# Patient Record
Sex: FEMALE | Race: WHITE | NOT HISPANIC OR LATINO | Employment: FULL TIME | URBAN - METROPOLITAN AREA
[De-identification: names, ages, dates, MRNs, and addresses within clinical notes are randomized per-mention and may not be internally consistent; named-entity substitution may affect disease eponyms.]

---

## 2017-02-21 ENCOUNTER — ALLSCRIPTS OFFICE VISIT (OUTPATIENT)
Dept: OTHER | Facility: OTHER | Age: 57
End: 2017-02-21

## 2017-02-21 DIAGNOSIS — M25.511 PAIN IN RIGHT SHOULDER: ICD-10-CM

## 2017-02-21 DIAGNOSIS — M79.89 OTHER SPECIFIED SOFT TISSUE DISORDERS: ICD-10-CM

## 2017-02-21 DIAGNOSIS — Z13.220 ENCOUNTER FOR SCREENING FOR LIPOID DISORDERS: ICD-10-CM

## 2017-02-21 DIAGNOSIS — Z12.39 ENCOUNTER FOR OTHER SCREENING FOR MALIGNANT NEOPLASM OF BREAST: ICD-10-CM

## 2017-03-06 ENCOUNTER — HOSPITAL ENCOUNTER (OUTPATIENT)
Dept: ULTRASOUND IMAGING | Facility: CLINIC | Age: 57
Discharge: HOME | End: 2017-03-06
Payer: COMMERCIAL

## 2017-03-06 ENCOUNTER — ALLSCRIPTS OFFICE VISIT (OUTPATIENT)
Dept: OTHER | Facility: OTHER | Age: 57
End: 2017-03-06

## 2017-03-06 DIAGNOSIS — M79.89 OTHER SPECIFIED SOFT TISSUE DISORDERS: ICD-10-CM

## 2017-03-06 PROCEDURE — 93971 EXTREMITY STUDY: CPT

## 2017-03-10 ENCOUNTER — ALLSCRIPTS OFFICE VISIT (OUTPATIENT)
Dept: OTHER | Facility: OTHER | Age: 57
End: 2017-03-10

## 2017-03-17 ENCOUNTER — GENERIC CONVERSION - ENCOUNTER (OUTPATIENT)
Dept: OTHER | Facility: OTHER | Age: 57
End: 2017-03-17

## 2017-03-28 ENCOUNTER — ALLSCRIPTS OFFICE VISIT (OUTPATIENT)
Dept: OTHER | Facility: OTHER | Age: 57
End: 2017-03-28

## 2017-05-02 ENCOUNTER — ALLSCRIPTS OFFICE VISIT (OUTPATIENT)
Dept: OTHER | Facility: OTHER | Age: 57
End: 2017-05-02

## 2017-06-07 ENCOUNTER — APPOINTMENT (OUTPATIENT)
Dept: LAB | Facility: CLINIC | Age: 57
End: 2017-06-07
Payer: COMMERCIAL

## 2017-06-07 ENCOUNTER — GENERIC CONVERSION - ENCOUNTER (OUTPATIENT)
Dept: OTHER | Facility: OTHER | Age: 57
End: 2017-06-07

## 2017-06-07 DIAGNOSIS — Z13.220 ENCOUNTER FOR SCREENING FOR LIPOID DISORDERS: ICD-10-CM

## 2017-06-07 LAB
ALBUMIN SERPL BCP-MCNC: 3.6 G/DL (ref 3.5–5)
ALP SERPL-CCNC: 73 U/L (ref 46–116)
ALT SERPL W P-5'-P-CCNC: 17 U/L (ref 12–78)
ANION GAP SERPL CALCULATED.3IONS-SCNC: 7 MMOL/L (ref 4–13)
AST SERPL W P-5'-P-CCNC: 14 U/L (ref 5–45)
BILIRUB SERPL-MCNC: 0.32 MG/DL (ref 0.2–1)
BILIRUB UR QL STRIP: NEGATIVE
BUN SERPL-MCNC: 18 MG/DL (ref 5–25)
CALCIUM SERPL-MCNC: 9 MG/DL (ref 8.3–10.1)
CHLORIDE SERPL-SCNC: 105 MMOL/L (ref 100–108)
CHOLEST SERPL-MCNC: 211 MG/DL (ref 50–200)
CLARITY UR: CLEAR
CO2 SERPL-SCNC: 27 MMOL/L (ref 21–32)
COLOR UR: YELLOW
CREAT SERPL-MCNC: 0.92 MG/DL (ref 0.6–1.3)
GFR SERPL CREATININE-BSD FRML MDRD: >60 ML/MIN/1.73SQ M
GLUCOSE P FAST SERPL-MCNC: 94 MG/DL (ref 65–99)
GLUCOSE UR STRIP-MCNC: NEGATIVE MG/DL
HDLC SERPL-MCNC: 74 MG/DL (ref 40–60)
HGB UR QL STRIP.AUTO: NEGATIVE
KETONES UR STRIP-MCNC: NEGATIVE MG/DL
LDLC SERPL CALC-MCNC: 122 MG/DL (ref 0–100)
LEUKOCYTE ESTERASE UR QL STRIP: NEGATIVE
NITRITE UR QL STRIP: NEGATIVE
PH UR STRIP.AUTO: 6 [PH] (ref 4.5–8)
POTASSIUM SERPL-SCNC: 4.3 MMOL/L (ref 3.5–5.3)
PROT SERPL-MCNC: 7.2 G/DL (ref 6.4–8.2)
PROT UR STRIP-MCNC: NEGATIVE MG/DL
SODIUM SERPL-SCNC: 139 MMOL/L (ref 136–145)
SP GR UR STRIP.AUTO: 1.02 (ref 1–1.03)
TRIGL SERPL-MCNC: 73 MG/DL
UROBILINOGEN UR QL STRIP.AUTO: 0.2 E.U./DL

## 2017-06-07 PROCEDURE — 80061 LIPID PANEL: CPT

## 2017-06-07 PROCEDURE — 80053 COMPREHEN METABOLIC PANEL: CPT

## 2017-06-07 PROCEDURE — 36415 COLL VENOUS BLD VENIPUNCTURE: CPT

## 2017-06-07 PROCEDURE — 81003 URINALYSIS AUTO W/O SCOPE: CPT

## 2017-08-25 ENCOUNTER — GENERIC CONVERSION - ENCOUNTER (OUTPATIENT)
Dept: OTHER | Facility: OTHER | Age: 57
End: 2017-08-25

## 2018-01-12 VITALS
RESPIRATION RATE: 12 BRPM | WEIGHT: 146 LBS | DIASTOLIC BLOOD PRESSURE: 70 MMHG | HEIGHT: 63 IN | BODY MASS INDEX: 25.87 KG/M2 | HEART RATE: 82 BPM | OXYGEN SATURATION: 99 % | SYSTOLIC BLOOD PRESSURE: 130 MMHG

## 2018-01-12 NOTE — MISCELLANEOUS
Message  Return to work or school:   Laila Neil is under my professional care  She was seen in my office on 3/10/2017   She is able to return to work on  03/20/2017       [facility]  Signatures   Electronically signed by :  MELECIO Durbin; Mar 17 2017  1:17PM EST                       (Author)

## 2018-01-13 VITALS
BODY MASS INDEX: 26.31 KG/M2 | HEART RATE: 73 BPM | RESPIRATION RATE: 12 BRPM | DIASTOLIC BLOOD PRESSURE: 76 MMHG | SYSTOLIC BLOOD PRESSURE: 120 MMHG | OXYGEN SATURATION: 97 % | WEIGHT: 148.5 LBS | TEMPERATURE: 97.8 F | HEIGHT: 63 IN

## 2018-01-13 VITALS
HEIGHT: 63 IN | HEART RATE: 73 BPM | OXYGEN SATURATION: 99 % | DIASTOLIC BLOOD PRESSURE: 72 MMHG | SYSTOLIC BLOOD PRESSURE: 122 MMHG | TEMPERATURE: 97.8 F | BODY MASS INDEX: 25.52 KG/M2 | RESPIRATION RATE: 12 BRPM | WEIGHT: 144.04 LBS

## 2018-01-13 VITALS
HEART RATE: 70 BPM | WEIGHT: 146 LBS | SYSTOLIC BLOOD PRESSURE: 130 MMHG | HEIGHT: 63 IN | DIASTOLIC BLOOD PRESSURE: 72 MMHG | BODY MASS INDEX: 25.87 KG/M2 | RESPIRATION RATE: 14 BRPM

## 2018-01-13 NOTE — MISCELLANEOUS
Message  Return to work or school:    She is able to return to work on  3/20/2017            Signatures   Electronically signed by :  Saniya [address], CRNP; Mar 15 2017  4:48PM EST                       (Author)

## 2018-01-14 VITALS
HEIGHT: 63 IN | TEMPERATURE: 98.4 F | HEART RATE: 94 BPM | WEIGHT: 149.5 LBS | RESPIRATION RATE: 14 BRPM | SYSTOLIC BLOOD PRESSURE: 100 MMHG | BODY MASS INDEX: 26.49 KG/M2 | OXYGEN SATURATION: 98 % | DIASTOLIC BLOOD PRESSURE: 80 MMHG

## 2018-01-15 NOTE — PROGRESS NOTES
Assessment    1  Thrombophlebitis leg (451 2) (I45 [id number])    Plan  Thrombophlebitis leg    · Follow-up PRN Evaluation and Treatment  Follow-up  Status: Complete  Done:  [id number]   · [facility]  Consult Only: the expectation is that the  referring provider will communicate back to the patient on treatment options  Evaluation  and Treatment: the expectation is that the referred to provider will communicate back  to the patient on treatment options  Status: Temporary Deferral - Pt requests deferral  Care Summary provided  : Yes    Discussion/Summary    I believe the inflammation in the vein is resolving  Continue the aspirin, elevate the leg and use compression stockings  If not better by next week, call here and i will refer to a vascular surgeon for his opinion  Patient is able to Self-Care  The treatment plan was reviewed with the patient/guardian  The patient/guardian understands and agrees with the treatment plan   The was counseled regarding diagnostic results, instructions for management, risk factor reductions, prognosis, patient and family education, impressions, risks and benefits of treatment options, importance of compliance with treatment  Self Referrals: No      Chief Complaint  patient is here for her Leg again she says that the infection is traveling up her leg and in one spot it feels hot to touch      History of Present Illness  Pt is here for f/u on superficial thrombophlebitis  Taking her keflex (r/o cellulitis) and on her aspirin and warm soaks  Elevating the leg  Still touching the area quite a bit per   Thinks she feels an area higher which is warmer now  Swelling in the left lower leg has resolved  States there was ecchymoses there which also has resolved  Review of Systems    Constitutional: no fever and no chills  Eyes: No complaints of eye pain, no red eyes, no eyesight problems, no discharge, no dry eyes, no itching of eyes     ENT: no complaints of earache, no loss of hearing, no nose bleeds, no nasal discharge, no sore throat, no hoarseness  Cardiovascular: no chest pain and no palpitations  Respiratory: no shortness of breath and no cough  Musculoskeletal: limb pain and limb swelling, but no arthralgias and no myalgias  Integumentary: no rashes and no skin lesions  Neurological: no headache and no difficulty walking  ROS reviewed  Active Problems    1  Anxiety (300 00) (F41 9)   2  Bipolar depression (296 50) (F31 30)   3  Depression (311) (F32 9)   4  Migraine (346 90) (G43 909)   5  Need for influenza vaccination (V04 81) (Z23)   6  Right shoulder pain (719 41) (M25 511)   7  Screening breast examination (V76 10) (Z12 39)   8  Screening for colorectal cancer (V76 51) (Z12 11,Z12 12)   9  Screening, lipid (V77 91) (Z13 220)   10  Swelling of left lower extremity (729 81) (M79 89)    Past Medical History    The active problems and past medical history were reviewed and updated today  Surgical History    The surgical history was reviewed and updated today  Family History  Mother    1  Denied: Family history of cardiac disorder   2  Denied: Family history of mental disorder   3  Family history of myocardial infarction (V17 3) (Z82 49)   4  Denied: Family history of Illicit drug use  Father    5  Denied: Family history of cardiac disorder   6  Denied: Family history of mental disorder   7  Family history of myocardial infarction (V17 3) (Z82 49)   8  Denied: Family history of Illicit drug use  Sister    5  Family history of migraine headaches (V17 2) (Z82 0)  Brother    10  Family history of myocardial infarction (V17 3) (Z82 49)    The family history was reviewed and updated today         Social History    · Always uses seat belt   · Consumes alcohol at social events (V49 89) (Z78 9)   · Employed   · Former smoker (V15 82) (G41 635)   · Lives with spouse   ·    · No caffeine use   · Social alcohol use (Z78 9)  The social history was reviewed and updated today  Current Meds   1  ARIPiprazole 2 MG Oral Tablet; take 1 tablet by mouth once daily  Requested for:   21Feb2017; Last Rx:21Feb2017 Ordered   2  BusPIRone HCl - 15 MG Oral Tablet; take 1 by mouth three times a day  Requested for:   21Feb2017; Last Rx:21Feb2017 Ordered   3  Cephalexin 500 MG Oral Capsule; TAKE 1 CAPSULE 3 TIMES DAILY UNTIL GONE;   Therapy: 27TWU1319 to (Evaluate:16Mar2017)  Requested for: 54BQL2430; Last   Rx:06Mar2017 Ordered   4  LORazepam 1 MG Oral Tablet; Take 1/2 to 1 daily as needed while on your cruise; Therapy: 21Feb2017 to (Last Rx:21Feb2017) Ordered   5  SUMAtriptan Succinate 50 MG Oral Tablet; Take 1 tablet by mouth during onset of   migraine; Therapy: (Recorded:18Oct2016) to Recorded   6  Venlafaxine HCl ER 75 MG Oral Capsule Extended Release 24 Hour; TAKE 1 CAPSULE   ONCE DAILY WITH FOOD; Therapy: 06CXK9819 to (Evaluate:56Iwg8652)  Requested for: 21Feb2017; Last   Rx:21Feb2017 Ordered    The medication list was reviewed and updated today  Allergies    1  No Known Drug Allergies    Vitals  Vital Signs    Recorded: 18XAF9784 08:24AM   Temperature 97 8 F   Heart Rate 73   Respiration 12   Systolic 031   Diastolic 76   Height 5 ft 3 in   Weight 148 lb 8 0 oz   BMI Calculated 26 31   BSA Calculated 1 71   O2 Saturation 97     Physical Exam    Constitutional   General appearance: No acute distress, well appearing and well nourished  Eyes   Conjunctiva and lids: No swelling, erythema or discharge  Pupils and irises: Equal, round and reactive to light  Pulmonary   Respiratory effort: No increased work of breathing or signs of respiratory distress  Auscultation of lungs: Clear to auscultation  Cardiovascular   Auscultation of heart: Normal rate and rhythm, normal S1 and S2, without murmurs      Examination of extremities for edema and/or varicosities: Abnormal   Cords palpated left leg above popliteal area and left medial calf area  Musculoskeletal   Gait and station: Normal     Inspection/palpation of joints, bones, and muscles: Normal     Skin   Skin and subcutaneous tissue: Abnormal   Still with minimal redness to cords palpated but improved with no heat noted  Psychiatric   Orientation to person, place, and time: Normal     Mood and affect: Normal          Results/Data  VAS LOWER LIMB VENOUS DUPLEX STUDY, UNILATERAL/LIMITED [id number] 10:51AM Pamela Scheuermann Order Number: [id number]   Performing Comments: STAT AND call with instruction    - Patient Instructions: To schedule this appointment, please contact Central Scheduling at [phone]  Test Name Result Flag Reference   VAS LOWER LIMB VENOUS DUPLEX STUDY, UNILATERAL/LIMITED (Report)     [facility] REPORT   CLINICAL:   Indications: Other specified soft tissue disorders [M79 89]  Lump behind left knee and swollen - started 10 days ago   No Hx of DVT   No trauma   Risk Factors: The patient has no history of DVT or limb trauma  Clinical:      FINDINGS:      Segment    Right      Left               Impression    Impression        CFV      Normal (Patent) Normal (Patent)      GSV Knee            Thrombosed (acute)    GSV Mid Calf          Thrombosed (acute)    GSV Ankle           Thrombosed (acute)    Popliteal           Normal (Patent)               CONCLUSION:   Impression:   RIGHT:   Evaluation shows no evidence of thrombus in the common femoral vein  Doppler evaluation shows a normal response to augmentation maneuvers  LEFT:   No evidence of acute or chronic deep vein thrombosis   Evidence of superficial thrombophlebitis noted from proximal calf to distal   calf  Doppler evaluation shows a normal response to augmentation maneuvers  Popliteal, posterior tibial and anterior tibial arterial Doppler waveforms are   biphasic  Tech Note: Preliminary report given to Franklin Guillory        SIGNATURE:   Electronically Signed by: Laura Jane MD on 2017-03-06 03:59:51 PM     Attending Note  Collaborating Note: I supervised the Advanced Practitioner and I agree with the Advanced Practitioner note  Future Appointments    Date/Time Provider Specialty Site   03/14/2017 05:30 PM Bola Fitzpatrick, [facility]   06/06/2017 05:15 PM Bola Fitzpatrick, [address] [facility]     Signatures   Electronically signed by :  MELECIO Martinez; Mar 10 2017  9:18AM EST                       (Author)    Electronically signed by : Lenin Muñoz DO; Mar 10 2017  1:28PM EST                       (Co-author)

## 2018-01-16 NOTE — RESULT NOTES
Verified Results  (1) COMPREHENSIVE METABOLIC PANEL 08NXN3506 41:28VM Magnus Pitt Order Number: [id number]     Test Name Result Flag Reference   SODIUM 139 mmol/L  136-145   POTASSIUM 4 3 mmol/L  3 5-5 3   CHLORIDE 105 mmol/L  100-108   CARBON DIOXIDE 27 mmol/L  21-32   ANION GAP (CALC) 7 mmol/L  4-13   BLOOD UREA NITROGEN 18 mg/dL  5-25   CREATININE 0 92 mg/dL  0 60-1 30   Standardized to IDMS reference method   CALCIUM 9 0 mg/dL  8 3-10 1   BILI, TOTAL 0 32 mg/dL  0 20-1 00   ALK PHOSPHATAS 73 U/L     ALT (SGPT) 17 U/L  12-78   AST(SGOT) 14 U/L  5-45   ALBUMIN 3 6 g/dL  3 5-5 0   TOTAL PROTEIN 7 2 g/dL  6 4-8 2   eGFR Non-African American      >60 0 ml/min/1 73sq m   [facility] Disease Education Program recommendations are as follows:  GFR calculation is accurate only with a steady state creatinine  Chronic Kidney disease less than 60 ml/min/1 73 sq  meters  Kidney failure less than 15 ml/min/1 73 sq  meters     GLUCOSE FASTING 94 mg/dL  65-99

## 2018-03-08 DIAGNOSIS — F31.9 BIPOLAR DEPRESSION (HCC): Primary | ICD-10-CM

## 2018-03-08 RX ORDER — VENLAFAXINE HYDROCHLORIDE 75 MG/1
75 CAPSULE, EXTENDED RELEASE ORAL DAILY
Qty: 90 CAPSULE | Refills: 1 | Status: SHIPPED | OUTPATIENT
Start: 2018-03-08 | End: 2018-08-28 | Stop reason: SDUPTHER

## 2018-03-08 RX ORDER — ARIPIPRAZOLE 2 MG/1
2 TABLET ORAL DAILY
Qty: 90 TABLET | Refills: 1 | Status: SHIPPED | OUTPATIENT
Start: 2018-03-08 | End: 2018-08-28 | Stop reason: SDUPTHER

## 2018-03-08 RX ORDER — VENLAFAXINE HYDROCHLORIDE 75 MG/1
1 CAPSULE, EXTENDED RELEASE ORAL DAILY
COMMUNITY
Start: 2016-11-14 | End: 2018-03-08 | Stop reason: SDUPTHER

## 2018-03-08 RX ORDER — ARIPIPRAZOLE 2 MG/1
1 TABLET ORAL DAILY
COMMUNITY
End: 2018-03-08 | Stop reason: SDUPTHER

## 2018-03-09 ENCOUNTER — TELEPHONE (OUTPATIENT)
Dept: FAMILY MEDICINE CLINIC | Facility: CLINIC | Age: 58
End: 2018-03-09

## 2018-03-09 DIAGNOSIS — M54.50 ACUTE LOW BACK PAIN WITHOUT SCIATICA, UNSPECIFIED BACK PAIN LATERALITY: Primary | ICD-10-CM

## 2018-03-09 RX ORDER — CYCLOBENZAPRINE HCL 10 MG
10 TABLET ORAL
Qty: 10 TABLET | Refills: 0 | Status: SHIPPED | OUTPATIENT
Start: 2018-03-09 | End: 2018-12-18

## 2018-03-09 NOTE — TELEPHONE ENCOUNTER
Sachi Barnett has not been seen since 6-2017  I will call in 10 muscle relaxers but she needs a f/u appt

## 2018-03-09 NOTE — TELEPHONE ENCOUNTER
Patient c/o she pulled her back out and is asking if you can call something into the pharmacy for her

## 2018-03-14 RX ORDER — HYDROXYZINE HYDROCHLORIDE 25 MG/1
TABLET, FILM COATED ORAL
COMMUNITY
Start: 2017-05-05 | End: 2019-03-04 | Stop reason: ALTCHOICE

## 2018-03-14 RX ORDER — MOMETASONE FUROATE 1 MG/G
CREAM TOPICAL DAILY
COMMUNITY
Start: 2017-05-02 | End: 2019-05-31 | Stop reason: SDUPTHER

## 2018-03-14 RX ORDER — SUMATRIPTAN 50 MG/1
TABLET, FILM COATED ORAL
COMMUNITY
End: 2021-11-23 | Stop reason: ALTCHOICE

## 2018-03-14 RX ORDER — LORAZEPAM 1 MG/1
TABLET ORAL
COMMUNITY
Start: 2017-02-21 | End: 2018-09-14 | Stop reason: ALTCHOICE

## 2018-03-14 RX ORDER — BUSPIRONE HYDROCHLORIDE 15 MG/1
TABLET ORAL
COMMUNITY
End: 2018-07-06 | Stop reason: SDUPTHER

## 2018-03-14 RX ORDER — ASPIRIN 325 MG
TABLET ORAL
COMMUNITY
End: 2019-05-31 | Stop reason: CLARIF

## 2018-07-06 DIAGNOSIS — F41.9 ANXIETY: Primary | ICD-10-CM

## 2018-07-06 RX ORDER — BUSPIRONE HYDROCHLORIDE 15 MG/1
TABLET ORAL
Qty: 90 TABLET | Refills: 0 | Status: SHIPPED | OUTPATIENT
Start: 2018-07-06 | End: 2018-08-02 | Stop reason: SDUPTHER

## 2018-08-02 DIAGNOSIS — F41.9 ANXIETY: ICD-10-CM

## 2018-08-02 RX ORDER — BUSPIRONE HYDROCHLORIDE 15 MG/1
TABLET ORAL
Qty: 90 TABLET | Refills: 0 | Status: SHIPPED | OUTPATIENT
Start: 2018-08-02 | End: 2018-08-29 | Stop reason: SDUPTHER

## 2018-08-28 DIAGNOSIS — F31.9 BIPOLAR DEPRESSION (HCC): ICD-10-CM

## 2018-08-28 RX ORDER — VENLAFAXINE HYDROCHLORIDE 75 MG/1
75 CAPSULE, EXTENDED RELEASE ORAL DAILY
Qty: 30 CAPSULE | Refills: 0 | Status: SHIPPED | OUTPATIENT
Start: 2018-08-28 | End: 2018-09-24 | Stop reason: SDUPTHER

## 2018-08-28 RX ORDER — ARIPIPRAZOLE 2 MG/1
2 TABLET ORAL DAILY
Qty: 30 TABLET | Refills: 0 | Status: SHIPPED | OUTPATIENT
Start: 2018-08-28 | End: 2018-09-24 | Stop reason: SDUPTHER

## 2018-08-28 NOTE — TELEPHONE ENCOUNTER
I am ordering 30 only of each med  She has not been seen for over a year  Needs appt for further refills

## 2018-08-29 DIAGNOSIS — F41.9 ANXIETY: ICD-10-CM

## 2018-08-29 RX ORDER — BUSPIRONE HYDROCHLORIDE 15 MG/1
TABLET ORAL
Qty: 90 TABLET | Refills: 0 | Status: SHIPPED | OUTPATIENT
Start: 2018-08-29 | End: 2019-03-04 | Stop reason: SDUPTHER

## 2018-09-14 DIAGNOSIS — F06.4 ANXIETY DISORDER DUE TO MEDICAL CONDITION: Primary | ICD-10-CM

## 2018-09-14 RX ORDER — ALPRAZOLAM 0.5 MG/1
0.5 TABLET ORAL 2 TIMES DAILY PRN
Qty: 30 TABLET | Refills: 0 | Status: SHIPPED | OUTPATIENT
Start: 2018-09-14 | End: 2018-10-29 | Stop reason: SDUPTHER

## 2018-09-24 DIAGNOSIS — F31.9 BIPOLAR DEPRESSION (HCC): ICD-10-CM

## 2018-09-24 RX ORDER — VENLAFAXINE HYDROCHLORIDE 75 MG/1
CAPSULE, EXTENDED RELEASE ORAL
Qty: 30 CAPSULE | Refills: 0 | Status: SHIPPED | OUTPATIENT
Start: 2018-09-24 | End: 2018-10-29 | Stop reason: SDUPTHER

## 2018-09-24 RX ORDER — ARIPIPRAZOLE 2 MG/1
TABLET ORAL
Qty: 30 TABLET | Refills: 0 | Status: SHIPPED | OUTPATIENT
Start: 2018-09-24 | End: 2018-10-29 | Stop reason: SDUPTHER

## 2018-10-23 ENCOUNTER — TELEPHONE (OUTPATIENT)
Dept: FAMILY MEDICINE CLINIC | Facility: CLINIC | Age: 58
End: 2018-10-23

## 2018-10-23 NOTE — TELEPHONE ENCOUNTER
Yes, please write the letter, but I really need Yesi to come in for a f/u  Very overdue  I get what is going on

## 2018-10-23 NOTE — TELEPHONE ENCOUNTER
Patient called stating that she would like a work excuse to cover her bc she may need to take day off intermittently do to her  being terminally ill until further notice    Patient would like note faxed to [phone] ATTN: Bryant Goff    Please okay me writing the letter

## 2018-10-29 DIAGNOSIS — F06.4 ANXIETY DISORDER DUE TO MEDICAL CONDITION: ICD-10-CM

## 2018-10-29 DIAGNOSIS — F31.9 BIPOLAR DEPRESSION (HCC): ICD-10-CM

## 2018-10-29 RX ORDER — VENLAFAXINE HYDROCHLORIDE 75 MG/1
75 CAPSULE, EXTENDED RELEASE ORAL DAILY
Qty: 30 CAPSULE | Refills: 0 | Status: SHIPPED | OUTPATIENT
Start: 2018-10-29 | End: 2019-01-02 | Stop reason: SDUPTHER

## 2018-10-29 RX ORDER — ALPRAZOLAM 0.5 MG/1
0.5 TABLET ORAL 2 TIMES DAILY PRN
Qty: 30 TABLET | Refills: 0 | Status: SHIPPED | OUTPATIENT
Start: 2018-10-29 | End: 2018-12-18

## 2018-10-29 RX ORDER — ARIPIPRAZOLE 2 MG/1
2 TABLET ORAL DAILY
Qty: 30 TABLET | Refills: 0 | Status: SHIPPED | OUTPATIENT
Start: 2018-10-29 | End: 2019-02-27 | Stop reason: SDUPTHER

## 2018-10-29 NOTE — TELEPHONE ENCOUNTER
Please have her come in  I know what is going on but I have not seen her forever  I will order 30 days

## 2018-11-08 ENCOUNTER — OFFICE VISIT (OUTPATIENT)
Dept: FAMILY MEDICINE CLINIC | Facility: CLINIC | Age: 58
End: 2018-11-08
Payer: COMMERCIAL

## 2018-11-08 VITALS
HEIGHT: 63 IN | DIASTOLIC BLOOD PRESSURE: 82 MMHG | BODY MASS INDEX: 26.29 KG/M2 | OXYGEN SATURATION: 98 % | HEART RATE: 63 BPM | WEIGHT: 148.4 LBS | SYSTOLIC BLOOD PRESSURE: 126 MMHG

## 2018-11-08 DIAGNOSIS — Z13.220 SCREENING, LIPID: ICD-10-CM

## 2018-11-08 DIAGNOSIS — F41.8 DEPRESSION WITH ANXIETY: Primary | ICD-10-CM

## 2018-11-08 DIAGNOSIS — F43.21 GRIEVING: ICD-10-CM

## 2018-11-08 PROCEDURE — 3008F BODY MASS INDEX DOCD: CPT | Performed by: FAMILY MEDICINE

## 2018-11-08 PROCEDURE — 99213 OFFICE O/P EST LOW 20 MIN: CPT | Performed by: FAMILY MEDICINE

## 2018-11-08 NOTE — PATIENT INSTRUCTIONS
I know you lost a good man  I am very happy that you are going for counseling  Continue on the current meds  Call if you need anything  Schedule a pap   Have the labs done fasting prior to the next appt ;

## 2018-11-08 NOTE — PROGRESS NOTES
Assessment/Plan:     Chronic Problems:  Depression with anxiety  Will continue current meds  Call for problems  Visit Diagnosis:  Diagnoses and all orders for this visit:    Depression with anxiety  -     Comprehensive metabolic panel; Future  -     Microalbumin / creatinine urine ratio  -     Urinalysis with microscopic  -     TSH, 3rd generation with Free T4 reflex; Future    Grieving  Comments:  Keep the appt with your counselor  Screening, lipid  -     Lipid panel; Future          Subjective:    Patient ID: Pablo Pugh is a 66 y o  female  Pt is here s/p losing her  to sarcoma recently  Family is still there with her but planning on leaving   suffered a lot at the end  Feels she can't move for 2 years  Has a lot of friends  Has an appt with counselor in [facility]  Feels she has the shakes a lot  Still on buspar 1 1/2 pills daily  Not sleeping at night  Does not want to take anything for sleep  Thinks her meds are holding her  Takes all other meds as directed  No side effects noted  The following portions of the patient's history were reviewed and updated as appropriate: allergies, current medications, past family history, past medical history, past social history, past surgical history and problem list     Review of Systems   Constitutional: Negative for chills and fever  HENT: Negative for ear pain, postnasal drip and sore throat  Eyes: Negative for pain and visual disturbance  Respiratory: Negative for cough, chest tightness, shortness of breath and wheezing  Cardiovascular: Negative for chest pain and palpitations  Endocrine: Negative for cold intolerance, heat intolerance and polyuria  Genitourinary: Negative for dysuria, frequency and urgency  Musculoskeletal: Negative for arthralgias, gait problem and myalgias  Skin: Negative for pallor and rash  Neurological: Negative for dizziness, light-headedness and numbness     Psychiatric/Behavioral: Positive for dysphoric mood  Negative for suicidal ideas  The patient is nervous/anxious  Feels very sad about losing her   /82   Pulse 63   Ht 5' 3" (1 6 m)   Wt 67 3 kg (148 lb 6 4 oz)   SpO2 98%   BMI 26 29 kg/m²   Social History     Social History    Marital status: /Civil Union     Spouse name: N/A    Number of children: N/A    Years of education: N/A     Occupational History    employed      Social History Main Topics    Smoking status: Former Smoker    Smokeless tobacco: Never Used    Alcohol use Yes      Comment: social    Drug use: No    Sexual activity: Not on file     Other Topics Concern    Not on file     Social History Narrative    Always uses seatbelt    Lives with spouse    No caffeine use         No past medical history on file  Family History   Problem Relation Age of Onset    Heart attack Mother         MI    Heart attack Father         MI    Migraines Sister     Heart attack Brother         MI     No past surgical history on file      Current Outpatient Prescriptions:     ALPRAZolam (XANAX) 0 5 mg tablet, Take 1 tablet (0 5 mg total) by mouth 2 (two) times a day as needed for anxiety, Disp: 30 tablet, Rfl: 0    ARIPiprazole (ABILIFY) 2 mg tablet, Take 1 tablet (2 mg total) by mouth daily, Disp: 30 tablet, Rfl: 0    busPIRone (BUSPAR) 15 mg tablet, TAKE 1 BY MOUTH THREE TIMES A DAY, Disp: 90 tablet, Rfl: 0    SUMAtriptan (IMITREX) 50 mg tablet, Take by mouth, Disp: , Rfl:     venlafaxine (EFFEXOR-XR) 75 mg 24 hr capsule, Take 1 capsule (75 mg total) by mouth daily, Disp: 30 capsule, Rfl: 0    aspirin 325 mg tablet, Take by mouth, Disp: , Rfl:     cyclobenzaprine (FLEXERIL) 10 mg tablet, Take 1 tablet (10 mg total) by mouth daily at bedtime (Patient not taking: Reported on 11/8/2018 ), Disp: 10 tablet, Rfl: 0    hydrOXYzine HCL (ATARAX) 25 mg tablet, Take by mouth, Disp: , Rfl:     mometasone (ELOCON) 0 1 % cream, Apply topically daily, Disp: , Rfl: No Known Allergies       Lab Review   No visits with results within 6 Month(s) from this visit  Latest known visit with results is:   Appointment on 06/07/2017   Component Date Value    Sodium 06/07/2017 139     Potassium 06/07/2017 4 3     Chloride 06/07/2017 105     CO2 06/07/2017 27     ANION GAP 06/07/2017 7     BUN 06/07/2017 18     Creatinine 06/07/2017 0 92     Glucose, Fasting 06/07/2017 94     Calcium 06/07/2017 9 0     AST 06/07/2017 14     ALT 06/07/2017 17     Alkaline Phosphatase 06/07/2017 73     Total Protein 06/07/2017 7 2     Albumin 06/07/2017 3 6     Total Bilirubin 06/07/2017 0 32     eGFR 06/07/2017 >60 0     Cholesterol 06/07/2017 211*    Triglycerides 06/07/2017 73     HDL, Direct 06/07/2017 74*    LDL Calculated 06/07/2017 122*    Color, UA 06/07/2017 Yellow     Clarity, UA 06/07/2017 Clear     Specific Gravity, UA 06/07/2017 1 018     pH, UA 06/07/2017 6 0     Leukocytes, UA 06/07/2017 Negative     Nitrite, UA 06/07/2017 Negative     Protein, UA 06/07/2017 Negative     Glucose, UA 06/07/2017 Negative     Ketones, UA 06/07/2017 Negative     Urobilinogen, UA 06/07/2017 0 2     Bilirubin, UA 06/07/2017 Negative     Blood, UA 06/07/2017 Negative         Imaging: No results found  Objective:     Physical Exam   Constitutional: She is oriented to person, place, and time  She appears well-developed and well-nourished  No distress  HENT:   Head: Normocephalic and atraumatic  Right Ear: External ear normal    Left Ear: External ear normal    Mouth/Throat: Oropharynx is clear and moist    Eyes: Pupils are equal, round, and reactive to light  Conjunctivae and EOM are normal  Right eye exhibits no discharge  Left eye exhibits no discharge  Neck: Normal range of motion  Neck supple  Cardiovascular: Normal rate, regular rhythm and normal heart sounds  Exam reveals no friction rub  No murmur heard    Pulmonary/Chest: Effort normal and breath sounds normal  No respiratory distress  She has no wheezes  She has no rales  She exhibits no tenderness  Musculoskeletal: Normal range of motion  She exhibits no edema, tenderness or deformity  Lymphadenopathy:     She has no cervical adenopathy  Neurological: She is alert and oriented to person, place, and time  No cranial nerve deficit  Skin: Skin is warm and dry  No rash noted  She is not diaphoretic  Psychiatric: She has a normal mood and affect  Her behavior is normal  Judgment and thought content normal          Patient Instructions   I know you lost a good man  I am very happy that you are going for counseling  Continue on the current meds  Call if you need anything  Schedule a pap   Have the labs done fasting prior to the next appt ;       MELECIO Garcia

## 2018-12-05 DIAGNOSIS — F06.4 ANXIETY DISORDER DUE TO MEDICAL CONDITION: ICD-10-CM

## 2018-12-05 RX ORDER — ALPRAZOLAM 0.5 MG/1
0.5 TABLET ORAL 2 TIMES DAILY PRN
Qty: 30 TABLET | Refills: 0 | Status: SHIPPED | OUTPATIENT
Start: 2018-12-05 | End: 2019-03-02 | Stop reason: SDUPTHER

## 2018-12-05 NOTE — TELEPHONE ENCOUNTER
Spoke with patient and she said she does need this med  She still is mad that ed is gone and very hard for her

## 2018-12-08 ENCOUNTER — APPOINTMENT (OUTPATIENT)
Dept: LAB | Facility: CLINIC | Age: 58
End: 2018-12-08
Payer: COMMERCIAL

## 2018-12-08 DIAGNOSIS — F41.8 DEPRESSION WITH ANXIETY: ICD-10-CM

## 2018-12-08 DIAGNOSIS — Z13.220 SCREENING, LIPID: ICD-10-CM

## 2018-12-08 LAB
ALBUMIN SERPL BCP-MCNC: 3.5 G/DL (ref 3.5–5)
ALP SERPL-CCNC: 67 U/L (ref 46–116)
ALT SERPL W P-5'-P-CCNC: 26 U/L (ref 12–78)
ANION GAP SERPL CALCULATED.3IONS-SCNC: 5 MMOL/L (ref 4–13)
AST SERPL W P-5'-P-CCNC: 20 U/L (ref 5–45)
BACTERIA UR QL AUTO: ABNORMAL /HPF
BILIRUB SERPL-MCNC: 0.31 MG/DL (ref 0.2–1)
BILIRUB UR QL STRIP: NEGATIVE
BUN SERPL-MCNC: 18 MG/DL (ref 5–25)
CALCIUM SERPL-MCNC: 9.4 MG/DL (ref 8.3–10.1)
CHLORIDE SERPL-SCNC: 104 MMOL/L (ref 100–108)
CHOLEST SERPL-MCNC: 219 MG/DL (ref 50–200)
CLARITY UR: ABNORMAL
CO2 SERPL-SCNC: 29 MMOL/L (ref 21–32)
COLOR UR: YELLOW
CREAT SERPL-MCNC: 0.98 MG/DL (ref 0.6–1.3)
CREAT UR-MCNC: 149 MG/DL
GFR SERPL CREATININE-BSD FRML MDRD: 65 ML/MIN/1.73SQ M
GLUCOSE P FAST SERPL-MCNC: 86 MG/DL (ref 65–99)
GLUCOSE UR STRIP-MCNC: NEGATIVE MG/DL
HDLC SERPL-MCNC: 75 MG/DL (ref 40–60)
HGB UR QL STRIP.AUTO: NEGATIVE
HYALINE CASTS #/AREA URNS LPF: ABNORMAL /LPF
KETONES UR STRIP-MCNC: NEGATIVE MG/DL
LDLC SERPL CALC-MCNC: 121 MG/DL (ref 0–100)
LEUKOCYTE ESTERASE UR QL STRIP: NEGATIVE
MICROALBUMIN UR-MCNC: 6.2 MG/L (ref 0–20)
MICROALBUMIN/CREAT 24H UR: 4 MG/G CREATININE (ref 0–30)
NITRITE UR QL STRIP: NEGATIVE
NON-SQ EPI CELLS URNS QL MICRO: ABNORMAL /HPF
NONHDLC SERPL-MCNC: 144 MG/DL
PH UR STRIP.AUTO: 5.5 [PH] (ref 4.5–8)
POTASSIUM SERPL-SCNC: 4.1 MMOL/L (ref 3.5–5.3)
PROT SERPL-MCNC: 7.4 G/DL (ref 6.4–8.2)
PROT UR STRIP-MCNC: NEGATIVE MG/DL
RBC #/AREA URNS AUTO: ABNORMAL /HPF
SODIUM SERPL-SCNC: 138 MMOL/L (ref 136–145)
SP GR UR STRIP.AUTO: 1.02 (ref 1–1.03)
TRIGL SERPL-MCNC: 117 MG/DL
TSH SERPL DL<=0.05 MIU/L-ACNC: 1.9 UIU/ML (ref 0.36–3.74)
UROBILINOGEN UR QL STRIP.AUTO: 0.2 E.U./DL
WBC #/AREA URNS AUTO: ABNORMAL /HPF

## 2018-12-08 PROCEDURE — 82043 UR ALBUMIN QUANTITATIVE: CPT | Performed by: FAMILY MEDICINE

## 2018-12-08 PROCEDURE — 80053 COMPREHEN METABOLIC PANEL: CPT

## 2018-12-08 PROCEDURE — 82570 ASSAY OF URINE CREATININE: CPT | Performed by: FAMILY MEDICINE

## 2018-12-08 PROCEDURE — 36415 COLL VENOUS BLD VENIPUNCTURE: CPT

## 2018-12-08 PROCEDURE — 84443 ASSAY THYROID STIM HORMONE: CPT

## 2018-12-08 PROCEDURE — 80061 LIPID PANEL: CPT

## 2018-12-08 PROCEDURE — 81001 URINALYSIS AUTO W/SCOPE: CPT | Performed by: FAMILY MEDICINE

## 2018-12-18 ENCOUNTER — ANNUAL EXAM (OUTPATIENT)
Dept: FAMILY MEDICINE CLINIC | Facility: CLINIC | Age: 58
End: 2018-12-18
Payer: COMMERCIAL

## 2018-12-18 VITALS
OXYGEN SATURATION: 98 % | SYSTOLIC BLOOD PRESSURE: 150 MMHG | TEMPERATURE: 98.6 F | RESPIRATION RATE: 12 BRPM | DIASTOLIC BLOOD PRESSURE: 80 MMHG | HEIGHT: 63 IN | WEIGHT: 147.2 LBS | BODY MASS INDEX: 26.08 KG/M2 | HEART RATE: 88 BPM

## 2018-12-18 DIAGNOSIS — N91.2 AMENORRHEA: ICD-10-CM

## 2018-12-18 DIAGNOSIS — F43.21 GRIEVING: ICD-10-CM

## 2018-12-18 DIAGNOSIS — Z12.11 COLON CANCER SCREENING: ICD-10-CM

## 2018-12-18 DIAGNOSIS — Z01.419 ENCOUNTER FOR GYNECOLOGICAL EXAMINATION WITHOUT ABNORMAL FINDING: Primary | ICD-10-CM

## 2018-12-18 PROCEDURE — 99214 OFFICE O/P EST MOD 30 MIN: CPT | Performed by: FAMILY MEDICINE

## 2018-12-18 PROCEDURE — G0145 SCR C/V CYTO,THINLAYER,RESCR: HCPCS | Performed by: FAMILY MEDICINE

## 2018-12-18 PROCEDURE — 87624 HPV HI-RISK TYP POOLED RSLT: CPT | Performed by: FAMILY MEDICINE

## 2018-12-18 NOTE — PATIENT INSTRUCTIONS
No reviewed with patient and normal   Will call with results of Pap smear  If this is normal we can repeat a Pap in 3-5 years  Have the stool specimen done  Have the additional labs done  I strongly suggest you consider getting a dog  Follow up here in about 4-6 weeks  Now is not the time to stop any of your antidepressants

## 2018-12-18 NOTE — PROGRESS NOTES
Assessment/Plan:     Chronic Problems:  No problem-specific Assessment & Plan notes found for this encounter  Visit Diagnosis:  Diagnoses and all orders for this visit:    Encounter for gynecological examination without abnormal finding  -     Liquid-based pap, screening    Grieving    Amenorrhea  -     FSH and LH; Future    Colon cancer screening  -     Occult Blood, Fecal Immunochemical; Future          Subjective      Yesi Daniels is a 66 y o  female who presents for annual exam  Still getting menses  Last menses was 3 to 4 months ago  The patient reports that there is not domestic violence in her life  Current contraception: none  History of abnormal Pap smear: no  Family history of uterine or ovarian cancer: no  Regular self breast exam: no  History of abnormal mammogram: no  Family history of breast cancer: yes - Maternal aunts possibly  History of abnormal lipids: no  Previous gyn surgeries:  no  History of previous sti's:  no  Age at menarche: 17  Age at menopause: still menstruating  OB/GYN history: T-  P-  A-  L-    0  Vaginal deliveries - 0  C-Sections - 0  Last pap - about 3 years ago  Last mammo - recently  Last colonoscopy - 2 years ago and reported as wnl  HPI  Pt is here for routine pap  Just lost her   Very sad  Misses him terribly  No female complaints  Takes all other meds as directed  No side effects noted  The following portions of the patient's history were reviewed and updated as appropriate: allergies, current medications, past family history, past medical history, past social history, past surgical history and problem list       Review of Systems  Review of Systems   Constitutional: Negative for chills and fever  HENT: Negative for ear pain, postnasal drip and sore throat  Eyes: Negative for pain and visual disturbance  Respiratory: Negative for cough, chest tightness and shortness of breath      Cardiovascular: Negative for chest pain and leg swelling  Gastrointestinal: Negative for abdominal pain, constipation, diarrhea, nausea and vomiting  Endocrine: Negative for cold intolerance, heat intolerance and polyuria  Genitourinary: Negative for dysuria, frequency and urgency  Musculoskeletal: Positive for back pain  Negative for arthralgias, gait problem and myalgias  Skin: Negative for pallor and rash  Neurological: Negative for dizziness, light-headedness and numbness  Psychiatric/Behavioral: Positive for sleep disturbance (used to waking up for her  who passed  )  Negative for dysphoric mood and suicidal ideas  The patient is not nervous/anxious  Feels sad over the loss of her   Feels depressed when she goes home  /80   Pulse 88   Temp 98 6 °F (37 °C)   Resp 12   Ht 5' 3" (1 6 m)   Wt 66 8 kg (147 lb 3 2 oz)   SpO2 98%   BMI 26 08 kg/m²   Social History     Social History    Marital status: /Civil Union     Spouse name: N/A    Number of children: N/A    Years of education: N/A     Occupational History    employed      Social History Main Topics    Smoking status: Former Smoker    Smokeless tobacco: Never Used    Alcohol use Yes      Comment: social    Drug use: No    Sexual activity: Not on file     Other Topics Concern    Not on file     Social History Narrative    Always uses seatbelt    Lives with spouse    No caffeine use         No past medical history on file  Family History   Problem Relation Age of Onset    Heart attack Mother         MI    Heart attack Father         MI    Migraines Sister     Heart attack Brother         MI     No past surgical history on file      Current Outpatient Prescriptions:     ALPRAZolam (XANAX) 0 5 mg tablet, TAKE 1 TABLET (0 5 MG TOTAL) BY MOUTH 2 (TWO) TIMES A DAY AS NEEDED FOR ANXIETY, Disp: 30 tablet, Rfl: 0    ARIPiprazole (ABILIFY) 2 mg tablet, Take 1 tablet (2 mg total) by mouth daily, Disp: 30 tablet, Rfl: 0    aspirin 325 mg tablet, Take by mouth, Disp: , Rfl:     busPIRone (BUSPAR) 15 mg tablet, TAKE 1 BY MOUTH THREE TIMES A DAY, Disp: 90 tablet, Rfl: 0    hydrOXYzine HCL (ATARAX) 25 mg tablet, Take by mouth, Disp: , Rfl:     mometasone (ELOCON) 0 1 % cream, Apply topically daily, Disp: , Rfl:     SUMAtriptan (IMITREX) 50 mg tablet, Take by mouth, Disp: , Rfl:     venlafaxine (EFFEXOR-XR) 75 mg 24 hr capsule, Take 1 capsule (75 mg total) by mouth daily, Disp: 30 capsule, Rfl: 0      Results for orders placed or performed in visit on 12/08/18   Comprehensive metabolic panel   Result Value Ref Range    Sodium 138 136 - 145 mmol/L    Potassium 4 1 3 5 - 5 3 mmol/L    Chloride 104 100 - 108 mmol/L    CO2 29 21 - 32 mmol/L    ANION GAP 5 4 - 13 mmol/L    BUN 18 5 - 25 mg/dL    Creatinine 0 98 0 60 - 1 30 mg/dL    Glucose, Fasting 86 65 - 99 mg/dL    Calcium 9 4 8 3 - 10 1 mg/dL    AST 20 5 - 45 U/L    ALT 26 12 - 78 U/L    Alkaline Phosphatase 67 46 - 116 U/L    Total Protein 7 4 6 4 - 8 2 g/dL    Albumin 3 5 3 5 - 5 0 g/dL    Total Bilirubin 0 31 0 20 - 1 00 mg/dL    eGFR 65 ml/min/1 73sq m   Lipid panel   Result Value Ref Range    Cholesterol 219 (H) 50 - 200 mg/dL    Triglycerides 117 <=150 mg/dL    HDL, Direct 75 (H) 40 - 60 mg/dL    LDL Calculated 121 (H) 0 - 100 mg/dL    Non-HDL-Chol (CHOL-HDL) 144 mg/dl   TSH, 3rd generation with Free T4 reflex   Result Value Ref Range    TSH 3RD GENERATON 1 900 0 358 - 3 740 uIU/mL       Objective     Lab Review   Appointment on 12/08/2018   Component Date Value    Sodium 12/08/2018 138     Potassium 12/08/2018 4 1     Chloride 12/08/2018 104     CO2 12/08/2018 29     ANION GAP 12/08/2018 5     BUN 12/08/2018 18     Creatinine 12/08/2018 0 98     Glucose, Fasting 12/08/2018 86     Calcium 12/08/2018 9 4     AST 12/08/2018 20     ALT 12/08/2018 26     Alkaline Phosphatase 12/08/2018 67     Total Protein 12/08/2018 7 4     Albumin 12/08/2018 3 5     Total Bilirubin 12/08/2018 0 31  eGFR 12/08/2018 65     Cholesterol 12/08/2018 219*    Triglycerides 12/08/2018 117     HDL, Direct 12/08/2018 75*    LDL Calculated 12/08/2018 121*    Non-HDL-Chol (CHOL-HDL) 12/08/2018 144     TSH 3RD GENERATON 12/08/2018 1 900    Office Visit on 11/08/2018   Component Date Value    Creatinine, Ur 12/08/2018 149 0     Microalbum  ,U,Random 12/08/2018 6 2     Microalb Creat Ratio 12/08/2018 4     Clarity, UA 12/08/2018 Cloudy     Color, UA 12/08/2018 Yellow     Specific Gravity, UA 12/08/2018 1 021     pH, UA 12/08/2018 5 5     Glucose, UA 12/08/2018 Negative     Ketones, UA 12/08/2018 Negative     Blood, UA 12/08/2018 Negative     Protein, UA 12/08/2018 Negative     Nitrite, UA 12/08/2018 Negative     Bilirubin, UA 12/08/2018 Negative     Urobilinogen, UA 12/08/2018 0 2     Leukocytes, UA 12/08/2018 Negative     WBC, UA 12/08/2018 4-10*    RBC, UA 12/08/2018 None Seen     Hyaline Casts, UA 12/08/2018 3-5*    Bacteria, UA 12/08/2018 Moderate*    Epithelial Cells 12/08/2018 Moderate*          Imaging: No results found  Physical Exam   Constitutional: She is oriented to person, place, and time  She appears well-developed and well-nourished  No distress  HENT:   Head: Normocephalic and atraumatic  Right Ear: External ear normal    Left Ear: External ear normal    Mouth/Throat: Oropharynx is clear and moist    Eyes: Pupils are equal, round, and reactive to light  Conjunctivae and EOM are normal  Right eye exhibits no discharge  Left eye exhibits no discharge  Neck: Normal range of motion  Neck supple  No thyromegaly present  Cardiovascular: Normal rate, regular rhythm and normal heart sounds  Exam reveals no friction rub  No murmur heard  Pulmonary/Chest: Effort normal and breath sounds normal  No respiratory distress  She has no wheezes  She has no rales  She exhibits no tenderness  Abdominal: Soft  Bowel sounds are normal  There is no tenderness     Genitourinary: No breast swelling, tenderness, discharge or bleeding  No labial fusion  There is no rash, tenderness, lesion or injury on the right labia  There is no rash, tenderness, lesion or injury on the left labia  Uterus is not deviated, not enlarged, not fixed and not tender  Cervix exhibits discharge  Cervix exhibits no motion tenderness and no friability  Right adnexum displays no mass, no tenderness and no fullness  Left adnexum displays no mass, no tenderness and no fullness  No erythema, tenderness or bleeding in the vagina  No foreign body in the vagina  No signs of injury around the vagina  Vaginal discharge found  Musculoskeletal: Normal range of motion  She exhibits no edema, tenderness or deformity  Lymphadenopathy:     She has no cervical adenopathy  Neurological: She is alert and oriented to person, place, and time  No cranial nerve deficit  Skin: Skin is warm and dry  No rash noted  She is not diaphoretic  Psychiatric: Her behavior is normal  Judgment and thought content normal    Very sad and tearful at this appt  Portions of the record may have been created with voice recognition software   Occasional wrong word or "sound a like" substitutions may have occurred due to the inherent limitations of voice recognition software   Read the chart carefully and recognize, using context, where substitutions have occurred

## 2018-12-21 LAB
HPV HR 12 DNA CVX QL NAA+PROBE: NEGATIVE
HPV16 DNA CVX QL NAA+PROBE: NEGATIVE
HPV18 DNA CVX QL NAA+PROBE: NEGATIVE

## 2018-12-24 LAB
LAB AP GYN PRIMARY INTERPRETATION: NORMAL
LAB AP LMP: NORMAL
Lab: NORMAL
PATH INTERP SPEC-IMP: NORMAL

## 2018-12-26 DIAGNOSIS — B37.3 YEAST VAGINITIS: Primary | ICD-10-CM

## 2018-12-26 RX ORDER — FLUCONAZOLE 150 MG/1
150 TABLET ORAL ONCE
Qty: 1 TABLET | Refills: 0 | Status: SHIPPED | OUTPATIENT
Start: 2018-12-26 | End: 2018-12-26

## 2019-01-02 DIAGNOSIS — F31.9 BIPOLAR DEPRESSION (HCC): ICD-10-CM

## 2019-01-02 RX ORDER — VENLAFAXINE HYDROCHLORIDE 75 MG/1
CAPSULE, EXTENDED RELEASE ORAL
Qty: 30 CAPSULE | Refills: 0 | Status: SHIPPED | OUTPATIENT
Start: 2019-01-02 | End: 2019-01-30 | Stop reason: SDUPTHER

## 2019-01-30 DIAGNOSIS — F31.9 BIPOLAR DEPRESSION (HCC): ICD-10-CM

## 2019-01-30 RX ORDER — VENLAFAXINE HYDROCHLORIDE 75 MG/1
CAPSULE, EXTENDED RELEASE ORAL
Qty: 30 CAPSULE | Refills: 0 | Status: SHIPPED | OUTPATIENT
Start: 2019-01-30 | End: 2019-02-28 | Stop reason: SDUPTHER

## 2019-02-27 DIAGNOSIS — F31.9 BIPOLAR DEPRESSION (HCC): ICD-10-CM

## 2019-02-27 RX ORDER — ARIPIPRAZOLE 2 MG/1
TABLET ORAL
Qty: 30 TABLET | Refills: 0 | Status: SHIPPED | OUTPATIENT
Start: 2019-02-27 | End: 2019-04-02 | Stop reason: SDUPTHER

## 2019-02-28 DIAGNOSIS — F31.9 BIPOLAR DEPRESSION (HCC): ICD-10-CM

## 2019-02-28 RX ORDER — VENLAFAXINE HYDROCHLORIDE 75 MG/1
CAPSULE, EXTENDED RELEASE ORAL
Qty: 30 CAPSULE | Refills: 0 | Status: SHIPPED | OUTPATIENT
Start: 2019-02-28 | End: 2019-03-04 | Stop reason: SDUPTHER

## 2019-03-02 DIAGNOSIS — F06.4 ANXIETY DISORDER DUE TO MEDICAL CONDITION: ICD-10-CM

## 2019-03-02 RX ORDER — ALPRAZOLAM 0.5 MG/1
0.5 TABLET ORAL 2 TIMES DAILY PRN
Qty: 30 TABLET | Refills: 0 | Status: SHIPPED | OUTPATIENT
Start: 2019-03-02 | End: 2019-12-24 | Stop reason: SDUPTHER

## 2019-03-04 ENCOUNTER — OFFICE VISIT (OUTPATIENT)
Dept: FAMILY MEDICINE CLINIC | Facility: CLINIC | Age: 59
End: 2019-03-04
Payer: COMMERCIAL

## 2019-03-04 VITALS
HEART RATE: 82 BPM | HEIGHT: 63 IN | TEMPERATURE: 97.5 F | OXYGEN SATURATION: 95 % | DIASTOLIC BLOOD PRESSURE: 70 MMHG | SYSTOLIC BLOOD PRESSURE: 122 MMHG | BODY MASS INDEX: 25.16 KG/M2 | WEIGHT: 142 LBS

## 2019-03-04 DIAGNOSIS — F41.9 ANXIETY: ICD-10-CM

## 2019-03-04 DIAGNOSIS — G47.00 INSOMNIA, UNSPECIFIED TYPE: ICD-10-CM

## 2019-03-04 DIAGNOSIS — F31.9 BIPOLAR DEPRESSION (HCC): ICD-10-CM

## 2019-03-04 DIAGNOSIS — F41.8 DEPRESSION WITH ANXIETY: Primary | ICD-10-CM

## 2019-03-04 DIAGNOSIS — F43.21 GRIEVING: ICD-10-CM

## 2019-03-04 PROCEDURE — 3008F BODY MASS INDEX DOCD: CPT | Performed by: FAMILY MEDICINE

## 2019-03-04 PROCEDURE — 99214 OFFICE O/P EST MOD 30 MIN: CPT | Performed by: FAMILY MEDICINE

## 2019-03-04 RX ORDER — VENLAFAXINE HYDROCHLORIDE 150 MG/1
150 CAPSULE, EXTENDED RELEASE ORAL DAILY
Qty: 30 CAPSULE | Refills: 1 | Status: SHIPPED | OUTPATIENT
Start: 2019-03-04 | End: 2019-03-29 | Stop reason: SDUPTHER

## 2019-03-04 RX ORDER — BUSPIRONE HYDROCHLORIDE 15 MG/1
TABLET ORAL
Qty: 90 TABLET | Refills: 1 | Status: SHIPPED | OUTPATIENT
Start: 2019-03-04 | End: 2019-05-31 | Stop reason: SDUPTHER

## 2019-03-04 NOTE — PATIENT INSTRUCTIONS
Discussed all with patient  Will increase the venlafaxine 250 mg daily in the morning  Will give a script for BuSpar as her pharmacy may not have BuSpar and may be on back order  [facility] [place] [facility] it  Try to pick it up over there  Strongly suggest you get a dog  This will get you out of the house and give you a reason to get out of bed every morning  Have the labs done and follow-up here in 2 weeks  For now use her Xanax routine to sleep but of ventrally will be able to cut that back

## 2019-03-04 NOTE — PROGRESS NOTES
Assessment/Plan:     Chronic Problems:  Depression with anxiety  Will increase the venlafaxine to 150 mg daily  Use the buspar up to 45 daily daily for anxiety  Visit Diagnosis:  Diagnoses and all orders for this visit:    Depression with anxiety    Insomnia, unspecified type  Comments: For now use the xanax for sleep, but this will change  Anxiety  -     busPIRone (BUSPAR) 15 mg tablet; Take 1 p  O  T i d  p r n  Anxiety  Bipolar depression (HCC)  -     venlafaxine (EFFEXOR-XR) 150 mg 24 hr capsule; Take 1 capsule (150 mg total) by mouth daily    Grieving  Comments:  Strongly suggest you continue in counseling and needs to get herself a dog  Subjective:    Patient ID: Arian Belcher is a 64 y o  female  Pt is here with c/o increased anxiety   Follows with Coby Chong  for counseling  Feels this is not really helping  Has been going once weekly  Plans on going to a grieving meeting  Did not get a dog yet  Therapist told her she may need her meds changed  Feels very shaky  Cries a lot  Takes xanax at night for sleep  Lies in bed from 7 pm to try to fall asleep and will take a 1/2 xanax and at midnight the other half  Hyperventilates at time  Does not want to go back to work  Takes all other meds as directed  No side effects noted  The following portions of the patient's history were reviewed and updated as appropriate: allergies, current medications, past family history, past medical history, past social history, past surgical history and problem list     Review of Systems   Constitutional: Negative for chills, diaphoresis, fatigue and fever  HENT: Negative  Eyes: Negative  Respiratory: Negative for cough, shortness of breath and wheezing  Hyperventilates at times  Cardiovascular: Positive for chest pain (when she hyperventilates with anxiety  )  Negative for palpitations  Gastrointestinal: Negative  Genitourinary: Negative      Neurological: Negative for dizziness, light-headedness and headaches  Psychiatric/Behavioral: Positive for dysphoric mood and sleep disturbance  The patient is nervous/anxious  /70   Pulse 82   Temp 97 5 °F (36 4 °C)   Ht 5' 3" (1 6 m)   Wt 64 4 kg (142 lb)   SpO2 95%   BMI 25 15 kg/m²   Social History     Socioeconomic History    Marital status: /Civil Union     Spouse name: Not on file    Number of children: Not on file    Years of education: Not on file    Highest education level: Not on file   Occupational History    Occupation: employed   Social Needs    Financial resource strain: Not on file    Food insecurity:     Worry: Not on file     Inability: Not on file   Orq[facility] Medical needs:     Medical: Not on file     Non-medical: Not on file   Tobacco Use    Smoking status: Former Smoker    Smokeless tobacco: Never Used   Substance and Sexual Activity    Alcohol use: Yes     Comment: social    Drug use: No    Sexual activity: Not on file   Lifestyle    Physical activity:     Days per week: Not on file     Minutes per session: Not on file    Stress: Not on file   Relationships    Social connections:     Talks on phone: Not on file     Gets together: Not on file     Attends Nondenominational service: Not on file     Active member of club or organization: Not on file     Attends meetings of clubs or organizations: Not on file     Relationship status: Not on file    Intimate partner violence:     Fear of current or ex partner: Not on file     Emotionally abused: Not on file     Physically abused: Not on file     Forced sexual activity: Not on file   Other Topics Concern    Not on file   Social History Narrative    Always uses seatbelt    Lives with spouse    No caffeine use     History reviewed  No pertinent past medical history    Family History   Problem Relation Age of Onset    Heart attack Mother         MI    Heart attack Father         MI    Migraines Sister     Heart attack Brother         MI History reviewed  No pertinent surgical history  Current Outpatient Medications:     ALPRAZolam (XANAX) 0 5 mg tablet, TAKE 1 TABLET (0 5 MG TOTAL) BY MOUTH 2 (TWO) TIMES A DAY AS NEEDED FOR ANXIETY, Disp: 30 tablet, Rfl: 0    ARIPiprazole (ABILIFY) 2 mg tablet, TAKE 1 TABLET BY MOUTH EVERY DAY, Disp: 30 tablet, Rfl: 0    busPIRone (BUSPAR) 15 mg tablet, Take 1 p  O  T i d  p r n  Anxiety  , Disp: 90 tablet, Rfl: 1    venlafaxine (EFFEXOR-XR) 150 mg 24 hr capsule, Take 1 capsule (150 mg total) by mouth daily, Disp: 30 capsule, Rfl: 1    aspirin 325 mg tablet, Take by mouth, Disp: , Rfl:     mometasone (ELOCON) 0 1 % cream, Apply topically daily, Disp: , Rfl:     SUMAtriptan (IMITREX) 50 mg tablet, Take by mouth, Disp: , Rfl:     No Known Allergies       Lab Review   No visits with results within 2 Month(s) from this visit  Latest known visit with results is:   Annual Exam on 12/18/2018   Component Date Value    Case Report 12/18/2018                      Value:Gynecologic Cytology Report                       Case: [id number]                                  Authorizing Provider:  MELECIO Garza Collected:           12/18/2018 1736              Ordering Location:     [facility]     Received:            12/18/2018 1737                                     Practice [address]                                                                  First Screen:          [place]mann, [place]                                                       Specimen:    LIQUID-BASED PAP, SCREENING, Cervix, Endocervical                                          Primary Interpretation 12/18/2018 Negative for intraepithelial lesion or malignancy     Interpretation 12/18/2018 Fungal organisms morphologically consistent with Candida spp     Specimen Adequacy 12/18/2018 Satisfactory for evaluation   Absence of endocervical/transformation zone component   Additional Information 12/18/2018                      Value: This result contains rich text formatting which cannot be displayed here   LMP 12/18/2018 9/1/2018     HPV Other HR 12/18/2018 Negative     HPV16 12/18/2018 Negative     HPV18 12/18/2018 Negative         Imaging: No results found  Objective:     Physical Exam   Constitutional: She is oriented to person, place, and time  She appears well-developed and well-nourished  No distress  HENT:   Head: Normocephalic and atraumatic  Eyes: Pupils are equal, round, and reactive to light  Conjunctivae and EOM are normal  Right eye exhibits no discharge  Left eye exhibits no discharge  Neck: Normal range of motion  Neck supple  Cardiovascular: Normal rate, regular rhythm and normal heart sounds  Exam reveals no friction rub  No murmur heard  Pulmonary/Chest: Effort normal and breath sounds normal  No respiratory distress  She has no wheezes  Abdominal: Soft  Bowel sounds are normal  There is no tenderness  Musculoskeletal: Normal range of motion  She exhibits no edema, tenderness or deformity  Lymphadenopathy:     She has no cervical adenopathy  Neurological: She is alert and oriented to person, place, and time  No cranial nerve deficit  Skin: Skin is warm and dry  No rash noted  She is not diaphoretic  Psychiatric: Her behavior is normal  Judgment and thought content normal    Very tearful at this appt  Patient Instructions   Discussed all with patient  Will increase the venlafaxine 250 mg daily in the morning  Will give a script for BuSpar as her pharmacy may not have BuSpar and may be on back order  [facility] it  Try to pick it up over there  Strongly suggest you get a dog  This will get you out of the house and give you a reason to get out of bed every morning  Have the labs done and follow-up here in 2 weeks    For now use her Xanax routine to sleep but of ventrally will be able to cut that back  MELECIO Garcia    Portions of the record may have been created with voice recognition software   Occasional wrong word or "sound a like" substitutions may have occurred due to the inherent limitations of voice recognition software   Read the chart carefully and recognize, using context, where substitutions have occurred

## 2019-03-29 ENCOUNTER — OFFICE VISIT (OUTPATIENT)
Dept: FAMILY MEDICINE CLINIC | Facility: CLINIC | Age: 59
End: 2019-03-29
Payer: COMMERCIAL

## 2019-03-29 VITALS
DIASTOLIC BLOOD PRESSURE: 90 MMHG | TEMPERATURE: 98.7 F | HEIGHT: 63 IN | SYSTOLIC BLOOD PRESSURE: 142 MMHG | HEART RATE: 80 BPM | OXYGEN SATURATION: 99 % | BODY MASS INDEX: 25.69 KG/M2 | RESPIRATION RATE: 16 BRPM | WEIGHT: 145 LBS

## 2019-03-29 DIAGNOSIS — F31.9 BIPOLAR DEPRESSION (HCC): ICD-10-CM

## 2019-03-29 DIAGNOSIS — L30.9 ECZEMA, UNSPECIFIED TYPE: ICD-10-CM

## 2019-03-29 DIAGNOSIS — F41.8 DEPRESSION WITH ANXIETY: Primary | ICD-10-CM

## 2019-03-29 PROCEDURE — 99214 OFFICE O/P EST MOD 30 MIN: CPT | Performed by: FAMILY MEDICINE

## 2019-03-29 PROCEDURE — 3008F BODY MASS INDEX DOCD: CPT | Performed by: FAMILY MEDICINE

## 2019-03-29 RX ORDER — VENLAFAXINE HYDROCHLORIDE 150 MG/1
150 CAPSULE, EXTENDED RELEASE ORAL DAILY
Qty: 30 CAPSULE | Refills: 3 | Status: SHIPPED | OUTPATIENT
Start: 2019-03-29 | End: 2019-05-31 | Stop reason: SDUPTHER

## 2019-03-29 NOTE — PATIENT INSTRUCTIONS
Discussed with patient  Continue on the same dose of venlafaxine for now  Stay in you grief counseling keep your follow-up with your counselor  I still suggest you get a dog to love  When your ready you need to think about it  Use of small film of mometasone to the eyelids once daily and put the mometasone also on the abdomen but get rid of the metal belts  Follow up here in about 3 months  Call for refills and  have your lab work done

## 2019-03-29 NOTE — PROGRESS NOTES
Assessment/Plan:     Chronic Problems:  Depression with anxiety  Stay on the same dose  Continue the current meds  Keep going with grief counseling and with your therapist        Visit Diagnosis:  Diagnoses and all orders for this visit:    Depression with anxiety    Eczema, unspecified type  Comments:  Advise to lose the metal belt  use the mometasone to the area on the abdomen and a thin film on the eyelids once daily  Other orders  -     Cancel: Hepatitis C antibody; Future          Subjective:    Patient ID: Raoul Guaman is a 64 y o  female  Pt is here for f/u on her depression and grieving  Goes to grief support tonight  This is her 3rd visit there  Also with 2 other women that lost their husbands  Getting out more  Thinks she is doing better  Has more energy to get out of bed  Walking her friends dog  Able to do things around the house  Still working  Also feels itchy  On the abdomen and on the eye lids   is gone 5 months tomorrow  Takes all other meds as directed  No side effects noted  The following portions of the patient's history were reviewed and updated as appropriate: allergies, current medications, past family history, past medical history, past social history, past surgical history and problem list     Review of Systems   Constitutional: Negative for chills and fever  HENT: Negative for ear pain, postnasal drip and sore throat  Eyes: Negative for pain and visual disturbance  Respiratory: Negative for cough, shortness of breath and wheezing  Cardiovascular: Negative for chest pain and palpitations  Gastrointestinal: Negative  Endocrine: Negative for cold intolerance, heat intolerance and polyuria  Genitourinary: Negative for dysuria, frequency and urgency  Musculoskeletal: Negative for arthralgias, gait problem and myalgias  Skin: Negative for pallor and rash  Neurological: Negative for dizziness, light-headedness and numbness     Psychiatric/Behavioral: Positive for dysphoric mood  Negative for suicidal ideas  The patient is not nervous/anxious  Still grieving but doing better  Goes to [facility] for grief counseling  Watches a dog that belongs to a friend  /90   Pulse 80   Temp 98 7 °F (37 1 °C)   Resp 16   Ht 5' 3" (1 6 m)   Wt 65 8 kg (145 lb)   SpO2 99%   BMI 25 69 kg/m²   Social History     Socioeconomic History    Marital status: /Civil Union     Spouse name: Not on file    Number of children: Not on file    Years of education: Not on file    Highest education level: Not on file   Occupational History    Occupation: employed   Social Needs    Financial resource strain: Not on file    Food insecurity:     Worry: Not on file     Inability: Not on file   [place] needs:     Medical: Not on file     Non-medical: Not on file   Tobacco Use    Smoking status: Former Smoker    Smokeless tobacco: Never Used   Substance and Sexual Activity    Alcohol use: Yes     Comment: social    Drug use: No    Sexual activity: Not on file   Lifestyle    Physical activity:     Days per week: Not on file     Minutes per session: Not on file    Stress: Not on file   Relationships    Social connections:     Talks on phone: Not on file     Gets together: Not on file     Attends Cheondoism service: Not on file     Active member of club or organization: Not on file     Attends meetings of clubs or organizations: Not on file     Relationship status: Not on file    Intimate partner violence:     Fear of current or ex partner: Not on file     Emotionally abused: Not on file     Physically abused: Not on file     Forced sexual activity: Not on file   Other Topics Concern    Not on file   Social History Narrative    Always uses seatbelt    Lives with spouse    No caffeine use     History reviewed  No pertinent past medical history    Family History   Problem Relation Age of Onset    Heart attack Mother         MICHAEL Perdomo Heart attack Father         MI    Migraines Sister     Heart attack Brother         MI     History reviewed  No pertinent surgical history  Current Outpatient Medications:     ALPRAZolam (XANAX) 0 5 mg tablet, TAKE 1 TABLET (0 5 MG TOTAL) BY MOUTH 2 (TWO) TIMES A DAY AS NEEDED FOR ANXIETY, Disp: 30 tablet, Rfl: 0    ARIPiprazole (ABILIFY) 2 mg tablet, TAKE 1 TABLET BY MOUTH EVERY DAY, Disp: 30 tablet, Rfl: 0    aspirin 325 mg tablet, Take by mouth, Disp: , Rfl:     busPIRone (BUSPAR) 15 mg tablet, Take 1 p  O  T i d  p r n  Anxiety  , Disp: 90 tablet, Rfl: 1    mometasone (ELOCON) 0 1 % cream, Apply topically daily, Disp: , Rfl:     SUMAtriptan (IMITREX) 50 mg tablet, Take by mouth, Disp: , Rfl:     venlafaxine (EFFEXOR-XR) 150 mg 24 hr capsule, Take 1 capsule (150 mg total) by mouth daily, Disp: 30 capsule, Rfl: 3    No Known Allergies       Lab Review   No visits with results within 2 Month(s) from this visit     Latest known visit with results is:   Annual Exam on 12/18/2018   Component Date Value    Case Report 12/18/2018                      Value:Gynecologic Cytology Report                       Case: [id number]                                  Authorizing Provider:  MELECIO Rivera Collected:           12/18/2018 4802              Ordering Location:     [facility]     Received:            12/18/2018 1737                                     Practice [address]                                                                  First Screen:          [place] Lathe, CT                                                       Specimen:    LIQUID-BASED PAP, SCREENING, Cervix, Endocervical                                          Primary Interpretation 12/18/2018 Negative for intraepithelial lesion or malignancy     Interpretation 12/18/2018 Fungal organisms morphologically consistent with Candida spp     Specimen Adequacy 12/18/2018 Satisfactory for evaluation  Absence of endocervical/transformation zone component   Additional Information 12/18/2018                      Value: This result contains rich text formatting which cannot be displayed here   LMP 12/18/2018 9/1/2018     HPV Other HR 12/18/2018 Negative     HPV16 12/18/2018 Negative     HPV18 12/18/2018 Negative         Imaging: No results found  Objective:     Physical Exam   Constitutional: She is oriented to person, place, and time  She appears well-developed and well-nourished  No distress  HENT:   Head: Normocephalic and atraumatic  Right Ear: External ear normal    Left Ear: External ear normal    Mouth/Throat: Oropharynx is clear and moist    Eyes: Pupils are equal, round, and reactive to light  Conjunctivae and EOM are normal  Right eye exhibits no discharge  Left eye exhibits no discharge  Neck: Normal range of motion  Neck supple  No thyromegaly present  Cardiovascular: Normal rate, regular rhythm and normal heart sounds  Exam reveals no friction rub  No murmur heard  BP by me 132/82  Pulmonary/Chest: Effort normal and breath sounds normal  No respiratory distress  She has no wheezes  She has no rales  She exhibits no tenderness  Abdominal: Soft  Bowel sounds are normal  There is no tenderness  Rash noted directly under large metal belt she is wearing  Musculoskeletal: Normal range of motion  She exhibits no edema, tenderness or deformity  Lymphadenopathy:     She has no cervical adenopathy  Neurological: She is alert and oriented to person, place, and time  No cranial nerve deficit  Skin: Skin is warm and dry  No rash noted  She is not diaphoretic  There is erythema (on upper lids  )  Psychiatric: She has a normal mood and affect  Her behavior is normal  Judgment and thought content normal          Patient Instructions   Discussed with patient    Continue on the same dose of venlafaxine for now   Stay in you grief counseling keep your follow-up with your counselor  I still suggest you get a dog to love  When your ready you need to think about it  Use of small film of mometasone to the eyelids once daily and put the mometasone also on the abdomen but get rid of the metal belts  Follow up here in about 3 months  Call for refills and  have your lab work done  MELECIO Garcia    Portions of the record may have been created with voice recognition software   Occasional wrong word or "sound a like" substitutions may have occurred due to the inherent limitations of voice recognition software   Read the chart carefully and recognize, using context, where substitutions have occurred

## 2019-03-29 NOTE — ASSESSMENT & PLAN NOTE
Stay on the same dose  Continue the current meds   Keep going with grief counseling and with your therapist

## 2019-04-02 DIAGNOSIS — F31.9 BIPOLAR DEPRESSION (HCC): ICD-10-CM

## 2019-04-02 RX ORDER — ARIPIPRAZOLE 2 MG/1
TABLET ORAL
Qty: 30 TABLET | Refills: 3 | Status: SHIPPED | OUTPATIENT
Start: 2019-04-02 | End: 2019-05-31 | Stop reason: SDUPTHER

## 2019-05-28 ENCOUNTER — TELEPHONE (OUTPATIENT)
Dept: FAMILY MEDICINE CLINIC | Facility: CLINIC | Age: 59
End: 2019-05-28

## 2019-05-31 ENCOUNTER — OFFICE VISIT (OUTPATIENT)
Dept: FAMILY MEDICINE CLINIC | Facility: CLINIC | Age: 59
End: 2019-05-31
Payer: COMMERCIAL

## 2019-05-31 VITALS
HEIGHT: 63 IN | HEART RATE: 86 BPM | DIASTOLIC BLOOD PRESSURE: 90 MMHG | OXYGEN SATURATION: 99 % | SYSTOLIC BLOOD PRESSURE: 126 MMHG | WEIGHT: 147 LBS | BODY MASS INDEX: 26.05 KG/M2

## 2019-05-31 DIAGNOSIS — F41.9 ANXIETY: ICD-10-CM

## 2019-05-31 DIAGNOSIS — F31.9 BIPOLAR DEPRESSION (HCC): ICD-10-CM

## 2019-05-31 DIAGNOSIS — T78.40XA ALLERGIC STATE, INITIAL ENCOUNTER: Primary | ICD-10-CM

## 2019-05-31 DIAGNOSIS — M54.31 SCIATICA OF RIGHT SIDE: ICD-10-CM

## 2019-05-31 PROCEDURE — 99214 OFFICE O/P EST MOD 30 MIN: CPT | Performed by: FAMILY MEDICINE

## 2019-05-31 PROCEDURE — 3008F BODY MASS INDEX DOCD: CPT | Performed by: FAMILY MEDICINE

## 2019-05-31 RX ORDER — BUSPIRONE HYDROCHLORIDE 15 MG/1
TABLET ORAL
Qty: 90 TABLET | Refills: 1 | Status: SHIPPED | OUTPATIENT
Start: 2019-05-31 | End: 2019-08-24 | Stop reason: SDUPTHER

## 2019-05-31 RX ORDER — MELOXICAM 15 MG/1
TABLET ORAL
Qty: 30 TABLET | Refills: 0 | Status: SHIPPED | OUTPATIENT
Start: 2019-05-31 | End: 2020-03-10

## 2019-05-31 RX ORDER — VENLAFAXINE HYDROCHLORIDE 150 MG/1
150 CAPSULE, EXTENDED RELEASE ORAL DAILY
Qty: 30 CAPSULE | Refills: 3 | Status: SHIPPED | OUTPATIENT
Start: 2019-05-31 | End: 2020-01-30 | Stop reason: SDUPTHER

## 2019-05-31 RX ORDER — METAXALONE 800 MG/1
800 TABLET ORAL 3 TIMES DAILY
Qty: 30 TABLET | Refills: 0 | Status: SHIPPED | OUTPATIENT
Start: 2019-05-31 | End: 2020-03-10 | Stop reason: SINTOL

## 2019-05-31 RX ORDER — ARIPIPRAZOLE 2 MG/1
2 TABLET ORAL DAILY
Qty: 30 TABLET | Refills: 3 | Status: SHIPPED | OUTPATIENT
Start: 2019-05-31 | End: 2019-12-19 | Stop reason: SDUPTHER

## 2019-05-31 RX ORDER — MOMETASONE FUROATE 1 MG/G
CREAM TOPICAL DAILY
Qty: 45 G | Refills: 0 | Status: SHIPPED | OUTPATIENT
Start: 2019-05-31 | End: 2021-11-23 | Stop reason: ALTCHOICE

## 2019-07-26 ENCOUNTER — APPOINTMENT (OUTPATIENT)
Dept: LAB | Facility: CLINIC | Age: 59
End: 2019-07-26
Payer: COMMERCIAL

## 2019-07-26 ENCOUNTER — TELEPHONE (OUTPATIENT)
Dept: FAMILY MEDICINE CLINIC | Facility: CLINIC | Age: 59
End: 2019-07-26

## 2019-07-26 DIAGNOSIS — R31.9 URINARY TRACT INFECTION WITH HEMATURIA, SITE UNSPECIFIED: ICD-10-CM

## 2019-07-26 DIAGNOSIS — N39.0 URINARY TRACT INFECTION WITH HEMATURIA, SITE UNSPECIFIED: Primary | ICD-10-CM

## 2019-07-26 DIAGNOSIS — R31.9 URINARY TRACT INFECTION WITH HEMATURIA, SITE UNSPECIFIED: Primary | ICD-10-CM

## 2019-07-26 DIAGNOSIS — N39.0 URINARY TRACT INFECTION WITH HEMATURIA, SITE UNSPECIFIED: ICD-10-CM

## 2019-07-26 LAB
BACTERIA UR QL AUTO: ABNORMAL /HPF
BILIRUB UR QL STRIP: NEGATIVE
CLARITY UR: CLEAR
COLOR UR: YELLOW
GLUCOSE UR STRIP-MCNC: NEGATIVE MG/DL
HGB UR QL STRIP.AUTO: ABNORMAL
HYALINE CASTS #/AREA URNS LPF: ABNORMAL /LPF
KETONES UR STRIP-MCNC: NEGATIVE MG/DL
LEUKOCYTE ESTERASE UR QL STRIP: ABNORMAL
NITRITE UR QL STRIP: NEGATIVE
NON-SQ EPI CELLS URNS QL MICRO: ABNORMAL /HPF
PH UR STRIP.AUTO: 6 [PH]
PROT UR STRIP-MCNC: NEGATIVE MG/DL
RBC #/AREA URNS AUTO: ABNORMAL /HPF
SP GR UR STRIP.AUTO: 1.01 (ref 1–1.03)
UROBILINOGEN UR QL STRIP.AUTO: 0.2 E.U./DL
WBC #/AREA URNS AUTO: ABNORMAL /HPF

## 2019-07-26 PROCEDURE — 81001 URINALYSIS AUTO W/SCOPE: CPT | Performed by: FAMILY MEDICINE

## 2019-07-26 PROCEDURE — 87186 SC STD MICRODIL/AGAR DIL: CPT

## 2019-07-26 PROCEDURE — 87077 CULTURE AEROBIC IDENTIFY: CPT

## 2019-07-26 PROCEDURE — 87086 URINE CULTURE/COLONY COUNT: CPT

## 2019-07-26 RX ORDER — CIPROFLOXACIN 250 MG/1
250 TABLET, FILM COATED ORAL EVERY 12 HOURS SCHEDULED
Qty: 10 TABLET | Refills: 0 | Status: SHIPPED | OUTPATIENT
Start: 2019-07-26 | End: 2019-07-31

## 2019-07-26 NOTE — TELEPHONE ENCOUNTER
Or she can go to a [facility]  I will put in the order for C&S  Will treat with cipro prior to the labs coming back as it sounds like a uti  Plenty of liquids  F/U if not better  We will call with c&s results

## 2019-07-26 NOTE — TELEPHONE ENCOUNTER
Spoke with patient she is going to stop at the lab after work and have the UA and C & S done and start the Cipro  She will f/u if no better   Yesi said Thank You

## 2019-07-26 NOTE — TELEPHONE ENCOUNTER
Pt called stating she is having burning while using the bathroom along with a bloody discharge on tissue after wiping  Did direct pt to stop in today for nurse visit for urine dip

## 2019-07-28 LAB — BACTERIA UR CULT: ABNORMAL

## 2019-07-29 ENCOUNTER — TELEPHONE (OUTPATIENT)
Dept: FAMILY MEDICINE CLINIC | Facility: CLINIC | Age: 59
End: 2019-07-29

## 2019-07-29 NOTE — TELEPHONE ENCOUNTER
----- Message from [address] [place] [address]  sent at 7/29/2019  7:28 AM EDT -----  Let her know that was the correct abx  How is she feeling?

## 2019-07-29 NOTE — TELEPHONE ENCOUNTER
Spoke with patient she said she never started the Cipro because she thought she had to wait until she heard back from us with the results  I explained to her that when we talked on Friday I had ment for her to give the urine specimen first and then she could have taken the pill afterwards  So I did let her know that next time she can take as soon as she leaves the labs and give the urine

## 2019-08-24 DIAGNOSIS — F41.9 ANXIETY: ICD-10-CM

## 2019-08-27 ENCOUNTER — TELEPHONE (OUTPATIENT)
Dept: FAMILY MEDICINE CLINIC | Facility: CLINIC | Age: 59
End: 2019-08-27

## 2019-09-01 RX ORDER — BUSPIRONE HYDROCHLORIDE 15 MG/1
TABLET ORAL
Qty: 90 TABLET | Refills: 1 | Status: SHIPPED | OUTPATIENT
Start: 2019-09-01 | End: 2020-05-11

## 2019-12-19 DIAGNOSIS — F31.9 BIPOLAR DEPRESSION (HCC): ICD-10-CM

## 2019-12-19 RX ORDER — ARIPIPRAZOLE 2 MG/1
TABLET ORAL
Qty: 30 TABLET | Refills: 3 | Status: SHIPPED | OUTPATIENT
Start: 2019-12-19 | End: 2020-05-11

## 2019-12-24 DIAGNOSIS — F06.4 ANXIETY DISORDER DUE TO MEDICAL CONDITION: ICD-10-CM

## 2019-12-24 RX ORDER — ALPRAZOLAM 0.5 MG/1
0.5 TABLET ORAL 2 TIMES DAILY PRN
Qty: 30 TABLET | Refills: 0 | Status: SHIPPED | OUTPATIENT
Start: 2019-12-24 | End: 2020-08-14 | Stop reason: ALTCHOICE

## 2020-01-26 DIAGNOSIS — F31.9 BIPOLAR DEPRESSION (HCC): ICD-10-CM

## 2020-01-27 NOTE — TELEPHONE ENCOUNTER
Spoke with patient and explained everything to her an how Important it is to be seen for f/u to make sure everything is ok for her and that the medicine is still helping   She is going to call me back to make an apt then I will send her request to another provider since Ayesha Fitzpatrick is out this week

## 2020-01-30 RX ORDER — VENLAFAXINE HYDROCHLORIDE 150 MG/1
CAPSULE, EXTENDED RELEASE ORAL
Qty: 30 CAPSULE | Refills: 0 | Status: SHIPPED | OUTPATIENT
Start: 2020-01-30 | End: 2020-02-26

## 2020-01-30 NOTE — TELEPHONE ENCOUNTER
Called patient to remind her she didn't call me back to make an apt  She made one now but couldn't find anything for her until the March 10th for the day and time she was looking for   She is all set now

## 2020-02-25 DIAGNOSIS — F31.9 BIPOLAR DEPRESSION (HCC): ICD-10-CM

## 2020-02-25 NOTE — TELEPHONE ENCOUNTER
Requested medication(s) are due for refill today: Yes, in a few days   Patient has already received a courtesy refill: No  Other reason request has been forwarded to provider: Requirements not met

## 2020-02-26 RX ORDER — VENLAFAXINE HYDROCHLORIDE 150 MG/1
CAPSULE, EXTENDED RELEASE ORAL
Qty: 30 CAPSULE | Refills: 0 | Status: SHIPPED | OUTPATIENT
Start: 2020-02-26 | End: 2020-03-10

## 2020-03-10 ENCOUNTER — OFFICE VISIT (OUTPATIENT)
Dept: FAMILY MEDICINE CLINIC | Facility: CLINIC | Age: 60
End: 2020-03-10
Payer: COMMERCIAL

## 2020-03-10 VITALS
RESPIRATION RATE: 16 BRPM | HEIGHT: 63 IN | OXYGEN SATURATION: 98 % | WEIGHT: 158.4 LBS | DIASTOLIC BLOOD PRESSURE: 80 MMHG | HEART RATE: 90 BPM | BODY MASS INDEX: 28.07 KG/M2 | SYSTOLIC BLOOD PRESSURE: 128 MMHG | TEMPERATURE: 99 F

## 2020-03-10 DIAGNOSIS — F41.8 DEPRESSION WITH ANXIETY: ICD-10-CM

## 2020-03-10 DIAGNOSIS — R63.5 WEIGHT GAIN: ICD-10-CM

## 2020-03-10 DIAGNOSIS — N91.2 AMENORRHEA: ICD-10-CM

## 2020-03-10 DIAGNOSIS — F31.9 BIPOLAR DEPRESSION (HCC): Primary | ICD-10-CM

## 2020-03-10 DIAGNOSIS — Z11.59 NEED FOR HEPATITIS C SCREENING TEST: ICD-10-CM

## 2020-03-10 DIAGNOSIS — Z13.220 SCREENING, LIPID: ICD-10-CM

## 2020-03-10 DIAGNOSIS — Z11.4 ENCOUNTER FOR SCREENING FOR HIV: ICD-10-CM

## 2020-03-10 PROCEDURE — 1036F TOBACCO NON-USER: CPT | Performed by: FAMILY MEDICINE

## 2020-03-10 PROCEDURE — 99214 OFFICE O/P EST MOD 30 MIN: CPT | Performed by: FAMILY MEDICINE

## 2020-03-10 PROCEDURE — 3008F BODY MASS INDEX DOCD: CPT | Performed by: FAMILY MEDICINE

## 2020-03-10 RX ORDER — VENLAFAXINE HYDROCHLORIDE 75 MG/1
75 TABLET, EXTENDED RELEASE ORAL
Qty: 30 TABLET | Refills: 0 | Status: SHIPPED | OUTPATIENT
Start: 2020-03-10 | End: 2020-04-10

## 2020-03-10 RX ORDER — HYDROCODONE BITARTRATE AND ACETAMINOPHEN 5; 325 MG/1; MG/1
1 TABLET ORAL
COMMUNITY
Start: 2020-02-19 | End: 2020-03-10

## 2020-03-10 RX ORDER — IBUPROFEN 600 MG/1
600 TABLET ORAL
COMMUNITY
Start: 2020-02-19 | End: 2020-03-10

## 2020-03-10 NOTE — ASSESSMENT & PLAN NOTE
Suspect this is menopause  Advised that if she gets another period after 1 year she will need a f u appt and work up

## 2020-03-10 NOTE — PATIENT INSTRUCTIONS
Discussed all with patient  I think you are doing great  I would like you to cut the venlafaxine to 75 mg daily  Stay on the Abilify  Have the labs done fasting but drink water before you go for the test as I am requesting urine on you as well  I am concerned about the 11 lb weight gain since the last appointment  Follow-up here in about 3 months but try to work on the weight  I will try to decrease your medications and now that your in a more stable situation in life take you down slowly and if you have anxiety use your BuSpar  Weight Management   AMBULATORY CARE:   Why it is important to manage your weight:  Being overweight increases your risk of health conditions such as heart disease, high blood pressure, type 2 diabetes, and certain types of cancer  It can also increase your risk for osteoarthritis, sleep apnea, and other respiratory problems  Aim for a slow, steady weight loss  Even a small amount of weight loss can lower your risk of health problems  How to lose weight safely:  A safe and healthy way to lose weight is to eat fewer calories and get regular exercise  You can lose up about 1 pound a week by decreasing the number of calories you eat by 500 calories each day  You can decrease calories by eating smaller portion sizes or by cutting out high-calorie foods  Read labels to find out how many calories are in the foods you eat  You can also burn calories with exercise such as walking, swimming, or biking  You will be more likely to keep weight off if you make these changes part of your lifestyle  Healthy meal plan for weight management:  A healthy meal plan includes a variety of foods, contains fewer calories, and helps you stay healthy  A healthy meal plan includes the following:  · Eat whole-grain foods more often  A healthy meal plan should contain fiber  Fiber is the part of grains, fruits, and vegetables that is not broken down by your body   Whole-grain foods are healthy and provide extra fiber in your diet  Some examples of whole-grain foods are whole-wheat breads and pastas, oatmeal, brown rice, and bulgur  · Eat a variety of vegetables every day  Include dark, leafy greens such as spinach, kale, madisyn greens, and mustard greens  Eat yellow and orange vegetables such as carrots, sweet potatoes, and winter squash  · Eat a variety of fruits every day  Choose fresh or canned fruit (canned in its own juice or light syrup) instead of juice  Fruit juice has very little or no fiber  · Eat low-fat dairy foods  Drink fat-free (skim) milk or 1% milk  Eat fat-free yogurt and low-fat cottage cheese  Try low-fat cheeses such as mozzarella and other reduced-fat cheeses  · Choose meat and other protein foods that are low in fat  Choose beans or other legumes such as split peas or lentils  Choose fish, skinless poultry (chicken or turkey), or lean cuts of red meat (beef or pork)  Before you cook meat or poultry, cut off any visible fat  · Use less fat and oil  Try baking foods instead of frying them  Add less fat, such as margarine, sour cream, regular salad dressing and mayonnaise to foods  Eat fewer high-fat foods  Some examples of high-fat foods include french fries, doughnuts, ice cream, and cakes  · Eat fewer sweets  Limit foods and drinks that are high in sugar  This includes candy, cookies, regular soda, and sweetened drinks  Ways to decrease calories:   · Eat smaller portions  ¨ Use a small plate with smaller servings  ¨ Do not eat second helpings  ¨ When you eat at a restaurant, ask for a box and place half of your meal in the box before you eat  ¨ Share an entrée with someone else  · Replace high-calorie snacks with healthy, low-calorie snacks  ¨ Choose fresh fruit, vegetables, fat-free rice cakes, or air-popped popcorn instead of potato chips, nuts, or chocolate  ¨ Choose water or calorie-free drinks instead of soda or sweetened drinks      · Eat regular meals  Skipping meals can lead to overeating later in the day  Eat a healthy snack in place of a meal if you do not have time to eat a regular meal      · Do not shop for groceries when you are hungry  You may be more likely to make unhealthy food choices  Take a grocery list of healthy foods and shop after you have eaten  Exercise:  Exercise at least 30 minutes per day on most days of the week  Some examples of exercise include walking, biking, dancing, and swimming  You can also fit in more physical activity by taking the stairs instead of the elevator or parking farther away from stores  Ask your healthcare provider about the best exercise plan for you  Other things to consider as you try to lose weight:   · Be aware of situations that may give you the urge to overeat, such as eating while watching television  Find ways to avoid these situations  For example, read a book, go for a walk, or do crafts  · Meet with a weight loss support group or friends who are also trying to lose weight  This may help you stay motivated to continue working on your weight loss goals  © 2017 [address] [facility] Information is for End User's use only and may not be sold, redistributed or otherwise used for commercial purposes  All illustrations and images included in CareNotes® are the copyrighted property of [employer]  or Kelvin Hughes  The above information is an  only  It is not intended as medical advice for individual conditions or treatments  Talk to your doctor, nurse or pharmacist before following any medical regimen to see if it is safe and effective for you  Low Fat Diet   AMBULATORY CARE:   A low-fat diet  is an eating plan that is low in total fat, unhealthy fat, and cholesterol  You may need to follow a low-fat diet if you have trouble digesting or absorbing fat  You may also need to follow this diet if you have high cholesterol   You can also lower your cholesterol by increasing the amount of fiber in your diet  Soluble fiber is a type of fiber that helps to decrease cholesterol levels  Different types of fat in food:   · Limit unhealthy fats  A diet that is high in cholesterol, saturated fat, and trans fat may cause unhealthy cholesterol levels  Unhealthy cholesterol levels increase your risk of heart disease  ¨ Cholesterol:  Limit intake of cholesterol to less than 200 mg per day  Cholesterol is found in meat, eggs, and dairy  ¨ Saturated fat:  Limit saturated fat to less than 7% of your total daily calories  Ask your dietitian how many calories you need each day  Saturated fat is found in butter, cheese, ice cream, whole milk, and palm oil  Saturated fat is also found in meat, such as beef, pork, chicken skin, and processed meats  Processed meats include sausage, hot dogs, and bologna  ¨ Trans fat:  Avoid trans fat as much as possible  Trans fat is used in fried and baked foods  Foods that say trans fat free on the label may still have up to 0 5 grams of trans fat per serving  · Include healthy fats  Replace foods that are high in saturated and trans fat with foods high in healthy fats  This may help to decrease high cholesterol levels  ¨ Monounsaturated fats: These are found in avocados, nuts, and vegetable oils, such as olive, canola, and sunflower oil  ¨ Polyunsaturated fats: These can be found in vegetable oils, such as soybean or corn oil  Omega-3 fats can help to decrease the risk of heart disease  Omega-3 fats are found in fish, such as salmon, herring, trout, and tuna  Omega-3 fats can also be found in plant foods, such as walnuts, flaxseed, soybeans, and canola oil    Foods to limit or avoid:   · Grains:      ¨ Snacks that are made with partially hydrogenated oils, such as chips, regular crackers, and butter-flavored popcorn    ¨ High-fat baked goods, such as biscuits, croissants, doughnuts, pies, cookies, and pastries    · Dairy: ¨ Whole milk, 2% milk, and yogurt and ice cream made with whole milk    ¨ Half and half creamer, heavy cream, and whipping cream    ¨ Cheese, cream cheese, and sour cream    · Meats and proteins:      ¨ High-fat cuts of meat (T-bone steak, regular hamburger, and ribs)    ¨ Fried meat, poultry (turkey and chicken), and fish    ¨ Poultry (chicken and turkey) with skin    ¨ Cold cuts (salami or bologna), hot dogs, young, and sausage    ¨ Whole eggs and egg yolks    · Vegetables and fruits with added fat:      ¨ Fried vegetables or vegetables in butter or high-fat sauces, such as cream or cheese sauces    ¨ Fried fruit or fruit served with butter or cream    · Fats:      ¨ Butter, stick margarine, and shortening    ¨ Coconut, palm oil, and palm kernel oil  Foods to include:   · Grains:      ¨ Whole-grain breads, cereals, pasta, and brown rice    ¨ Low-fat crackers and pretzels    · Vegetables and fruits:      ¨ Fresh, frozen, or canned vegetables (no salt or low-sodium)    ¨ Fresh, frozen, dried, or canned fruit (canned in light syrup or fruit juice)    ¨ Avocado    · Low-fat dairy products:      ¨ Nonfat (skim) or 1% milk    ¨ Nonfat or low-fat cheese, yogurt, and cottage cheese    · Meats and proteins:      ¨ Chicken or turkey with no skin    ¨ Baked or broiled fish    ¨ Lean beef and pork (loin, round, extra lean hamburger)    ¨ Beans and peas, unsalted nuts, soy products    ¨ Egg whites and substitutes    ¨ Seeds and nuts    · Fats:      ¨ Unsaturated oil, such as canola, olive, peanut, soybean, or sunflower oil    ¨ Soft or liquid margarine and vegetable oil spread    ¨ Low-fat salad dressing  Other ways to decrease fat:   · Read food labels before you buy foods  Choose foods that have less than 30% of calories from fat  Choose low-fat or fat-free dairy products  Remember that fat free does not mean calorie free  These foods still contain calories, and too many calories can lead to weight gain       · Trim fat from meat and avoid fried food  Trim all visible fat from meat before you cook it  Remove the skin from poultry  Do not foster meat, fish, or poultry  Bake, roast, boil, or broil these foods instead  Avoid fried foods  Eat a baked potato instead of Western Kathya fries  Steam vegetables instead of sautéing them in butter  · Add less fat to foods  Use imitation young bits on salads and baked potatoes instead of regular young bits  Use fat-free or low-fat salad dressings instead of regular dressings  Use low-fat or nonfat butter-flavored topping instead of regular butter or margarine on popcorn and other foods  Ways to decrease fat in recipes:  Replace high-fat ingredients with low-fat or nonfat ones  This may cause baked goods to be drier than usual  You may need to use nonfat cooking spray on pans to prevent food from sticking  You also may need to change the amount of other ingredients, such as water, in the recipe  Try the following:  · Use low-fat or light margarine instead of regular margarine or shortening  · Use lean ground turkey breast or chicken, or lean ground beef (less than 5% fat) instead of hamburger  · Add 1 teaspoon of canola oil to 8 ounces of skim milk instead of using cream or half and half  · Use grated zucchini, carrots, or apples in breads instead of coconut  · Use blenderized, low-fat cottage cheese, plain tofu, or low-fat ricotta cheese instead of cream cheese  · Use 1 egg white and 1 teaspoon of canola oil, or use ¼ cup (2 ounces) of fat-free egg substitute instead of a whole egg  · Replace half of the oil that is called for in a recipe with applesauce when you bake  Use 3 tablespoons of cocoa powder and 1 tablespoon of canola oil instead of a square of baking chocolate  How to increase fiber:  Eat enough high-fiber foods to get 20 to 30 grams of fiber every day  Slowly increase your fiber intake to avoid stomach cramps, gas, and other problems    · Eat 3 ounces of whole-grain foods each day  An ounce is about 1 slice of bread  Eat whole-grain breads, such as whole-wheat bread  Whole wheat, whole-wheat flour, or other whole grains should be listed as the first ingredient on the food label  Replace white flour with whole-grain flour or use half of each in recipes  Whole-grain flour is heavier than white flour, so you may have to add more yeast or baking powder  · Eat a high-fiber cereal for breakfast   Oatmeal is a good source of soluble fiber  Look for cereals that have bran or fiber in the name  Choose whole-grain products, such as brown rice, barley, and whole-wheat pasta  · Eat more beans, peas, and lentils  For example, add beans to soups or salads  Eat at least 5 cups of fruits and vegetables each day  Eat fruits and vegetables with the peel because the peel is high in fiber  © 2017 [facility] Information is for End User's use only and may not be sold, redistributed or otherwise used for commercial purposes  All illustrations and images included in CareNotes® are the copyrighted property of [employer]  or Kelvin Hughes  The above information is an  only  It is not intended as medical advice for individual conditions or treatments  Talk to your doctor, nurse or pharmacist before following any medical regimen to see if it is safe and effective for you  Heart Healthy Diet   AMBULATORY CARE:   A heart healthy diet  is an eating plan low in total fat, unhealthy fats, and sodium (salt)  A heart healthy diet helps decrease your risk for heart disease and stroke  Limit the amount of fat you eat to 25% to 35% of your total daily calories  Limit sodium to less than 2,300 mg each day  Healthy fats:  Healthy fats can help improve cholesterol levels   The risk for heart disease is decreased when cholesterol levels are normal  Choose healthy fats, such as the following:  · Unsaturated fat  is found in foods such as soybean, canola, olive, corn, and safflower oils  It is also found in soft tub margarine that is made with liquid vegetable oil  · Omega-3 fat  is found in certain fish, such as salmon, tuna, and trout, and in walnuts and flaxseed  Unhealthy fats:  Unhealthy fats can cause unhealthy cholesterol levels in your blood and increase your risk of heart disease  Limit unhealthy fats, such as the following:  · Cholesterol  is found in animal foods, such as eggs and lobster, and in dairy products made from whole milk  Limit cholesterol to less than 300 milligrams (mg) each day  You may need to limit cholesterol to 200 mg each day if you have heart disease  · Saturated fat  is found in meats, such as young and hamburger  It is also found in chicken or turkey skin, whole milk, and butter  Limit saturated fat to less than 7% of your total daily calories  Limit saturated fat to less than 6% if you have heart disease or are at increased risk for it  · Trans fat  is found in packaged foods, such as potato chips and cookies  It is also in hard margarine, some fried foods, and shortening  Avoid trans fats as much as possible    Heart healthy foods and drinks to include:  Ask your dietitian or healthcare provider how many servings to have from each of the following food groups:  · Grains:      ¨ Whole-wheat breads, cereals, and pastas, and brown rice    ¨ Low-fat, low-sodium crackers and chips    · Vegetables:      ¨ Broccoli, green beans, green peas, and spinach    ¨ Collards, kale, and lima beans    ¨ Carrots, sweet potatoes, tomatoes, and peppers    ¨ Canned vegetables with no salt added    · Fruits:      ¨ Bananas, peaches, pears, and pineapple    ¨ Grapes, raisins, and dates    ¨ Oranges, tangerines, grapefruit, orange juice, and grapefruit juice    ¨ Apricots, mangoes, melons, and papaya    ¨ Raspberries and strawberries    ¨ Canned fruit with no added sugar    · Low-fat dairy products:      ¨ Nonfat (skim) milk, 1% milk, and low-fat almond, cashew, or soy milks fortified with calcium    ¨ Low-fat cheese, regular or frozen yogurt, and cottage cheese    · Meats and proteins , such as lean cuts of beef and pork (loin, leg, round), skinless chicken and turkey, legumes, soy products, egg whites, and nuts  Foods and drinks to limit or avoid:  Ask your dietitian or healthcare provider about these and other foods that are high in unhealthy fat, sodium, and sugar:  · Snack or packaged foods , such as frozen dinners, cookies, macaroni and cheese, and cereals with more than 300 mg of sodium per serving    · Canned or dry mixes  for cakes, soups, sauces, or gravies    · Vegetables with added sodium , such as instant potatoes, vegetables with added sauces, or regular canned vegetables    · Other foods high in sodium , such as ketchup, barbecue sauce, salad dressing, pickles, olives, soy sauce, and miso    · High-fat dairy foods  such as whole or 2% milk, cream cheese, or sour cream, and cheeses     · High-fat protein foods  such as high-fat cuts of beef (T-bone steaks, ribs), chicken or turkey with skin, and organ meats, such as liver    · Cured or smoked meats , such as hot dogs, young, and sausage    · Unhealthy fats and oils , such as butter, stick margarine, shortening, and cooking oils such as coconut or palm oil    · Food and drinks high in sugar , such as soft drinks (soda), sports drinks, sweetened tea, candy, cake, cookies, pies, and doughnuts  Other diet guidelines to follow:   · Eat more foods containing omega-3 fats  Eat fish high in omega-3 fats at least 2 times a week  · Limit alcohol  Too much alcohol can damage your heart and raise your blood pressure  Women should limit alcohol to 1 drink a day  Men should limit alcohol to 2 drinks a day  A drink of alcohol is 12 ounces of beer, 5 ounces of wine, or 1½ ounces of liquor  · Choose low-sodium foods  High-sodium foods can lead to high blood pressure   Add little or no salt to food you prepare  Use herbs and spices in place of salt  · Eat more fiber  to help lower cholesterol levels  Eat at least 5 servings of fruits and vegetables each day  Eat 3 ounces of whole-grain foods each day  Legumes (beans) are also a good source of fiber  Lifestyle guidelines:   · Do not smoke  Nicotine and other chemicals in cigarettes and cigars can cause lung and heart damage  Ask your healthcare provider for information if you currently smoke and need help to quit  E-cigarettes or smokeless tobacco still contain nicotine  Talk to your healthcare provider before you use these products  · Exercise regularly  to help you maintain a healthy weight and improve your blood pressure and cholesterol levels  Ask your healthcare provider about the best exercise plan for you  Do not start an exercise program without asking your healthcare provider  Follow up with your healthcare provider as directed:  Write down your questions so you remember to ask them during your visits  © 2017 [address] [employer] Information is for End User's use only and may not be sold, redistributed or otherwise used for commercial purposes  All illustrations and images included in CareNotes® are the copyrighted property of [employer]  or Kelvin Hughes  The above information is an  only  It is not intended as medical advice for individual conditions or treatments  Talk to your doctor, nurse or pharmacist before following any medical regimen to see if it is safe and effective for you  Calorie Counting Diet   WHAT YOU NEED TO KNOW:   What is a calorie counting diet? It is a meal plan based on counting calories each day to reach a healthy body weight  You will need to eat fewer calories if you are trying to lose weight  Weight loss may decrease your risk for certain health problems or improve your health if you have health problems   Some of these health problems include heart disease, high blood pressure, and diabetes  What foods should I avoid? Your dietitian will tell you if you need to avoid certain foods based on your body weight and health condition  You may need to avoid high-fat foods if you are at risk for or have heart disease  You may need to eat fewer foods from the breads and starches food group if you have diabetes  How many calories are in foods? The following is a list of foods and drinks with the approximate number of calories in each  Check the food label to find the exact number of calories  A dietitian can tell you how many calories you should have from each food group each day    · Carbohydrate:      ¨ ½ of a 3-inch bagel, 1 slice of bread, or ½ of a hamburger bun or hot dog bun (80)    ¨ 1 (8-inch) flour tortilla or ½ cup of cooked rice (100)    ¨ 1 (6-inch) corn tortilla (80)    ¨ 1 (6-inch) pancake or 1 cup of bran flakes cereal (110)    ¨ ½ cup of cooked cereal (80)    ¨ ½ cup of cooked pasta (85)    ¨ 1 ounce of pretzels (100)    ¨ 3 cups of air-popped popcorn without butter or oil (80)    · Dairy:      ¨ 1 cup of skim or 1% milk (90)    ¨ 1 cup of 2% milk (120)    ¨ 1 cup of whole milk (160)    ¨ 1 cup of 2% chocolate milk (220)    ¨ 1 ounce of low-fat cheese with 3 grams of fat per ounce (70)    ¨ 1 ounce of cheddar cheese (114)    ¨ ½ cup of 1% fat cottage cheese (80)    ¨ 1 cup of plain or sugar-free, fat-free yogurt (90)    · Protein foods:      ¨ 3 ounces of fish (not breaded or fried) (95)    ¨ 3 ounces of breaded, fried fish (195)    ¨ ¾ cup of tuna canned in water (105)    ¨ 3 ounces of chicken breast without skin (105)    ¨ 1 fried chicken breast with skin (350)    ¨ ¼ cup of fat free egg substitute (40)    ¨ 1 large egg (75)    ¨ 3 ounces of lean beef or pork (165)    ¨ 3 ounces of fried pork chop or ham (185)    ¨ ½ cup of cooked dried beans, such as kidney, velasquez, lentils, or navy (115)    ¨ 3 ounces of bologna or lunch meat (225)    ¨ 2 links of breakfast sausage (140)    · Vegetables:      ¨ ½ cup of sliced mushrooms (10)    ¨ 1 cup of salad greens, such as lettuce, spinach, or lazaro (15)    ¨ ½ cup of steamed asparagus (20)    ¨ ½ cup of cooked summer squash, zucchini squash, or green or wax beans (25)    ¨ 1 cup of broccoli or cauliflower florets, or 1 medium tomato (25)    ¨ 1 large raw carrot or ½ cup of cooked carrots (40)    ¨ ? of a medium cucumber or 1 stalk of celery (5)    ¨ 1 small baked potato (160)    ¨ 1 cup of breaded, fried vegetables (230)    · Fruit:      ¨ 1 (6-inch) banana (55)     ¨ ½ of a 4-inch grapefruit (55)    ¨ 15 grapes (60)    ¨ 1 medium orange or apple (70)    ¨ 1 large peach (65)    ¨ 1 cup of fresh pineapple chunks (75)    ¨ 1 cup of melon cubes (50)    ¨ 1¼ cups of whole strawberries (45)    ¨ ½ cup of fruit canned in juice (55)    ¨ ½ cup of fruit canned in heavy syrup (110)    ¨ ?  cup of raisins (130)    ¨ ½ cup of unsweetened fruit juice (60)    ¨ ½ cup of grape, cranberry, or prune juice (90)    · Fat:      ¨ 10 peanuts or 2 teaspoons of peanut butter (55)    ¨ 2 tablespoons of avocado or 1 tablespoon of regular salad dressing (45)    ¨ 2 slices of young (90)    ¨ 1 teaspoon of oil, such as safflower, canola, corn, or olive oil (45)    ¨ 2 teaspoons of low-fat margarine, or 1 tablespoon of low-fat mayonnaise (50)    ¨ 1 teaspoon of regular margarine (40)    ¨ 1 tablespoon of regular mayonnaise (135)    ¨ 1 tablespoon of cream cheese or 2 tablespoons of low-fat cream cheese (45)    ¨ 2 tablespoons of vegetable shortening (215)    · Dessert and sweets:      ¨ 8 animal crackers or 5 vanilla wafers (80)    ¨ 1 frozen fruit juice bar (80)    ¨ ½ cup of ice milk or low-fat frozen yogurt (90)    ¨ ½ cup of sherbet or sorbet (125)    ¨ ½ cup of sugar-free pudding or custard (60)    ¨ ½ cup of ice cream (140)    ¨ ½ cup of pudding or custard (175)    ¨ 1 (2-inch) square chocolate brownie (185)    · Combination foods:      ¨ Bean burrito made with an 8-inch tortilla, without cheese (275)    ¨ Chicken breast sandwich with lettuce and tomato (325)    ¨ 1 cup of chicken noodle soup (60)    ¨ 1 beef taco (175)    ¨ Regular hamburger with lettuce and tomato (310)    ¨ Regular cheeseburger with lettuce and tomato (410)     ¨ ¼ of a 12-inch cheese pizza (280)    ¨ Fried fish sandwich with lettuce and tomato (425)    ¨ Hot dog and bun (275)    ¨ 1½ cups of macaroni and cheese (310)    ¨ Taco salad with a fried tortilla shell (870)    · Low-calorie foods:      ¨ 1 tablespoon of ketchup or 1 tablespoon of fat free sour cream (15)    ¨ 1 teaspoon of mustard (5)    ¨ ¼ cup of salsa (20)    ¨ 1 large dill pickle (15)    ¨ 1 tablespoon of fat free salad dressing (10)    ¨ 2 teaspoons of low-sugar, light jam or jelly, or 1 tablespoon of sugar-free syrup (15)    ¨ 1 sugar-free popsicle (15)    ¨ 1 cup of club soda, seltzer water, or diet soda (0)  CARE AGREEMENT:   You have the right to help plan your care  Discuss treatment options with your caregivers to decide what care you want to receive  You always have the right to refuse treatment  The above information is an  only  It is not intended as medical advice for individual conditions or treatments  Talk to your doctor, nurse or pharmacist before following any medical regimen to see if it is safe and effective for you  © 2017 [address] Information is for End User's use only and may not be sold, redistributed or otherwise used for commercial purposes  All illustrations and images included in CareNotes® are the copyrighted property of [employer]  or Kelvin Hughes

## 2020-03-10 NOTE — ASSESSMENT & PLAN NOTE
Pt still with anxiety but depression is better  Use the buspar as needed, but take 75 mg venlafaxine  Let me know if you have any problems on this lower dose

## 2020-03-10 NOTE — PROGRESS NOTES
Assessment/Plan:     Chronic Problems:  Bipolar depression ([facility] [address] )  Doing well on low dose of abilify  Stay on the med/     Depression with anxiety  Pt still with anxiety but depression is better  Use the buspar as needed, but take 75 mg venlafaxine  Let me know if you have any problems on this lower dose  Amenorrhea  Suspect this is menopause  Advised that if she gets another period after 1 year she will need a f u appt and work up  Visit Diagnosis:  Diagnoses and all orders for this visit:    Bipolar depression ([facility] [address] )  -     venlafaxine 75 mg 24 hr tablet; Take 1 tablet (75 mg total) by mouth daily with breakfast    BMI 28 0-28 9,adult  Comments: With ll lb weight gain  Cutting back on venlafaxine may help  Advised to work on the weight and increase exercise  Depression with anxiety    Need for hepatitis C screening test  -     Hepatitis C antibody; Future    Encounter for screening for HIV  -     HIV 1/2 Antigen/Antibody (4th Generation) w Reflex [facility]; Future    Screening, lipid  -     Comprehensive metabolic panel; Future  -     Lipid panel; Future  -     Microalbumin / creatinine urine ratio  -     Urinalysis with microscopic    Amenorrhea  -     FSH and LH; Future    Weight gain  Comments:  11 lbs since last visit  Will check tsh and call with results  Orders:  -     TSH, 3rd generation with Free T4 reflex; Future    Other orders  -     Discontinue: HYDROcodone-acetaminophen (NORCO) 5-325 mg per tablet; Take 1 tablet by mouth  -     Discontinue: ibuprofen (MOTRIN) 600 mg tablet; Take 600 mg by mouth          Subjective:    Patient ID: William Romero is a 64 y o  female  Pt is here for f/u appt  Feels she is doing well  Has a new boyfriend and doing well  Pt states she had an allergic reaction to metaxolone and meloxicam and felt her throat was closing  Could not breathe or talk  Feels her mood is much better since the boyfriend and wants to cut back on her meds   Would like to go down on the venlafaxine  Not in counseling and doing well  Went for a while but stopped  No complaints today but no menses since July  Takes all other meds as directed  No side effects noted  The following portions of the patient's history were reviewed and updated as appropriate: allergies, current medications, past family history, past medical history, past social history, past surgical history and problem list     Review of Systems   Constitutional: Negative for chills, diaphoresis, fatigue and fever  HENT: Negative  Eyes: Negative  Respiratory: Negative for cough, shortness of breath and wheezing  Cardiovascular: Negative for chest pain and palpitations  Gastrointestinal: Negative for abdominal pain, constipation, diarrhea, nausea and vomiting  Genitourinary: Negative for dysuria, frequency and urgency  FDP - July 2019   Musculoskeletal: Negative for arthralgias and myalgias  Just getting old  Neurological: Negative for dizziness, light-headedness and headaches  Psychiatric/Behavioral: Negative for dysphoric mood  The patient is nervous/anxious            /80   Pulse 90   Temp 99 °F (37 2 °C) (Tympanic)   Resp 16   Ht 5' 3" (1 6 m)   Wt 71 8 kg (158 lb 6 4 oz)   SpO2 98%   BMI 28 06 kg/m²   Social History     Socioeconomic History    Marital status: /Civil Union     Spouse name: Not on file    Number of children: Not on file    Years of education: Not on file    Highest education level: Not on file   Occupational History    Occupation: employed   Social Needs    Financial resource strain: Not on file    Food insecurity:     Worry: Not on file     Inability: Not on file   [employer] needs:     Medical: Not on file     Non-medical: Not on file   Tobacco Use    Smoking status: Former Smoker    Smokeless tobacco: Never Used   Substance and Sexual Activity    Alcohol use: Yes     Comment: social    Drug use: No    Sexual activity: Not on file Lifestyle    Physical activity:     Days per week: Not on file     Minutes per session: Not on file    Stress: Not on file   Relationships    Social connections:     Talks on phone: Not on file     Gets together: Not on file     Attends Jainism service: Not on file     Active member of club or organization: Not on file     Attends meetings of clubs or organizations: Not on file     Relationship status: Not on file    Intimate partner violence:     Fear of current or ex partner: Not on file     Emotionally abused: Not on file     Physically abused: Not on file     Forced sexual activity: Not on file   Other Topics Concern    Not on file   Social History Narrative    Always uses seatbelt    Lives with spouse    No caffeine use     History reviewed  No pertinent past medical history  Family History   Problem Relation Age of Onset    Heart attack Mother         MI    Heart attack Father         MI    Migraines Sister     Heart attack Brother         MI     History reviewed  No pertinent surgical history  Current Outpatient Medications:     ALPRAZolam (XANAX) 0 5 mg tablet, TAKE 1 TABLET (0 5 MG TOTAL) BY MOUTH 2 (TWO) TIMES A DAY AS NEEDED FOR ANXIETY, Disp: 30 tablet, Rfl: 0    ARIPiprazole (ABILIFY) 2 mg tablet, TAKE 1 TABLET BY MOUTH EVERY DAY, Disp: 30 tablet, Rfl: 3    busPIRone (BUSPAR) 15 mg tablet, TAKE 1 TABLET 3 TIMES A DAY AS NEEDED FOR ANXIETY, Disp: 90 tablet, Rfl: 1    mometasone (ELOCON) 0 1 % cream, Apply topically daily To eyelids only  , Disp: 45 g, Rfl: 0    SUMAtriptan (IMITREX) 50 mg tablet, Take by mouth, Disp: , Rfl:     venlafaxine 75 mg 24 hr tablet, Take 1 tablet (75 mg total) by mouth daily with breakfast, Disp: 30 tablet, Rfl: 0    Allergies   Allergen Reactions    Meloxicam Throat Swelling    Skelaxin [Metaxalone] Throat Swelling          Lab Review   No visits with results within 2 Month(s) from this visit     Latest known visit with results is:   Appointment on 07/26/2019   Component Date Value    Urine Culture 07/26/2019 10,000-19,000 cfu/ml Escherichia coli*        Imaging: No results found  Objective:     Physical Exam   Constitutional: She is oriented to person, place, and time  She appears well-developed and well-nourished  No distress  HENT:   Head: Normocephalic and atraumatic  Right Ear: External ear normal    Left Ear: External ear normal    Mouth/Throat: Oropharynx is clear and moist    Sutures noted in mouth  Eyes: Pupils are equal, round, and reactive to light  Conjunctivae and EOM are normal  Right eye exhibits no discharge  Left eye exhibits no discharge  Neck: Normal range of motion  Neck supple  Cardiovascular: Normal rate, regular rhythm and normal heart sounds  Exam reveals no friction rub  No murmur heard  Pulmonary/Chest: Effort normal and breath sounds normal  No respiratory distress  She has no wheezes  She has no rales  Abdominal: Soft  Bowel sounds are normal  There is no tenderness  Musculoskeletal: Normal range of motion  She exhibits no edema, tenderness or deformity  Lymphadenopathy:     She has no cervical adenopathy  Neurological: She is alert and oriented to person, place, and time  No cranial nerve deficit  Skin: Skin is warm and dry  No rash noted  She is not diaphoretic  Psychiatric: She has a normal mood and affect  Her behavior is normal  Judgment and thought content normal          Patient Instructions   Discussed all with patient  I think you are doing great  I would like you to cut the venlafaxine to 75 mg daily  Stay on the Abilify  Have the labs done fasting but drink water before you go for the test as I am requesting urine on you as well  I am concerned about the 11 lb weight gain since the last appointment  Follow-up here in about 3 months but try to work on the weight    I will try to decrease your medications and now that your in a more stable situation in life take you down slowly and if you have anxiety use your BuSpar  Weight Management   AMBULATORY CARE:   Why it is important to manage your weight:  Being overweight increases your risk of health conditions such as heart disease, high blood pressure, type 2 diabetes, and certain types of cancer  It can also increase your risk for osteoarthritis, sleep apnea, and other respiratory problems  Aim for a slow, steady weight loss  Even a small amount of weight loss can lower your risk of health problems  How to lose weight safely:  A safe and healthy way to lose weight is to eat fewer calories and get regular exercise  You can lose up about 1 pound a week by decreasing the number of calories you eat by 500 calories each day  You can decrease calories by eating smaller portion sizes or by cutting out high-calorie foods  Read labels to find out how many calories are in the foods you eat  You can also burn calories with exercise such as walking, swimming, or biking  You will be more likely to keep weight off if you make these changes part of your lifestyle  Healthy meal plan for weight management:  A healthy meal plan includes a variety of foods, contains fewer calories, and helps you stay healthy  A healthy meal plan includes the following:  · Eat whole-grain foods more often  A healthy meal plan should contain fiber  Fiber is the part of grains, fruits, and vegetables that is not broken down by your body  Whole-grain foods are healthy and provide extra fiber in your diet  Some examples of whole-grain foods are whole-wheat breads and pastas, oatmeal, brown rice, and bulgur  · Eat a variety of vegetables every day  Include dark, leafy greens such as spinach, kale, madisyn greens, and mustard greens  Eat yellow and orange vegetables such as carrots, sweet potatoes, and winter squash  · Eat a variety of fruits every day  Choose fresh or canned fruit (canned in its own juice or light syrup) instead of juice   Fruit juice has very little or no fiber     · Eat low-fat dairy foods  Drink fat-free (skim) milk or 1% milk  Eat fat-free yogurt and low-fat cottage cheese  Try low-fat cheeses such as mozzarella and other reduced-fat cheeses  · Choose meat and other protein foods that are low in fat  Choose beans or other legumes such as split peas or lentils  Choose fish, skinless poultry (chicken or turkey), or lean cuts of red meat (beef or pork)  Before you cook meat or poultry, cut off any visible fat  · Use less fat and oil  Try baking foods instead of frying them  Add less fat, such as margarine, sour cream, regular salad dressing and mayonnaise to foods  Eat fewer high-fat foods  Some examples of high-fat foods include french fries, doughnuts, ice cream, and cakes  · Eat fewer sweets  Limit foods and drinks that are high in sugar  This includes candy, cookies, regular soda, and sweetened drinks  Ways to decrease calories:   · Eat smaller portions  ¨ Use a small plate with smaller servings  ¨ Do not eat second helpings  ¨ When you eat at a restaurant, ask for a box and place half of your meal in the box before you eat  ¨ Share an entrée with someone else  · Replace high-calorie snacks with healthy, low-calorie snacks  ¨ Choose fresh fruit, vegetables, fat-free rice cakes, or air-popped popcorn instead of potato chips, nuts, or chocolate  ¨ Choose water or calorie-free drinks instead of soda or sweetened drinks  · Eat regular meals  Skipping meals can lead to overeating later in the day  Eat a healthy snack in place of a meal if you do not have time to eat a regular meal      · Do not shop for groceries when you are hungry  You may be more likely to make unhealthy food choices  Take a grocery list of healthy foods and shop after you have eaten  Exercise:  Exercise at least 30 minutes per day on most days of the week  Some examples of exercise include walking, biking, dancing, and swimming   You can also fit in more physical activity by taking the stairs instead of the elevator or parking farther away from stores  Ask your healthcare provider about the best exercise plan for you  Other things to consider as you try to lose weight:   · Be aware of situations that may give you the urge to overeat, such as eating while watching television  Find ways to avoid these situations  For example, read a book, go for a walk, or do crafts  · Meet with a weight loss support group or friends who are also trying to lose weight  This may help you stay motivated to continue working on your weight loss goals  © 2017 [address]  Information is for End User's use only and may not be sold, redistributed or otherwise used for commercial purposes  All illustrations and images included in CareNotes® are the copyrighted property of [employer]  or Kelvin [employer]  The above information is an  only  It is not intended as medical advice for individual conditions or treatments  Talk to your doctor, nurse or pharmacist before following any medical regimen to see if it is safe and effective for you  Low Fat Diet   AMBULATORY CARE:   A low-fat diet  is an eating plan that is low in total fat, unhealthy fat, and cholesterol  You may need to follow a low-fat diet if you have trouble digesting or absorbing fat  You may also need to follow this diet if you have high cholesterol  You can also lower your cholesterol by increasing the amount of fiber in your diet  Soluble fiber is a type of fiber that helps to decrease cholesterol levels  Different types of fat in food:   · Limit unhealthy fats  A diet that is high in cholesterol, saturated fat, and trans fat may cause unhealthy cholesterol levels  Unhealthy cholesterol levels increase your risk of heart disease  ¨ Cholesterol:  Limit intake of cholesterol to less than 200 mg per day  Cholesterol is found in meat, eggs, and dairy      ¨ Saturated fat:  Limit saturated fat to less than 7% of your total daily calories  Ask your dietitian how many calories you need each day  Saturated fat is found in butter, cheese, ice cream, whole milk, and palm oil  Saturated fat is also found in meat, such as beef, pork, chicken skin, and processed meats  Processed meats include sausage, hot dogs, and bologna  ¨ Trans fat:  Avoid trans fat as much as possible  Trans fat is used in fried and baked foods  Foods that say trans fat free on the label may still have up to 0 5 grams of trans fat per serving  · Include healthy fats  Replace foods that are high in saturated and trans fat with foods high in healthy fats  This may help to decrease high cholesterol levels  ¨ Monounsaturated fats: These are found in avocados, nuts, and vegetable oils, such as olive, canola, and sunflower oil  ¨ Polyunsaturated fats: These can be found in vegetable oils, such as soybean or corn oil  Omega-3 fats can help to decrease the risk of heart disease  Omega-3 fats are found in fish, such as salmon, herring, trout, and tuna  Omega-3 fats can also be found in plant foods, such as walnuts, flaxseed, soybeans, and canola oil    Foods to limit or avoid:   · Grains:      ¨ Snacks that are made with partially hydrogenated oils, such as chips, regular crackers, and butter-flavored popcorn    ¨ High-fat baked goods, such as biscuits, croissants, doughnuts, pies, cookies, and pastries    · Dairy:      ¨ Whole milk, 2% milk, and yogurt and ice cream made with whole milk    ¨ Half and half creamer, heavy cream, and whipping cream    ¨ Cheese, cream cheese, and sour cream    · Meats and proteins:      ¨ High-fat cuts of meat (T-bone steak, regular hamburger, and ribs)    ¨ Fried meat, poultry (turkey and chicken), and fish    ¨ Poultry (chicken and turkey) with skin    ¨ Cold cuts (salami or bologna), hot dogs, young, and sausage    ¨ Whole eggs and egg yolks    · Vegetables and fruits with added fat: ¨ Fried vegetables or vegetables in butter or high-fat sauces, such as cream or cheese sauces    ¨ Fried fruit or fruit served with butter or cream    · Fats:      ¨ Butter, stick margarine, and shortening    ¨ Coconut, palm oil, and palm kernel oil  Foods to include:   · Grains:      ¨ Whole-grain breads, cereals, pasta, and brown rice    ¨ Low-fat crackers and pretzels    · Vegetables and fruits:      ¨ Fresh, frozen, or canned vegetables (no salt or low-sodium)    ¨ Fresh, frozen, dried, or canned fruit (canned in light syrup or fruit juice)    ¨ Avocado    · Low-fat dairy products:      ¨ Nonfat (skim) or 1% milk    ¨ Nonfat or low-fat cheese, yogurt, and cottage cheese    · Meats and proteins:      ¨ Chicken or turkey with no skin    ¨ Baked or broiled fish    ¨ Lean beef and pork (loin, round, extra lean hamburger)    ¨ Beans and peas, unsalted nuts, soy products    ¨ Egg whites and substitutes    ¨ Seeds and nuts    · Fats:      ¨ Unsaturated oil, such as canola, olive, peanut, soybean, or sunflower oil    ¨ Soft or liquid margarine and vegetable oil spread    ¨ Low-fat salad dressing  Other ways to decrease fat:   · Read food labels before you buy foods  Choose foods that have less than 30% of calories from fat  Choose low-fat or fat-free dairy products  Remember that fat free does not mean calorie free  These foods still contain calories, and too many calories can lead to weight gain  · Trim fat from meat and avoid fried food  Trim all visible fat from meat before you cook it  Remove the skin from poultry  Do not foster meat, fish, or poultry  Bake, roast, boil, or broil these foods instead  Avoid fried foods  Eat a baked potato instead of Western Kathya fries  Steam vegetables instead of sautéing them in butter  · Add less fat to foods  Use imitation young bits on salads and baked potatoes instead of regular young bits  Use fat-free or low-fat salad dressings instead of regular dressings   Use low-fat or nonfat butter-flavored topping instead of regular butter or margarine on popcorn and other foods  Ways to decrease fat in recipes:  Replace high-fat ingredients with low-fat or nonfat ones  This may cause baked goods to be drier than usual  You may need to use nonfat cooking spray on pans to prevent food from sticking  You also may need to change the amount of other ingredients, such as water, in the recipe  Try the following:  · Use low-fat or light margarine instead of regular margarine or shortening  · Use lean ground turkey breast or chicken, or lean ground beef (less than 5% fat) instead of hamburger  · Add 1 teaspoon of canola oil to 8 ounces of skim milk instead of using cream or half and half  · Use grated zucchini, carrots, or apples in breads instead of coconut  · Use blenderized, low-fat cottage cheese, plain tofu, or low-fat ricotta cheese instead of cream cheese  · Use 1 egg white and 1 teaspoon of canola oil, or use ¼ cup (2 ounces) of fat-free egg substitute instead of a whole egg  · Replace half of the oil that is called for in a recipe with applesauce when you bake  Use 3 tablespoons of cocoa powder and 1 tablespoon of canola oil instead of a square of baking chocolate  How to increase fiber:  Eat enough high-fiber foods to get 20 to 30 grams of fiber every day  Slowly increase your fiber intake to avoid stomach cramps, gas, and other problems  · Eat 3 ounces of whole-grain foods each day  An ounce is about 1 slice of bread  Eat whole-grain breads, such as whole-wheat bread  Whole wheat, whole-wheat flour, or other whole grains should be listed as the first ingredient on the food label  Replace white flour with whole-grain flour or use half of each in recipes  Whole-grain flour is heavier than white flour, so you may have to add more yeast or baking powder  · Eat a high-fiber cereal for breakfast   Oatmeal is a good source of soluble fiber   Look for cereals that have bran or fiber in the name  Choose whole-grain products, such as brown rice, barley, and whole-wheat pasta  · Eat more beans, peas, and lentils  For example, add beans to soups or salads  Eat at least 5 cups of fruits and vegetables each day  Eat fruits and vegetables with the peel because the peel is high in fiber  © 2017 [address]  Information is for End User's use only and may not be sold, redistributed or otherwise used for commercial purposes  All illustrations and images included in CareNotes® are the copyrighted property of [employer]  or Kelvin Hughes  The above information is an  only  It is not intended as medical advice for individual conditions or treatments  Talk to your doctor, nurse or pharmacist before following any medical regimen to see if it is safe and effective for you  Heart Healthy Diet   AMBULATORY CARE:   A heart healthy diet  is an eating plan low in total fat, unhealthy fats, and sodium (salt)  A heart healthy diet helps decrease your risk for heart disease and stroke  Limit the amount of fat you eat to 25% to 35% of your total daily calories  Limit sodium to less than 2,300 mg each day  Healthy fats:  Healthy fats can help improve cholesterol levels  The risk for heart disease is decreased when cholesterol levels are normal  Choose healthy fats, such as the following:  · Unsaturated fat  is found in foods such as soybean, canola, olive, corn, and safflower oils  It is also found in soft tub margarine that is made with liquid vegetable oil  · Omega-3 fat  is found in certain fish, such as salmon, tuna, and trout, and in walnuts and flaxseed  Unhealthy fats:  Unhealthy fats can cause unhealthy cholesterol levels in your blood and increase your risk of heart disease  Limit unhealthy fats, such as the following:  · Cholesterol  is found in animal foods, such as eggs and lobster, and in dairy products made from whole milk   Limit cholesterol to less than 300 milligrams (mg) each day  You may need to limit cholesterol to 200 mg each day if you have heart disease  · Saturated fat  is found in meats, such as young and hamburger  It is also found in chicken or turkey skin, whole milk, and butter  Limit saturated fat to less than 7% of your total daily calories  Limit saturated fat to less than 6% if you have heart disease or are at increased risk for it  · Trans fat  is found in packaged foods, such as potato chips and cookies  It is also in hard margarine, some fried foods, and shortening  Avoid trans fats as much as possible    Heart healthy foods and drinks to include:  Ask your dietitian or healthcare provider how many servings to have from each of the following food groups:  · Grains:      ¨ Whole-wheat breads, cereals, and pastas, and brown rice    ¨ Low-fat, low-sodium crackers and chips    · Vegetables:      ¨ Broccoli, green beans, green peas, and spinach    ¨ Collards, kale, and lima beans    ¨ Carrots, sweet potatoes, tomatoes, and peppers    ¨ Canned vegetables with no salt added    · Fruits:      ¨ Bananas, peaches, pears, and pineapple    ¨ Grapes, raisins, and dates    ¨ Oranges, tangerines, grapefruit, orange juice, and grapefruit juice    ¨ Apricots, mangoes, melons, and papaya    ¨ Raspberries and strawberries    ¨ Canned fruit with no added sugar    · Low-fat dairy products:      ¨ Nonfat (skim) milk, 1% milk, and low-fat almond, cashew, or soy milks fortified with calcium    ¨ Low-fat cheese, regular or frozen yogurt, and cottage cheese    · Meats and proteins , such as lean cuts of beef and pork (loin, leg, round), skinless chicken and turkey, legumes, soy products, egg whites, and nuts  Foods and drinks to limit or avoid:  Ask your dietitian or healthcare provider about these and other foods that are high in unhealthy fat, sodium, and sugar:  · Snack or packaged foods , such as frozen dinners, cookies, macaroni and cheese, and cereals with more than 300 mg of sodium per serving    · Canned or dry mixes  for cakes, soups, sauces, or gravies    · Vegetables with added sodium , such as instant potatoes, vegetables with added sauces, or regular canned vegetables    · Other foods high in sodium , such as ketchup, barbecue sauce, salad dressing, pickles, olives, soy sauce, and miso    · High-fat dairy foods  such as whole or 2% milk, cream cheese, or sour cream, and cheeses     · High-fat protein foods  such as high-fat cuts of beef (T-bone steaks, ribs), chicken or turkey with skin, and organ meats, such as liver    · Cured or smoked meats , such as hot dogs, young, and sausage    · Unhealthy fats and oils , such as butter, stick margarine, shortening, and cooking oils such as coconut or palm oil    · Food and drinks high in sugar , such as soft drinks (soda), sports drinks, sweetened tea, candy, cake, cookies, pies, and doughnuts  Other diet guidelines to follow:   · Eat more foods containing omega-3 fats  Eat fish high in omega-3 fats at least 2 times a week  · Limit alcohol  Too much alcohol can damage your heart and raise your blood pressure  Women should limit alcohol to 1 drink a day  Men should limit alcohol to 2 drinks a day  A drink of alcohol is 12 ounces of beer, 5 ounces of wine, or 1½ ounces of liquor  · Choose low-sodium foods  High-sodium foods can lead to high blood pressure  Add little or no salt to food you prepare  Use herbs and spices in place of salt  · Eat more fiber  to help lower cholesterol levels  Eat at least 5 servings of fruits and vegetables each day  Eat 3 ounces of whole-grain foods each day  Legumes (beans) are also a good source of fiber  Lifestyle guidelines:   · Do not smoke  Nicotine and other chemicals in cigarettes and cigars can cause lung and heart damage  Ask your healthcare provider for information if you currently smoke and need help to quit   E-cigarettes or smokeless tobacco still contain nicotine  Talk to your healthcare provider before you use these products  · Exercise regularly  to help you maintain a healthy weight and improve your blood pressure and cholesterol levels  Ask your healthcare provider about the best exercise plan for you  Do not start an exercise program without asking your healthcare provider  Follow up with your healthcare provider as directed:  Write down your questions so you remember to ask them during your visits  © 2017 [address] [employer] [employer] Information is for End User's use only and may not be sold, redistributed or otherwise used for commercial purposes  All illustrations and images included in CareNotes® are the copyrighted property of [employer]  or [employer]  The above information is an  only  It is not intended as medical advice for individual conditions or treatments  Talk to your doctor, nurse or pharmacist before following any medical regimen to see if it is safe and effective for you  Calorie Counting Diet   WHAT YOU NEED TO KNOW:   What is a calorie counting diet? It is a meal plan based on counting calories each day to reach a healthy body weight  You will need to eat fewer calories if you are trying to lose weight  Weight loss may decrease your risk for certain health problems or improve your health if you have health problems  Some of these health problems include heart disease, high blood pressure, and diabetes  What foods should I avoid? Your dietitian will tell you if you need to avoid certain foods based on your body weight and health condition  You may need to avoid high-fat foods if you are at risk for or have heart disease  You may need to eat fewer foods from the breads and starches food group if you have diabetes  How many calories are in foods? The following is a list of foods and drinks with the approximate number of calories in each   Check the food label to find the exact number of calories  A dietitian can tell you how many calories you should have from each food group each day    · Carbohydrate:      ¨ ½ of a 3-inch bagel, 1 slice of bread, or ½ of a hamburger bun or hot dog bun (80)    ¨ 1 (8-inch) flour tortilla or ½ cup of cooked rice (100)    ¨ 1 (6-inch) corn tortilla (80)    ¨ 1 (6-inch) pancake or 1 cup of bran flakes cereal (110)    ¨ ½ cup of cooked cereal (80)    ¨ ½ cup of cooked pasta (85)    ¨ 1 ounce of pretzels (100)    ¨ 3 cups of air-popped popcorn without butter or oil (80)    · Dairy:      ¨ 1 cup of skim or 1% milk (90)    ¨ 1 cup of 2% milk (120)    ¨ 1 cup of whole milk (160)    ¨ 1 cup of 2% chocolate milk (220)    ¨ 1 ounce of low-fat cheese with 3 grams of fat per ounce (70)    ¨ 1 ounce of cheddar cheese (114)    ¨ ½ cup of 1% fat cottage cheese (80)    ¨ 1 cup of plain or sugar-free, fat-free yogurt (90)    · Protein foods:      ¨ 3 ounces of fish (not breaded or fried) (95)    ¨ 3 ounces of breaded, fried fish (195)    ¨ ¾ cup of tuna canned in water (105)    ¨ 3 ounces of chicken breast without skin (105)    ¨ 1 fried chicken breast with skin (350)    ¨ ¼ cup of fat free egg substitute (40)    ¨ 1 large egg (75)    ¨ 3 ounces of lean beef or pork (165)    ¨ 3 ounces of fried pork chop or ham (185)    ¨ ½ cup of cooked dried beans, such as kidney, velasquez, lentils, or navy (115)    ¨ 3 ounces of bologna or lunch meat (225)    ¨ 2 links of breakfast sausage (140)    · Vegetables:      ¨ ½ cup of sliced mushrooms (10)    ¨ 1 cup of salad greens, such as lettuce, spinach, or lazaro (15)    ¨ ½ cup of steamed asparagus (20)    ¨ ½ cup of cooked summer squash, zucchini squash, or green or wax beans (25)    ¨ 1 cup of broccoli or cauliflower florets, or 1 medium tomato (25)    ¨ 1 large raw carrot or ½ cup of cooked carrots (40)    ¨ ? of a medium cucumber or 1 stalk of celery (5)    ¨ 1 small baked potato (160)    ¨ 1 cup of breaded, fried vegetables (230)    · Fruit:      ¨ 1 (6-inch) banana (55)     ¨ ½ of a 4-inch grapefruit (55)    ¨ 15 grapes (60)    ¨ 1 medium orange or apple (70)    ¨ 1 large peach (65)    ¨ 1 cup of fresh pineapple chunks (75)    ¨ 1 cup of melon cubes (50)    ¨ 1¼ cups of whole strawberries (45)    ¨ ½ cup of fruit canned in juice (55)    ¨ ½ cup of fruit canned in heavy syrup (110)    ¨ ?  cup of raisins (130)    ¨ ½ cup of unsweetened fruit juice (60)    ¨ ½ cup of grape, cranberry, or prune juice (90)    · Fat:      ¨ 10 peanuts or 2 teaspoons of peanut butter (55)    ¨ 2 tablespoons of avocado or 1 tablespoon of regular salad dressing (45)    ¨ 2 slices of young (90)    ¨ 1 teaspoon of oil, such as safflower, canola, corn, or olive oil (45)    ¨ 2 teaspoons of low-fat margarine, or 1 tablespoon of low-fat mayonnaise (50)    ¨ 1 teaspoon of regular margarine (40)    ¨ 1 tablespoon of regular mayonnaise (135)    ¨ 1 tablespoon of cream cheese or 2 tablespoons of low-fat cream cheese (45)    ¨ 2 tablespoons of vegetable shortening (215)    · Dessert and sweets:      ¨ 8 animal crackers or 5 vanilla wafers (80)    ¨ 1 frozen fruit juice bar (80)    ¨ ½ cup of ice milk or low-fat frozen yogurt (90)    ¨ ½ cup of sherbet or sorbet (125)    ¨ ½ cup of sugar-free pudding or custard (60)    ¨ ½ cup of ice cream (140)    ¨ ½ cup of pudding or custard (175)    ¨ 1 (2-inch) square chocolate brownie (185)    · Combination foods:      ¨ Bean burrito made with an 8-inch tortilla, without cheese (275)    ¨ Chicken breast sandwich with lettuce and tomato (325)    ¨ 1 cup of chicken noodle soup (60)    ¨ 1 beef taco (175)    ¨ Regular hamburger with lettuce and tomato (310)    ¨ Regular cheeseburger with lettuce and tomato (410)     ¨ ¼ of a 12-inch cheese pizza (280)    ¨ Fried fish sandwich with lettuce and tomato (425)    ¨ Hot dog and bun (275)    ¨ 1½ cups of macaroni and cheese (310)    ¨ Taco salad with a fried tortilla shell (870)    · Low-calorie foods:      ¨ 1 tablespoon of ketchup or 1 tablespoon of fat free sour cream (15)    ¨ 1 teaspoon of mustard (5)    ¨ ¼ cup of salsa (20)    ¨ 1 large dill pickle (15)    ¨ 1 tablespoon of fat free salad dressing (10)    ¨ 2 teaspoons of low-sugar, light jam or jelly, or 1 tablespoon of sugar-free syrup (15)    ¨ 1 sugar-free popsicle (15)    ¨ 1 cup of club soda, seltzer water, or diet soda (0)  CARE AGREEMENT:   You have the right to help plan your care  Discuss treatment options with your caregivers to decide what care you want to receive  You always have the right to refuse treatment  The above information is an  only  It is not intended as medical advice for individual conditions or treatments  Talk to your doctor, nurse or pharmacist before following any medical regimen to see if it is safe and effective for you  © 2017 [address] [employer] Information is for End User's use only and may not be sold, redistributed or otherwise used for commercial purposes  All illustrations and images included in CareNotes® are the copyrighted property of [employer] , [employer]  or [employer]    Portions of the record may have been created with voice recognition software  Occasional wrong word or "sound a like" substitutions may have occurred due to the inherent limitations of voice recognition software  Read the chart carefully and recognize, using context, where substitutions have occurred  BMI Counseling: Body mass index is 28 06 kg/m²  The BMI is above normal  Nutrition recommendations include reducing portion sizes, decreasing overall calorie intake, 3-5 servings of fruits/vegetables daily, reducing fast food intake, consuming healthier snacks, decreasing soda and/or juice intake, moderation in carbohydrate intake, increasing intake of lean protein, reducing intake of saturated fat and trans fat and reducing intake of cholesterol  Exercise recommendations include moderate aerobic physical activity for 150 minutes/week

## 2020-04-10 DIAGNOSIS — F31.9 BIPOLAR DEPRESSION (HCC): ICD-10-CM

## 2020-04-10 RX ORDER — VENLAFAXINE HYDROCHLORIDE 75 MG/1
75 TABLET, EXTENDED RELEASE ORAL
Qty: 30 TABLET | Refills: 0 | Status: SHIPPED | OUTPATIENT
Start: 2020-04-10 | End: 2020-04-10 | Stop reason: SDUPTHER

## 2020-04-11 RX ORDER — VENLAFAXINE HYDROCHLORIDE 75 MG/1
75 TABLET, EXTENDED RELEASE ORAL
Qty: 30 TABLET | Refills: 3 | Status: SHIPPED | OUTPATIENT
Start: 2020-04-11 | End: 2020-07-15 | Stop reason: SDUPTHER

## 2020-05-09 DIAGNOSIS — F31.9 BIPOLAR DEPRESSION (HCC): ICD-10-CM

## 2020-05-10 DIAGNOSIS — F41.9 ANXIETY: ICD-10-CM

## 2020-05-11 RX ORDER — BUSPIRONE HYDROCHLORIDE 15 MG/1
TABLET ORAL
Qty: 90 TABLET | Refills: 1 | Status: SHIPPED | OUTPATIENT
Start: 2020-05-11 | End: 2020-07-09

## 2020-05-11 RX ORDER — ARIPIPRAZOLE 2 MG/1
TABLET ORAL
Qty: 30 TABLET | Refills: 3 | Status: SHIPPED | OUTPATIENT
Start: 2020-05-11 | End: 2020-08-14 | Stop reason: ALTCHOICE

## 2020-07-09 DIAGNOSIS — F41.9 ANXIETY: ICD-10-CM

## 2020-07-09 RX ORDER — BUSPIRONE HYDROCHLORIDE 15 MG/1
TABLET ORAL
Qty: 90 TABLET | Refills: 1 | Status: SHIPPED | OUTPATIENT
Start: 2020-07-09 | End: 2021-03-05

## 2020-07-15 ENCOUNTER — TELEPHONE (OUTPATIENT)
Dept: FAMILY MEDICINE CLINIC | Facility: CLINIC | Age: 60
End: 2020-07-15

## 2020-07-15 DIAGNOSIS — F31.9 BIPOLAR DEPRESSION (HCC): ICD-10-CM

## 2020-07-15 RX ORDER — VENLAFAXINE HYDROCHLORIDE 37.5 MG/1
37.5 TABLET, EXTENDED RELEASE ORAL
Qty: 30 TABLET | Refills: 0 | Status: SHIPPED | OUTPATIENT
Start: 2020-07-15 | End: 2020-08-17

## 2020-07-15 NOTE — TELEPHONE ENCOUNTER
Spoke with patient and she is aware and ok with doing that   I also made an apt with her in 2 1/2 weeks with you 8/14/2020

## 2020-07-15 NOTE — TELEPHONE ENCOUNTER
She is cutting down the amount of Venlafaxin she is on and is now at 75 mg  She wanted to know if she could stop it soon  Should she refill the RX or could she stop the medication?

## 2020-07-15 NOTE — TELEPHONE ENCOUNTER
I would suggest coming down on 1 medication at a time  After about 2 weeks on the 37 5 mg ok to stop the abilify  Keep the f/u here

## 2020-07-15 NOTE — TELEPHONE ENCOUNTER
Spoke with patient and she is aware, she also would like to stop Abilify 2 mg as well   She said you decreased her at her last visit to 2 mg so now can she just stop taking it

## 2020-08-14 ENCOUNTER — OFFICE VISIT (OUTPATIENT)
Dept: FAMILY MEDICINE CLINIC | Facility: CLINIC | Age: 60
End: 2020-08-14
Payer: COMMERCIAL

## 2020-08-14 VITALS
HEART RATE: 86 BPM | OXYGEN SATURATION: 98 % | SYSTOLIC BLOOD PRESSURE: 122 MMHG | BODY MASS INDEX: 30.48 KG/M2 | DIASTOLIC BLOOD PRESSURE: 72 MMHG | WEIGHT: 172 LBS | HEIGHT: 63 IN | TEMPERATURE: 97.6 F

## 2020-08-14 DIAGNOSIS — F31.9 BIPOLAR DEPRESSION (HCC): Primary | ICD-10-CM

## 2020-08-14 DIAGNOSIS — M72.2 PLANTAR FASCIITIS, BILATERAL: ICD-10-CM

## 2020-08-14 DIAGNOSIS — F41.8 DEPRESSION WITH ANXIETY: ICD-10-CM

## 2020-08-14 PROCEDURE — 1036F TOBACCO NON-USER: CPT | Performed by: FAMILY MEDICINE

## 2020-08-14 PROCEDURE — 99214 OFFICE O/P EST MOD 30 MIN: CPT | Performed by: FAMILY MEDICINE

## 2020-08-14 PROCEDURE — 3008F BODY MASS INDEX DOCD: CPT | Performed by: FAMILY MEDICINE

## 2020-08-14 NOTE — PATIENT INSTRUCTIONS
Discussed all with patient  I am concerned about the weight gain  Read over the information below try to make changes in your life  If you can lose 3 lb on your own and want to come in monthly for to help with weight loss show me you could lose 3 lb and we will schedule you for a follow-up appointment here for consideration of phentermine  Have the labs done fasting but drink water before you go for the test and have the x-rays of the heels done  For now I suggest getting a frozen ice water bottle rolling your foot along that bottle both sides as many times as possible  Let me know if you are not better you may need to see a podiatrist    Stop the Abilify let me know if you have any problems coming off this medication and continue on the venlafaxine  I will call with your labs  Suggest you keep a food diary like My Fitness Pal or the Lose it Perla  Calories should be less than 1200 daily spread out during the course of the day  Cut back on carbs and eat more proteins  Make better food choices  At least 6 to 8 glasses of water daily  Slowly increase the exercise and try to get up to 30 minutes at least 5 times weekly  The changes you make now are for life  Obesity    AMBULATORY CARE:   Obesity  is when your body mass index (BMI) is greater than 30  Your healthcare provider will use your height and weight to measure your BMI  The risks of obesity include  many health problems, such as injuries or physical disability  You may need tests to check for the following:  · Diabetes     · High blood pressure or high cholesterol     · Heart disease     · Gallbladder or liver disease     · Cancer of the colon, breast, prostate, liver, or kidney     · Sleep apnea     · Arthritis or gout  Seek care immediately if:   · You have a severe headache, confusion, or difficulty speaking  · You have weakness on one side of your body  · You have chest pain, sweating, or shortness of breath    Contact your healthcare provider if:   · You have symptoms of gallbladder or liver disease, such as pain in your upper abdomen  · You have knee or hip pain and discomfort while walking  · You have symptoms of diabetes, such as intense hunger and thirst, and frequent urination  · You have symptoms of sleep apnea, such as snoring or daytime sleepiness  · You have questions or concerns about your condition or care  Treatment for obesity  focuses on helping you lose weight to improve your health  Even a small decrease in BMI can reduce the risk for many health problems  Your healthcare provider will help you set a weight-loss goal   · Lifestyle changes  are the first step in treating obesity  These include making healthy food choices and getting regular physical activity  Your healthcare provider may suggest a weight-loss program that involves coaching, education, and therapy  · Medicine  may help you lose weight when it is used with a healthy diet and physical activity  · Surgery  can help you lose weight if you are very obese and have other health problems  There are several types of weight-loss surgery  Ask your healthcare provider for more information  Be successful losing weight:   · Set small, realistic goals  An example of a small goal is to walk for 20 minutes 5 days a week  Anther goal is to lose 5% of your body weight  · Tell friends, family members, and coworkers about your goals  and ask for their support  Ask a friend to lose weight with you, or join a weight-loss support group  · Identify foods or triggers that may cause you to overeat , and find ways to avoid them  Remove tempting high-calorie foods from your home and workplace  Place a bowl of fresh fruit on your kitchen counter  If stress causes you to eat, then find other ways to cope with stress  · Keep a diary to track what you eat and drink  Also write down how many minutes of physical activity you do each day   Weigh yourself once a week and record it in your diary  Eating changes: You will need to eat 500 to 1,000 fewer calories each day than you currently eat to lose 1 to 2 pounds a week  The following changes will help you cut calories:  · Eat smaller portions  Use small plates, no larger than 9 inches in diameter  Fill your plate half full of fruits and vegetables  Measure your food using measuring cups until you know what a serving size looks like  · Eat 3 meals and 1 or 2 snacks each day  Plan your meals in advance  [employer] and eat at home most of the time  Eat slowly  · Eat fruits and vegetables at every meal   They are low in calories and high in fiber, which makes you feel full  Do not add butter, margarine, or cream sauce to vegetables  Use herbs to season steamed vegetables  · Eat less fat and fewer fried foods  Eat more baked or grilled chicken and fish  These protein sources are lower in calories and fat than red meat  Limit fast food  Dress your salads with olive oil and vinegar instead of bottled dressing  · Limit the amount of sugar you eat  Do not drink sugary beverages  Limit alcohol  Activity changes:  Physical activity is good for your body in many ways  It helps you burn calories and build strong muscles  It decreases stress and depression, and improves your mood  It can also help you sleep better  Talk to your healthcare provider before you begin an exercise program   · Exercise for at least 30 minutes 5 days a week  Start slowly  Set aside time each day for physical activity that you enjoy and that is convenient for you  It is best to do both weight training and an activity that increases your heart rate, such as walking, bicycling, or swimming  · Find ways to be more active  Do yard work and housecleaning  Walk up the stairs instead of using elevators  Spend your leisure time going to events that require walking, such as outdoor festivals or fairs   This extra physical activity can help you lose weight and keep it off  Follow up with your healthcare provider as directed: You may need to meet with a dietitian  Write down your questions so you remember to ask them during your visits  © 2017 [address] Information is for End User's use only and may not be sold, redistributed or otherwise used for commercial purposes  All illustrations and images included in CareNotes® are the copyrighted property of [employer]  or Kelvin Hughes  The above information is an  only  It is not intended as medical advice for individual conditions or treatments  Talk to your doctor, nurse or pharmacist before following any medical regimen to see if it is safe and effective for you  Weight Management   AMBULATORY CARE:   Why it is important to manage your weight:  Being overweight increases your risk of health conditions such as heart disease, high blood pressure, type 2 diabetes, and certain types of cancer  It can also increase your risk for osteoarthritis, sleep apnea, and other respiratory problems  Aim for a slow, steady weight loss  Even a small amount of weight loss can lower your risk of health problems  How to lose weight safely:  A safe and healthy way to lose weight is to eat fewer calories and get regular exercise  You can lose up about 1 pound a week by decreasing the number of calories you eat by 500 calories each day  You can decrease calories by eating smaller portion sizes or by cutting out high-calorie foods  Read labels to find out how many calories are in the foods you eat  You can also burn calories with exercise such as walking, swimming, or biking  You will be more likely to keep weight off if you make these changes part of your lifestyle  Healthy meal plan for weight management:  A healthy meal plan includes a variety of foods, contains fewer calories, and helps you stay healthy  A healthy meal plan includes the following:  · Eat whole-grain foods more often    A healthy meal plan should contain fiber  Fiber is the part of grains, fruits, and vegetables that is not broken down by your body  Whole-grain foods are healthy and provide extra fiber in your diet  Some examples of whole-grain foods are whole-wheat breads and pastas, oatmeal, brown rice, and bulgur  · Eat a variety of vegetables every day  Include dark, leafy greens such as spinach, kale, madisyn greens, and mustard greens  Eat yellow and orange vegetables such as carrots, sweet potatoes, and winter squash  · Eat a variety of fruits every day  Choose fresh or canned fruit (canned in its own juice or light syrup) instead of juice  Fruit juice has very little or no fiber  · Eat low-fat dairy foods  Drink fat-free (skim) milk or 1% milk  Eat fat-free yogurt and low-fat cottage cheese  Try low-fat cheeses such as mozzarella and other reduced-fat cheeses  · Choose meat and other protein foods that are low in fat  Choose beans or other legumes such as split peas or lentils  Choose fish, skinless poultry (chicken or turkey), or lean cuts of red meat (beef or pork)  Before you cook meat or poultry, cut off any visible fat  · Use less fat and oil  Try baking foods instead of frying them  Add less fat, such as margarine, sour cream, regular salad dressing and mayonnaise to foods  Eat fewer high-fat foods  Some examples of high-fat foods include french fries, doughnuts, ice cream, and cakes  · Eat fewer sweets  Limit foods and drinks that are high in sugar  This includes candy, cookies, regular soda, and sweetened drinks  Ways to decrease calories:   · Eat smaller portions  ¨ Use a small plate with smaller servings  ¨ Do not eat second helpings  ¨ When you eat at a restaurant, ask for a box and place half of your meal in the box before you eat  ¨ Share an entrée with someone else  · Replace high-calorie snacks with healthy, low-calorie snacks       ¨ Choose fresh fruit, vegetables, fat-free rice cakes, or air-popped popcorn instead of potato chips, nuts, or chocolate  ¨ Choose water or calorie-free drinks instead of soda or sweetened drinks  · Eat regular meals  Skipping meals can lead to overeating later in the day  Eat a healthy snack in place of a meal if you do not have time to eat a regular meal      · Do not shop for groceries when you are hungry  You may be more likely to make unhealthy food choices  Take a grocery list of healthy foods and shop after you have eaten  Exercise:  Exercise at least 30 minutes per day on most days of the week  Some examples of exercise include walking, biking, dancing, and swimming  You can also fit in more physical activity by taking the stairs instead of the elevator or parking farther away from stores  Ask your healthcare provider about the best exercise plan for you  Other things to consider as you try to lose weight:   · Be aware of situations that may give you the urge to overeat, such as eating while watching television  Find ways to avoid these situations  For example, read a book, go for a walk, or do crafts  · Meet with a weight loss support group or friends who are also trying to lose weight  This may help you stay motivated to continue working on your weight loss goals  © 2017 [address] Information is for End User's use only and may not be sold, redistributed or otherwise used for commercial purposes  All illustrations and images included in CareNotes® are the copyrighted property of [employer]  or Kelvin Hughes  The above information is an  only  It is not intended as medical advice for individual conditions or treatments  Talk to your doctor, nurse or pharmacist before following any medical regimen to see if it is safe and effective for you  Low Fat Diet   AMBULATORY CARE:   A low-fat diet  is an eating plan that is low in total fat, unhealthy fat, and cholesterol   You may need to follow a low-fat diet if you have trouble digesting or absorbing fat  You may also need to follow this diet if you have high cholesterol  You can also lower your cholesterol by increasing the amount of fiber in your diet  Soluble fiber is a type of fiber that helps to decrease cholesterol levels  Different types of fat in food:   · Limit unhealthy fats  A diet that is high in cholesterol, saturated fat, and trans fat may cause unhealthy cholesterol levels  Unhealthy cholesterol levels increase your risk of heart disease  ¨ Cholesterol:  Limit intake of cholesterol to less than 200 mg per day  Cholesterol is found in meat, eggs, and dairy  ¨ Saturated fat:  Limit saturated fat to less than 7% of your total daily calories  Ask your dietitian how many calories you need each day  Saturated fat is found in butter, cheese, ice cream, whole milk, and palm oil  Saturated fat is also found in meat, such as beef, pork, chicken skin, and processed meats  Processed meats include sausage, hot dogs, and bologna  ¨ Trans fat:  Avoid trans fat as much as possible  Trans fat is used in fried and baked foods  Foods that say trans fat free on the label may still have up to 0 5 grams of trans fat per serving  · Include healthy fats  Replace foods that are high in saturated and trans fat with foods high in healthy fats  This may help to decrease high cholesterol levels  ¨ Monounsaturated fats: These are found in avocados, nuts, and vegetable oils, such as olive, canola, and sunflower oil  ¨ Polyunsaturated fats: These can be found in vegetable oils, such as soybean or corn oil  Omega-3 fats can help to decrease the risk of heart disease  Omega-3 fats are found in fish, such as salmon, herring, trout, and tuna  Omega-3 fats can also be found in plant foods, such as walnuts, flaxseed, soybeans, and canola oil    Foods to limit or avoid:   · Grains:      ¨ Snacks that are made with partially hydrogenated oils, such as chips, regular crackers, and butter-flavored popcorn    ¨ High-fat baked goods, such as biscuits, croissants, doughnuts, pies, cookies, and pastries    · Dairy:      ¨ Whole milk, 2% milk, and yogurt and ice cream made with whole milk    ¨ Half and half creamer, heavy cream, and whipping cream    ¨ Cheese, cream cheese, and sour cream    · Meats and proteins:      ¨ High-fat cuts of meat (T-bone steak, regular hamburger, and ribs)    ¨ Fried meat, poultry (turkey and chicken), and fish    ¨ Poultry (chicken and turkey) with skin    ¨ Cold cuts (salami or bologna), hot dogs, young, and sausage    ¨ Whole eggs and egg yolks    · Vegetables and fruits with added fat:      ¨ Fried vegetables or vegetables in butter or high-fat sauces, such as cream or cheese sauces    ¨ Fried fruit or fruit served with butter or cream    · Fats:      ¨ Butter, stick margarine, and shortening    ¨ Coconut, palm oil, and palm kernel oil  Foods to include:   · Grains:      ¨ Whole-grain breads, cereals, pasta, and brown rice    ¨ Low-fat crackers and pretzels    · Vegetables and fruits:      ¨ Fresh, frozen, or canned vegetables (no salt or low-sodium)    ¨ Fresh, frozen, dried, or canned fruit (canned in light syrup or fruit juice)    ¨ Avocado    · Low-fat dairy products:      ¨ Nonfat (skim) or 1% milk    ¨ Nonfat or low-fat cheese, yogurt, and cottage cheese    · Meats and proteins:      ¨ Chicken or turkey with no skin    ¨ Baked or broiled fish    ¨ Lean beef and pork (loin, round, extra lean hamburger)    ¨ Beans and peas, unsalted nuts, soy products    ¨ Egg whites and substitutes    ¨ Seeds and nuts    · Fats:      ¨ Unsaturated oil, such as canola, olive, peanut, soybean, or sunflower oil    ¨ Soft or liquid margarine and vegetable oil spread    ¨ Low-fat salad dressing  Other ways to decrease fat:   · Read food labels before you buy foods  Choose foods that have less than 30% of calories from fat   Choose low-fat or fat-free dairy products  Remember that fat free does not mean calorie free  These foods still contain calories, and too many calories can lead to weight gain  · Trim fat from meat and avoid fried food  Trim all visible fat from meat before you cook it  Remove the skin from poultry  Do not foster meat, fish, or poultry  Bake, roast, boil, or broil these foods instead  Avoid fried foods  Eat a baked potato instead of Western Kathya fries  Steam vegetables instead of sautéing them in butter  · Add less fat to foods  Use imitation young bits on salads and baked potatoes instead of regular young bits  Use fat-free or low-fat salad dressings instead of regular dressings  Use low-fat or nonfat butter-flavored topping instead of regular butter or margarine on popcorn and other foods  Ways to decrease fat in recipes:  Replace high-fat ingredients with low-fat or nonfat ones  This may cause baked goods to be drier than usual  You may need to use nonfat cooking spray on pans to prevent food from sticking  You also may need to change the amount of other ingredients, such as water, in the recipe  Try the following:  · Use low-fat or light margarine instead of regular margarine or shortening  · Use lean ground turkey breast or chicken, or lean ground beef (less than 5% fat) instead of hamburger  · Add 1 teaspoon of canola oil to 8 ounces of skim milk instead of using cream or half and half  · Use grated zucchini, carrots, or apples in breads instead of coconut  · Use blenderized, low-fat cottage cheese, plain tofu, or low-fat ricotta cheese instead of cream cheese  · Use 1 egg white and 1 teaspoon of canola oil, or use ¼ cup (2 ounces) of fat-free egg substitute instead of a whole egg  · Replace half of the oil that is called for in a recipe with applesauce when you bake  Use 3 tablespoons of cocoa powder and 1 tablespoon of canola oil instead of a square of baking chocolate    How to increase fiber:  Eat enough high-fiber foods to get 20 to 30 grams of fiber every day  Slowly increase your fiber intake to avoid stomach cramps, gas, and other problems  · Eat 3 ounces of whole-grain foods each day  An ounce is about 1 slice of bread  Eat whole-grain breads, such as whole-wheat bread  Whole wheat, whole-wheat flour, or other whole grains should be listed as the first ingredient on the food label  Replace white flour with whole-grain flour or use half of each in recipes  Whole-grain flour is heavier than white flour, so you may have to add more yeast or baking powder  · Eat a high-fiber cereal for breakfast   Oatmeal is a good source of soluble fiber  Look for cereals that have bran or fiber in the name  Choose whole-grain products, such as brown rice, barley, and whole-wheat pasta  · Eat more beans, peas, and lentils  For example, add beans to soups or salads  Eat at least 5 cups of fruits and vegetables each day  Eat fruits and vegetables with the peel because the peel is high in fiber  © 2017 [address] [facility] Information is for End User's use only and may not be sold, redistributed or otherwise used for commercial purposes  All illustrations and images included in CareNotes® are the copyrighted property of [employer]  or Kelvin Hughes  The above information is an  only  It is not intended as medical advice for individual conditions or treatments  Talk to your doctor, nurse or pharmacist before following any medical regimen to see if it is safe and effective for you  Heart Healthy Diet   AMBULATORY CARE:   A heart healthy diet  is an eating plan low in total fat, unhealthy fats, and sodium (salt)  A heart healthy diet helps decrease your risk for heart disease and stroke  Limit the amount of fat you eat to 25% to 35% of your total daily calories  Limit sodium to less than 2,300 mg each day  Healthy fats:  Healthy fats can help improve cholesterol levels   The risk for heart disease is decreased when cholesterol levels are normal  Choose healthy fats, such as the following:  · Unsaturated fat  is found in foods such as soybean, canola, olive, corn, and safflower oils  It is also found in soft tub margarine that is made with liquid vegetable oil  · Omega-3 fat  is found in certain fish, such as salmon, tuna, and trout, and in walnuts and flaxseed  Unhealthy fats:  Unhealthy fats can cause unhealthy cholesterol levels in your blood and increase your risk of heart disease  Limit unhealthy fats, such as the following:  · Cholesterol  is found in animal foods, such as eggs and lobster, and in dairy products made from whole milk  Limit cholesterol to less than 300 milligrams (mg) each day  You may need to limit cholesterol to 200 mg each day if you have heart disease  · Saturated fat  is found in meats, such as young and hamburger  It is also found in chicken or turkey skin, whole milk, and butter  Limit saturated fat to less than 7% of your total daily calories  Limit saturated fat to less than 6% if you have heart disease or are at increased risk for it  · Trans fat  is found in packaged foods, such as potato chips and cookies  It is also in hard margarine, some fried foods, and shortening  Avoid trans fats as much as possible    Heart healthy foods and drinks to include:  Ask your dietitian or healthcare provider how many servings to have from each of the following food groups:  · Grains:      ¨ Whole-wheat breads, cereals, and pastas, and brown rice    ¨ Low-fat, low-sodium crackers and chips    · Vegetables:      ¨ Broccoli, green beans, green peas, and spinach    ¨ Collards, kale, and lima beans    ¨ Carrots, sweet potatoes, tomatoes, and peppers    ¨ Canned vegetables with no salt added    · Fruits:      ¨ Bananas, peaches, pears, and pineapple    ¨ Grapes, raisins, and dates    ¨ Oranges, tangerines, grapefruit, orange juice, and grapefruit juice    ¨ Apricots, mangoes, melons, and papaya    ¨ Raspberries and strawberries    ¨ Canned fruit with no added sugar    · Low-fat dairy products:      ¨ Nonfat (skim) milk, 1% milk, and low-fat almond, cashew, or soy milks fortified with calcium    ¨ Low-fat cheese, regular or frozen yogurt, and cottage cheese    · Meats and proteins , such as lean cuts of beef and pork (loin, leg, round), skinless chicken and turkey, legumes, soy products, egg whites, and nuts  Foods and drinks to limit or avoid:  Ask your dietitian or healthcare provider about these and other foods that are high in unhealthy fat, sodium, and sugar:  · Snack or packaged foods , such as frozen dinners, cookies, macaroni and cheese, and cereals with more than 300 mg of sodium per serving    · Canned or dry mixes  for cakes, soups, sauces, or gravies    · Vegetables with added sodium , such as instant potatoes, vegetables with added sauces, or regular canned vegetables    · Other foods high in sodium , such as ketchup, barbecue sauce, salad dressing, pickles, olives, soy sauce, and miso    · High-fat dairy foods  such as whole or 2% milk, cream cheese, or sour cream, and cheeses     · High-fat protein foods  such as high-fat cuts of beef (T-bone steaks, ribs), chicken or turkey with skin, and organ meats, such as liver    · Cured or smoked meats , such as hot dogs, young, and sausage    · Unhealthy fats and oils , such as butter, stick margarine, shortening, and cooking oils such as coconut or palm oil    · Food and drinks high in sugar , such as soft drinks (soda), sports drinks, sweetened tea, candy, cake, cookies, pies, and doughnuts  Other diet guidelines to follow:   · Eat more foods containing omega-3 fats  Eat fish high in omega-3 fats at least 2 times a week  · Limit alcohol  Too much alcohol can damage your heart and raise your blood pressure  Women should limit alcohol to 1 drink a day  Men should limit alcohol to 2 drinks a day   A drink of alcohol is 12 ounces of beer, 5 ounces of wine, or 1½ ounces of liquor  · Choose low-sodium foods  High-sodium foods can lead to high blood pressure  Add little or no salt to food you prepare  Use herbs and spices in place of salt  · Eat more fiber  to help lower cholesterol levels  Eat at least 5 servings of fruits and vegetables each day  Eat 3 ounces of whole-grain foods each day  Legumes (beans) are also a good source of fiber  Lifestyle guidelines:   · Do not smoke  Nicotine and other chemicals in cigarettes and cigars can cause lung and heart damage  Ask your healthcare provider for information if you currently smoke and need help to quit  E-cigarettes or smokeless tobacco still contain nicotine  Talk to your healthcare provider before you use these products  · Exercise regularly  to help you maintain a healthy weight and improve your blood pressure and cholesterol levels  Ask your healthcare provider about the best exercise plan for you  Do not start an exercise program without asking your healthcare provider  Follow up with your healthcare provider as directed:  Write down your questions so you remember to ask them during your visits  © 2017 [address] [facility] Information is for End User's use only and may not be sold, redistributed or otherwise used for commercial purposes  All illustrations and images included in CareNotes® are the copyrighted property of [employer]  or Kelvin [employer]  The above information is an  only  It is not intended as medical advice for individual conditions or treatments  Talk to your doctor, nurse or pharmacist before following any medical regimen to see if it is safe and effective for you  Calorie Counting Diet   WHAT YOU NEED TO KNOW:   What is a calorie counting diet? It is a meal plan based on counting calories each day to reach a healthy body weight  You will need to eat fewer calories if you are trying to lose weight   Weight loss may decrease your risk for certain health problems or improve your health if you have health problems  Some of these health problems include heart disease, high blood pressure, and diabetes  What foods should I avoid? Your dietitian will tell you if you need to avoid certain foods based on your body weight and health condition  You may need to avoid high-fat foods if you are at risk for or have heart disease  You may need to eat fewer foods from the breads and starches food group if you have diabetes  How many calories are in foods? The following is a list of foods and drinks with the approximate number of calories in each  Check the food label to find the exact number of calories  A dietitian can tell you how many calories you should have from each food group each day    · Carbohydrate:      ¨ ½ of a 3-inch bagel, 1 slice of bread, or ½ of a hamburger bun or hot dog bun (80)    ¨ 1 (8-inch) flour tortilla or ½ cup of cooked rice (100)    ¨ 1 (6-inch) corn tortilla (80)    ¨ 1 (6-inch) pancake or 1 cup of bran flakes cereal (110)    ¨ ½ cup of cooked cereal (80)    ¨ ½ cup of cooked pasta (85)    ¨ 1 ounce of pretzels (100)    ¨ 3 cups of air-popped popcorn without butter or oil (80)    · Dairy:      ¨ 1 cup of skim or 1% milk (90)    ¨ 1 cup of 2% milk (120)    ¨ 1 cup of whole milk (160)    ¨ 1 cup of 2% chocolate milk (220)    ¨ 1 ounce of low-fat cheese with 3 grams of fat per ounce (70)    ¨ 1 ounce of cheddar cheese (114)    ¨ ½ cup of 1% fat cottage cheese (80)    ¨ 1 cup of plain or sugar-free, fat-free yogurt (90)    · Protein foods:      ¨ 3 ounces of fish (not breaded or fried) (95)    ¨ 3 ounces of breaded, fried fish (195)    ¨ ¾ cup of tuna canned in water (105)    ¨ 3 ounces of chicken breast without skin (105)    ¨ 1 fried chicken breast with skin (350)    ¨ ¼ cup of fat free egg substitute (40)    ¨ 1 large egg (75)    ¨ 3 ounces of lean beef or pork (165)    ¨ 3 ounces of fried pork chop or ham (185)    ¨ ½ cup of cooked dried beans, such as kidney, velasquez, lentils, or navy (115)    ¨ 3 ounces of bologna or lunch meat (225)    ¨ 2 links of breakfast sausage (140)    · Vegetables:      ¨ ½ cup of sliced mushrooms (10)    ¨ 1 cup of salad greens, such as lettuce, spinach, or lazaro (15)    ¨ ½ cup of steamed asparagus (20)    ¨ ½ cup of cooked summer squash, zucchini squash, or green or wax beans (25)    ¨ 1 cup of broccoli or cauliflower florets, or 1 medium tomato (25)    ¨ 1 large raw carrot or ½ cup of cooked carrots (40)    ¨ ? of a medium cucumber or 1 stalk of celery (5)    ¨ 1 small baked potato (160)    ¨ 1 cup of breaded, fried vegetables (230)    · Fruit:      ¨ 1 (6-inch) banana (55)     ¨ ½ of a 4-inch grapefruit (55)    ¨ 15 grapes (60)    ¨ 1 medium orange or apple (70)    ¨ 1 large peach (65)    ¨ 1 cup of fresh pineapple chunks (75)    ¨ 1 cup of melon cubes (50)    ¨ 1¼ cups of whole strawberries (45)    ¨ ½ cup of fruit canned in juice (55)    ¨ ½ cup of fruit canned in heavy syrup (110)    ¨ ?  cup of raisins (130)    ¨ ½ cup of unsweetened fruit juice (60)    ¨ ½ cup of grape, cranberry, or prune juice (90)    · Fat:      ¨ 10 peanuts or 2 teaspoons of peanut butter (55)    ¨ 2 tablespoons of avocado or 1 tablespoon of regular salad dressing (45)    ¨ 2 slices of young (90)    ¨ 1 teaspoon of oil, such as safflower, canola, corn, or olive oil (45)    ¨ 2 teaspoons of low-fat margarine, or 1 tablespoon of low-fat mayonnaise (50)    ¨ 1 teaspoon of regular margarine (40)    ¨ 1 tablespoon of regular mayonnaise (135)    ¨ 1 tablespoon of cream cheese or 2 tablespoons of low-fat cream cheese (45)    ¨ 2 tablespoons of vegetable shortening (215)    · Dessert and sweets:      ¨ 8 animal crackers or 5 vanilla wafers (80)    ¨ 1 frozen fruit juice bar (80)    ¨ ½ cup of ice milk or low-fat frozen yogurt (90)    ¨ ½ cup of sherbet or sorbet (125)    ¨ ½ cup of sugar-free pudding or custard (60)    ¨ ½ cup of ice cream (140)    ¨ ½ cup of pudding or custard (175)    ¨ 1 (2-inch) square chocolate brownie (185)    · Combination foods:      ¨ Bean burrito made with an 8-inch tortilla, without cheese (275)    ¨ Chicken breast sandwich with lettuce and tomato (325)    ¨ 1 cup of chicken noodle soup (60)    ¨ 1 beef taco (175)    ¨ Regular hamburger with lettuce and tomato (310)    ¨ Regular cheeseburger with lettuce and tomato (410)     ¨ ¼ of a 12-inch cheese pizza (280)    ¨ Fried fish sandwich with lettuce and tomato (425)    ¨ Hot dog and bun (275)    ¨ 1½ cups of macaroni and cheese (310)    ¨ Taco salad with a fried tortilla shell (870)    · Low-calorie foods:      ¨ 1 tablespoon of ketchup or 1 tablespoon of fat free sour cream (15)    ¨ 1 teaspoon of mustard (5)    ¨ ¼ cup of salsa (20)    ¨ 1 large dill pickle (15)    ¨ 1 tablespoon of fat free salad dressing (10)    ¨ 2 teaspoons of low-sugar, light jam or jelly, or 1 tablespoon of sugar-free syrup (15)    ¨ 1 sugar-free popsicle (15)    ¨ 1 cup of club soda, seltzer water, or diet soda (0)  CARE AGREEMENT:   You have the right to help plan your care  Discuss treatment options with your caregivers to decide what care you want to receive  You always have the right to refuse treatment  The above information is an  only  It is not intended as medical advice for individual conditions or treatments  Talk to your doctor, nurse or pharmacist before following any medical regimen to see if it is safe and effective for you  © 2017 [address] [employer] Information is for End User's use only and may not be sold, redistributed or otherwise used for commercial purposes  All illustrations and images included in CareNotes® are the copyrighted property of [employer]  or Kelvin Hughes

## 2020-08-14 NOTE — PROGRESS NOTES
Assessment/Plan:     Chronic Problems:  Bipolar depression ([facility] )  Pt has done very well  Will d/c abilify and f/u in 3 months  Depression with anxiety  Stay on the low dose venlafaxine  Visit Diagnosis:  Diagnoses and all orders for this visit:    Bipolar depression ([facility] )    Depression with anxiety    BMI 30 0-30 9,adult  Comments:  Given info for weight loss  Plantar fasciitis, bilateral  Comments:  Given exercises with frozen ice water bottle and advised to roll her feet over this as much as possible  Have the xray done I will call with labs  Orders:  -     XR heel / calcaneus 2+ vw left; Future  -     XR heel / calcaneus 2+ vw right; Future          Subjective:    Patient ID: Arian Belcher is a 64 y o  female  Pt is here for routine f/u appt  Notices that she really doesn't like to talk to people too much  Feels she can't concentrate and feels like she was always like that  No anxiety or depression  Wants to go down on the abilify as she feels she is gaining weight  Gained 14 lbs since the last visit  She knows she is eating more and hungry all the time  Last menses was July 2019  Bottom of her feet are killing her over the heels  No h/o trauma  Both heels hurt her  Takes all other meds as directed  No side effects noted  The following portions of the patient's history were reviewed and updated as appropriate: allergies, current medications, past family history, past medical history, past social history, past surgical history and problem list     Review of Systems   Constitutional: Positive for diaphoresis  Negative for chills, fatigue and fever  HENT: Negative  Eyes: Negative  Respiratory: Negative for cough, shortness of breath and wheezing  Cardiovascular: Negative for chest pain and palpitations  Gastrointestinal: Negative for abdominal pain, constipation, diarrhea, nausea and vomiting  Endocrine: Positive for heat intolerance  Genitourinary: Negative  Musculoskeletal: Positive for arthralgias (Pain in both heels  )  Negative for myalgias  Neurological: Negative for dizziness, light-headedness and headaches  Psychiatric/Behavioral: Negative for dysphoric mood  The patient is not nervous/anxious  Feels her mood is so much better  /72   Pulse 86   Temp 97 6 °F (36 4 °C)   Ht 5' 3" (1 6 m)   Wt 78 kg (172 lb)   SpO2 98%   BMI 30 47 kg/m²   Social History     Socioeconomic History    Marital status: /Civil Union     Spouse name: Not on file    Number of children: Not on file    Years of education: Not on file    Highest education level: Not on file   Occupational History    Occupation: employed   Social Needs    Financial resource strain: Not on file    Food insecurity     Worry: Not on file     Inability: Not on file   [employer] needs     Medical: Not on file     Non-medical: Not on file   Tobacco Use    Smoking status: Former Smoker    Smokeless tobacco: Never Used   Substance and Sexual Activity    Alcohol use: Yes     Comment: social    Drug use: No    Sexual activity: Not on file   Lifestyle    Physical activity     Days per week: Not on file     Minutes per session: Not on file    Stress: Not on file   Relationships    Social connections     Talks on phone: Not on file     Gets together: Not on file     Attends Bahai service: Not on file     Active member of club or organization: Not on file     Attends meetings of clubs or organizations: Not on file     Relationship status: Not on file    Intimate partner violence     Fear of current or ex partner: Not on file     Emotionally abused: Not on file     Physically abused: Not on file     Forced sexual activity: Not on file   Other Topics Concern    Not on file   Social History Narrative    Always uses seatbelt    Lives with spouse    No caffeine use     History reviewed  No pertinent past medical history    Family History   Problem Relation Age of Onset    Heart attack Mother         MI    Heart attack Father         MI    Migraines Sister     Heart attack Brother         MI     History reviewed  No pertinent surgical history  Current Outpatient Medications:     busPIRone (BUSPAR) 15 mg tablet, TAKE 1 TABLET BY MOUTH 3 TIMES A DAY AS NEEDED FOR ANXIETY, Disp: 90 tablet, Rfl: 1    mometasone (ELOCON) 0 1 % cream, Apply topically daily To eyelids only  , Disp: 45 g, Rfl: 0    SUMAtriptan (IMITREX) 50 mg tablet, Take by mouth, Disp: , Rfl:     venlafaxine 37 5 mg 24 hr tablet, Take 1 tablet (37 5 mg total) by mouth daily with breakfast, Disp: 30 tablet, Rfl: 0    Allergies   Allergen Reactions    Meloxicam Throat Swelling    Skelaxin [Metaxalone] Throat Swelling          Lab Review   No visits with results within 2 Month(s) from this visit  Latest known visit with results is:   Appointment on 07/26/2019   Component Date Value    Urine Culture 07/26/2019 10,000-19,000 cfu/ml Escherichia coli*        Imaging: No results found  Objective:     Physical Exam  Constitutional:       General: She is not in acute distress  Appearance: She is well-developed  She is obese  She is not ill-appearing, toxic-appearing or diaphoretic  HENT:      Head: Normocephalic and atraumatic  Right Ear: Tympanic membrane, ear canal and external ear normal       Left Ear: Tympanic membrane, ear canal and external ear normal       Nose: Nose normal  No congestion  Mouth/Throat:      Mouth: Mucous membranes are moist       Pharynx: No oropharyngeal exudate  Eyes:      General:         Right eye: No discharge  Left eye: No discharge  Conjunctiva/sclera: Conjunctivae normal       Pupils: Pupils are equal, round, and reactive to light  Neck:      Musculoskeletal: Normal range of motion and neck supple  Thyroid: No thyromegaly  Cardiovascular:      Rate and Rhythm: Normal rate and regular rhythm  Heart sounds: Normal heart sounds   No murmur  No friction rub  Pulmonary:      Effort: Pulmonary effort is normal  No respiratory distress  Breath sounds: Normal breath sounds  No wheezing or rales  Chest:      Chest wall: No tenderness  Abdominal:      General: Bowel sounds are normal       Palpations: Abdomen is soft  Tenderness: There is no abdominal tenderness  Musculoskeletal: Normal range of motion  General: No tenderness or deformity  Comments: No tenderness to either heel  Lymphadenopathy:      Cervical: No cervical adenopathy  Skin:     General: Skin is warm and dry  Findings: No rash  Neurological:      General: No focal deficit present  Mental Status: She is alert and oriented to person, place, and time  Cranial Nerves: No cranial nerve deficit  Psychiatric:         Mood and Affect: Mood normal          Behavior: Behavior normal          Thought Content: Thought content normal          Judgment: Judgment normal            Patient Instructions   Discussed all with patient  I am concerned about the weight gain  Read over the information below try to make changes in your life  If you can lose 3 lb on your own and want to come in monthly for to help with weight loss show me you could lose 3 lb and we will schedule you for a follow-up appointment here for consideration of phentermine  Have the labs done fasting but drink water before you go for the test and have the x-rays of the heels done  For now I suggest getting a frozen ice water bottle rolling your foot along that bottle both sides as many times as possible  Let me know if you are not better you may need to see a podiatrist    Stop the Abilify let me know if you have any problems coming off this medication and continue on the venlafaxine  I will call with your labs  Suggest you keep a food diary like My Fitness Pal or the Lose it Perla  Calories should be less than 1200 daily spread out during the course of the day   Cut back on carbs and eat more proteins  Make better food choices  At least 6 to 8 glasses of water daily  Slowly increase the exercise and try to get up to 30 minutes at least 5 times weekly  The changes you make now are for life  Obesity    AMBULATORY CARE:   Obesity  is when your body mass index (BMI) is greater than 30  Your healthcare provider will use your height and weight to measure your BMI  The risks of obesity include  many health problems, such as injuries or physical disability  You may need tests to check for the following:  · Diabetes     · High blood pressure or high cholesterol     · Heart disease     · Gallbladder or liver disease     · Cancer of the colon, breast, prostate, liver, or kidney     · Sleep apnea     · Arthritis or gout  Seek care immediately if:   · You have a severe headache, confusion, or difficulty speaking  · You have weakness on one side of your body  · You have chest pain, sweating, or shortness of breath  Contact your healthcare provider if:   · You have symptoms of gallbladder or liver disease, such as pain in your upper abdomen  · You have knee or hip pain and discomfort while walking  · You have symptoms of diabetes, such as intense hunger and thirst, and frequent urination  · You have symptoms of sleep apnea, such as snoring or daytime sleepiness  · You have questions or concerns about your condition or care  Treatment for obesity  focuses on helping you lose weight to improve your health  Even a small decrease in BMI can reduce the risk for many health problems  Your healthcare provider will help you set a weight-loss goal   · Lifestyle changes  are the first step in treating obesity  These include making healthy food choices and getting regular physical activity  Your healthcare provider may suggest a weight-loss program that involves coaching, education, and therapy       · Medicine  may help you lose weight when it is used with a healthy diet and physical activity  · Surgery  can help you lose weight if you are very obese and have other health problems  There are several types of weight-loss surgery  Ask your healthcare provider for more information  Be successful losing weight:   · Set small, realistic goals  An example of a small goal is to walk for 20 minutes 5 days a week  Anther goal is to lose 5% of your body weight  · Tell friends, family members, and coworkers about your goals  and ask for their support  Ask a friend to lose weight with you, or join a weight-loss support group  · Identify foods or triggers that may cause you to overeat , and find ways to avoid them  Remove tempting high-calorie foods from your home and workplace  Place a bowl of fresh fruit on your kitchen counter  If stress causes you to eat, then find other ways to cope with stress  · Keep a diary to track what you eat and drink  Also write down how many minutes of physical activity you do each day  Weigh yourself once a week and record it in your diary  Eating changes: You will need to eat 500 to 1,000 fewer calories each day than you currently eat to lose 1 to 2 pounds a week  The following changes will help you cut calories:  · Eat smaller portions  Use small plates, no larger than 9 inches in diameter  Fill your plate half full of fruits and vegetables  Measure your food using measuring cups until you know what a serving size looks like  · Eat 3 meals and 1 or 2 snacks each day  Plan your meals in advance  Terryl [place] and eat at home most of the time  Eat slowly  · Eat fruits and vegetables at every meal   They are low in calories and high in fiber, which makes you feel full  Do not add butter, margarine, or cream sauce to vegetables  Use herbs to season steamed vegetables  · Eat less fat and fewer fried foods  Eat more baked or grilled chicken and fish  These protein sources are lower in calories and fat than red meat  Limit fast food   Dress your salads with olive oil and vinegar instead of bottled dressing  · Limit the amount of sugar you eat  Do not drink sugary beverages  Limit alcohol  Activity changes:  Physical activity is good for your body in many ways  It helps you burn calories and build strong muscles  It decreases stress and depression, and improves your mood  It can also help you sleep better  Talk to your healthcare provider before you begin an exercise program   · Exercise for at least 30 minutes 5 days a week  Start slowly  Set aside time each day for physical activity that you enjoy and that is convenient for you  It is best to do both weight training and an activity that increases your heart rate, such as walking, bicycling, or swimming  · Find ways to be more active  Do yard work and housecleaning  Walk up the stairs instead of using elevators  Spend your leisure time going to events that require walking, such as outdoor festivals or fairs  This extra physical activity can help you lose weight and keep it off  Follow up with your healthcare provider as directed: You may need to meet with a dietitian  Write down your questions so you remember to ask them during your visits  © 2017 [address] [facility] Information is for End User's use only and may not be sold, redistributed or otherwise used for commercial purposes  All illustrations and images included in CareNotes® are the copyrighted property of A D A M [employer] Inc  or Kelvin Hughes  The above information is an  only  It is not intended as medical advice for individual conditions or treatments  Talk to your doctor, nurse or pharmacist before following any medical regimen to see if it is safe and effective for you  Weight Management   AMBULATORY CARE:   Why it is important to manage your weight:  Being overweight increases your risk of health conditions such as heart disease, high blood pressure, type 2 diabetes, and certain types of cancer   It can also increase your risk for osteoarthritis, sleep apnea, and other respiratory problems  Aim for a slow, steady weight loss  Even a small amount of weight loss can lower your risk of health problems  How to lose weight safely:  A safe and healthy way to lose weight is to eat fewer calories and get regular exercise  You can lose up about 1 pound a week by decreasing the number of calories you eat by 500 calories each day  You can decrease calories by eating smaller portion sizes or by cutting out high-calorie foods  Read labels to find out how many calories are in the foods you eat  You can also burn calories with exercise such as walking, swimming, or biking  You will be more likely to keep weight off if you make these changes part of your lifestyle  Healthy meal plan for weight management:  A healthy meal plan includes a variety of foods, contains fewer calories, and helps you stay healthy  A healthy meal plan includes the following:  · Eat whole-grain foods more often  A healthy meal plan should contain fiber  Fiber is the part of grains, fruits, and vegetables that is not broken down by your body  Whole-grain foods are healthy and provide extra fiber in your diet  Some examples of whole-grain foods are whole-wheat breads and pastas, oatmeal, brown rice, and bulgur  · Eat a variety of vegetables every day  Include dark, leafy greens such as spinach, kale, madisyn greens, and mustard greens  Eat yellow and orange vegetables such as carrots, sweet potatoes, and winter squash  · Eat a variety of fruits every day  Choose fresh or canned fruit (canned in its own juice or light syrup) instead of juice  Fruit juice has very little or no fiber  · Eat low-fat dairy foods  Drink fat-free (skim) milk or 1% milk  Eat fat-free yogurt and low-fat cottage cheese  Try low-fat cheeses such as mozzarella and other reduced-fat cheeses  · Choose meat and other protein foods that are low in fat    Choose beans or other legumes such as split peas or lentils  Choose fish, skinless poultry (chicken or turkey), or lean cuts of red meat (beef or pork)  Before you cook meat or poultry, cut off any visible fat  · Use less fat and oil  Try baking foods instead of frying them  Add less fat, such as margarine, sour cream, regular salad dressing and mayonnaise to foods  Eat fewer high-fat foods  Some examples of high-fat foods include french fries, doughnuts, ice cream, and cakes  · Eat fewer sweets  Limit foods and drinks that are high in sugar  This includes candy, cookies, regular soda, and sweetened drinks  Ways to decrease calories:   · Eat smaller portions  ¨ Use a small plate with smaller servings  ¨ Do not eat second helpings  ¨ When you eat at a restaurant, ask for a box and place half of your meal in the box before you eat  ¨ Share an entrée with someone else  · Replace high-calorie snacks with healthy, low-calorie snacks  ¨ Choose fresh fruit, vegetables, fat-free rice cakes, or air-popped popcorn instead of potato chips, nuts, or chocolate  ¨ Choose water or calorie-free drinks instead of soda or sweetened drinks  · Eat regular meals  Skipping meals can lead to overeating later in the day  Eat a healthy snack in place of a meal if you do not have time to eat a regular meal      · Do not shop for groceries when you are hungry  You may be more likely to make unhealthy food choices  Take a grocery list of healthy foods and shop after you have eaten  Exercise:  Exercise at least 30 minutes per day on most days of the week  Some examples of exercise include walking, biking, dancing, and swimming  You can also fit in more physical activity by taking the stairs instead of the elevator or parking farther away from stores  Ask your healthcare provider about the best exercise plan for you     Other things to consider as you try to lose weight:   · Be aware of situations that may give you the urge to overeat, such as eating while watching television  Find ways to avoid these situations  For example, read a book, go for a walk, or do crafts  · Meet with a weight loss support group or friends who are also trying to lose weight  This may help you stay motivated to continue working on your weight loss goals  © 2017 [address] [employer] [employer] Information is for End User's use only and may not be sold, redistributed or otherwise used for commercial purposes  All illustrations and images included in CareNotes® are the copyrighted property of [employer]  or Kelvin Hughes  The above information is an  only  It is not intended as medical advice for individual conditions or treatments  Talk to your doctor, nurse or pharmacist before following any medical regimen to see if it is safe and effective for you  Low Fat Diet   AMBULATORY CARE:   A low-fat diet  is an eating plan that is low in total fat, unhealthy fat, and cholesterol  You may need to follow a low-fat diet if you have trouble digesting or absorbing fat  You may also need to follow this diet if you have high cholesterol  You can also lower your cholesterol by increasing the amount of fiber in your diet  Soluble fiber is a type of fiber that helps to decrease cholesterol levels  Different types of fat in food:   · Limit unhealthy fats  A diet that is high in cholesterol, saturated fat, and trans fat may cause unhealthy cholesterol levels  Unhealthy cholesterol levels increase your risk of heart disease  ¨ Cholesterol:  Limit intake of cholesterol to less than 200 mg per day  Cholesterol is found in meat, eggs, and dairy  ¨ Saturated fat:  Limit saturated fat to less than 7% of your total daily calories  Ask your dietitian how many calories you need each day  Saturated fat is found in butter, cheese, ice cream, whole milk, and palm oil   Saturated fat is also found in meat, such as beef, pork, chicken skin, and processed meats  Processed meats include sausage, hot dogs, and bologna  ¨ Trans fat:  Avoid trans fat as much as possible  Trans fat is used in fried and baked foods  Foods that say trans fat free on the label may still have up to 0 5 grams of trans fat per serving  · Include healthy fats  Replace foods that are high in saturated and trans fat with foods high in healthy fats  This may help to decrease high cholesterol levels  ¨ Monounsaturated fats: These are found in avocados, nuts, and vegetable oils, such as olive, canola, and sunflower oil  ¨ Polyunsaturated fats: These can be found in vegetable oils, such as soybean or corn oil  Omega-3 fats can help to decrease the risk of heart disease  Omega-3 fats are found in fish, such as salmon, herring, trout, and tuna  Omega-3 fats can also be found in plant foods, such as walnuts, flaxseed, soybeans, and canola oil    Foods to limit or avoid:   · Grains:      ¨ Snacks that are made with partially hydrogenated oils, such as chips, regular crackers, and butter-flavored popcorn    ¨ High-fat baked goods, such as biscuits, croissants, doughnuts, pies, cookies, and pastries    · Dairy:      ¨ Whole milk, 2% milk, and yogurt and ice cream made with whole milk    ¨ Half and half creamer, heavy cream, and whipping cream    ¨ Cheese, cream cheese, and sour cream    · Meats and proteins:      ¨ High-fat cuts of meat (T-bone steak, regular hamburger, and ribs)    ¨ Fried meat, poultry (turkey and chicken), and fish    ¨ Poultry (chicken and turkey) with skin    ¨ Cold cuts (salami or bologna), hot dogs, young, and sausage    ¨ Whole eggs and egg yolks    · Vegetables and fruits with added fat:      ¨ Fried vegetables or vegetables in butter or high-fat sauces, such as cream or cheese sauces    ¨ Fried fruit or fruit served with butter or cream    · Fats:      ¨ Butter, stick margarine, and shortening    ¨ Coconut, palm oil, and palm kernel oil  Foods to include: · Grains:      ¨ Whole-grain breads, cereals, pasta, and brown rice    ¨ Low-fat crackers and pretzels    · Vegetables and fruits:      ¨ Fresh, frozen, or canned vegetables (no salt or low-sodium)    ¨ Fresh, frozen, dried, or canned fruit (canned in light syrup or fruit juice)    ¨ Avocado    · Low-fat dairy products:      ¨ Nonfat (skim) or 1% milk    ¨ Nonfat or low-fat cheese, yogurt, and cottage cheese    · Meats and proteins:      ¨ Chicken or turkey with no skin    ¨ Baked or broiled fish    ¨ Lean beef and pork (loin, round, extra lean hamburger)    ¨ Beans and peas, unsalted nuts, soy products    ¨ Egg whites and substitutes    ¨ Seeds and nuts    · Fats:      ¨ Unsaturated oil, such as canola, olive, peanut, soybean, or sunflower oil    ¨ Soft or liquid margarine and vegetable oil spread    ¨ Low-fat salad dressing  Other ways to decrease fat:   · Read food labels before you buy foods  Choose foods that have less than 30% of calories from fat  Choose low-fat or fat-free dairy products  Remember that fat free does not mean calorie free  These foods still contain calories, and too many calories can lead to weight gain  · Trim fat from meat and avoid fried food  Trim all visible fat from meat before you cook it  Remove the skin from poultry  Do not foster meat, fish, or poultry  Bake, roast, boil, or broil these foods instead  Avoid fried foods  Eat a baked potato instead of Western Kathya fries  Steam vegetables instead of sautéing them in butter  · Add less fat to foods  Use imitation young bits on salads and baked potatoes instead of regular young bits  Use fat-free or low-fat salad dressings instead of regular dressings  Use low-fat or nonfat butter-flavored topping instead of regular butter or margarine on popcorn and other foods  Ways to decrease fat in recipes:  Replace high-fat ingredients with low-fat or nonfat ones   This may cause baked goods to be drier than usual  You may need to use nonfat cooking spray on pans to prevent food from sticking  You also may need to change the amount of other ingredients, such as water, in the recipe  Try the following:  · Use low-fat or light margarine instead of regular margarine or shortening  · Use lean ground turkey breast or chicken, or lean ground beef (less than 5% fat) instead of hamburger  · Add 1 teaspoon of canola oil to 8 ounces of skim milk instead of using cream or half and half  · Use grated zucchini, carrots, or apples in breads instead of coconut  · Use blenderized, low-fat cottage cheese, plain tofu, or low-fat ricotta cheese instead of cream cheese  · Use 1 egg white and 1 teaspoon of canola oil, or use ¼ cup (2 ounces) of fat-free egg substitute instead of a whole egg  · Replace half of the oil that is called for in a recipe with applesauce when you bake  Use 3 tablespoons of cocoa powder and 1 tablespoon of canola oil instead of a square of baking chocolate  How to increase fiber:  Eat enough high-fiber foods to get 20 to 30 grams of fiber every day  Slowly increase your fiber intake to avoid stomach cramps, gas, and other problems  · Eat 3 ounces of whole-grain foods each day  An ounce is about 1 slice of bread  Eat whole-grain breads, such as whole-wheat bread  Whole wheat, whole-wheat flour, or other whole grains should be listed as the first ingredient on the food label  Replace white flour with whole-grain flour or use half of each in recipes  Whole-grain flour is heavier than white flour, so you may have to add more yeast or baking powder  · Eat a high-fiber cereal for breakfast   Oatmeal is a good source of soluble fiber  Look for cereals that have bran or fiber in the name  Choose whole-grain products, such as brown rice, barley, and whole-wheat pasta  · Eat more beans, peas, and lentils  For example, add beans to soups or salads  Eat at least 5 cups of fruits and vegetables each day   Eat fruits and vegetables with the peel because the peel is high in fiber  © 2017 [employer] [employer]  Information is for End User's use only and may not be sold, redistributed or otherwise used for commercial purposes  All illustrations and images included in CareNotes® are the copyrighted property of [employer]  or Kelvin Hughes  The above information is an  only  It is not intended as medical advice for individual conditions or treatments  Talk to your doctor, nurse or pharmacist before following any medical regimen to see if it is safe and effective for you  Heart Healthy Diet   AMBULATORY CARE:   A heart healthy diet  is an eating plan low in total fat, unhealthy fats, and sodium (salt)  A heart healthy diet helps decrease your risk for heart disease and stroke  Limit the amount of fat you eat to 25% to 35% of your total daily calories  Limit sodium to less than 2,300 mg each day  Healthy fats:  Healthy fats can help improve cholesterol levels  The risk for heart disease is decreased when cholesterol levels are normal  Choose healthy fats, such as the following:  · Unsaturated fat  is found in foods such as soybean, canola, olive, corn, and safflower oils  It is also found in soft tub margarine that is made with liquid vegetable oil  · Omega-3 fat  is found in certain fish, such as salmon, tuna, and trout, and in walnuts and flaxseed  Unhealthy fats:  Unhealthy fats can cause unhealthy cholesterol levels in your blood and increase your risk of heart disease  Limit unhealthy fats, such as the following:  · Cholesterol  is found in animal foods, such as eggs and lobster, and in dairy products made from whole milk  Limit cholesterol to less than 300 milligrams (mg) each day  You may need to limit cholesterol to 200 mg each day if you have heart disease  · Saturated fat  is found in meats, such as young and hamburger   It is also found in chicken or turkey skin, whole milk, and butter  Limit saturated fat to less than 7% of your total daily calories  Limit saturated fat to less than 6% if you have heart disease or are at increased risk for it  · Trans fat  is found in packaged foods, such as potato chips and cookies  It is also in hard margarine, some fried foods, and shortening  Avoid trans fats as much as possible    Heart healthy foods and drinks to include:  Ask your dietitian or healthcare provider how many servings to have from each of the following food groups:  · Grains:      ¨ Whole-wheat breads, cereals, and pastas, and brown rice    ¨ Low-fat, low-sodium crackers and chips    · Vegetables:      ¨ Broccoli, green beans, green peas, and spinach    ¨ Collards, kale, and lima beans    ¨ Carrots, sweet potatoes, tomatoes, and peppers    ¨ Canned vegetables with no salt added    · Fruits:      ¨ Bananas, peaches, pears, and pineapple    ¨ Grapes, raisins, and dates    ¨ Oranges, tangerines, grapefruit, orange juice, and grapefruit juice    ¨ Apricots, mangoes, melons, and papaya    ¨ Raspberries and strawberries    ¨ Canned fruit with no added sugar    · Low-fat dairy products:      ¨ Nonfat (skim) milk, 1% milk, and low-fat almond, cashew, or soy milks fortified with calcium    ¨ Low-fat cheese, regular or frozen yogurt, and cottage cheese    · Meats and proteins , such as lean cuts of beef and pork (loin, leg, round), skinless chicken and turkey, legumes, soy products, egg whites, and nuts  Foods and drinks to limit or avoid:  Ask your dietitian or healthcare provider about these and other foods that are high in unhealthy fat, sodium, and sugar:  · Snack or packaged foods , such as frozen dinners, cookies, macaroni and cheese, and cereals with more than 300 mg of sodium per serving    · Canned or dry mixes  for cakes, soups, sauces, or gravies    · Vegetables with added sodium , such as instant potatoes, vegetables with added sauces, or regular canned vegetables    · Other foods high in sodium , such as ketchup, barbecue sauce, salad dressing, pickles, olives, soy sauce, and miso    · High-fat dairy foods  such as whole or 2% milk, cream cheese, or sour cream, and cheeses     · High-fat protein foods  such as high-fat cuts of beef (T-bone steaks, ribs), chicken or turkey with skin, and organ meats, such as liver    · Cured or smoked meats , such as hot dogs, young, and sausage    · Unhealthy fats and oils , such as butter, stick margarine, shortening, and cooking oils such as coconut or palm oil    · Food and drinks high in sugar , such as soft drinks (soda), sports drinks, sweetened tea, candy, cake, cookies, pies, and doughnuts  Other diet guidelines to follow:   · Eat more foods containing omega-3 fats  Eat fish high in omega-3 fats at least 2 times a week  · Limit alcohol  Too much alcohol can damage your heart and raise your blood pressure  Women should limit alcohol to 1 drink a day  Men should limit alcohol to 2 drinks a day  A drink of alcohol is 12 ounces of beer, 5 ounces of wine, or 1½ ounces of liquor  · Choose low-sodium foods  High-sodium foods can lead to high blood pressure  Add little or no salt to food you prepare  Use herbs and spices in place of salt  · Eat more fiber  to help lower cholesterol levels  Eat at least 5 servings of fruits and vegetables each day  Eat 3 ounces of whole-grain foods each day  Legumes (beans) are also a good source of fiber  Lifestyle guidelines:   · Do not smoke  Nicotine and other chemicals in cigarettes and cigars can cause lung and heart damage  Ask your healthcare provider for information if you currently smoke and need help to quit  E-cigarettes or smokeless tobacco still contain nicotine  Talk to your healthcare provider before you use these products  · Exercise regularly  to help you maintain a healthy weight and improve your blood pressure and cholesterol levels   Ask your healthcare provider about the best exercise plan for you  Do not start an exercise program without asking your healthcare provider  Follow up with your healthcare provider as directed:  Write down your questions so you remember to ask them during your visits  © 2017 [address] [employer] [employer] Information is for End User's use only and may not be sold, redistributed or otherwise used for commercial purposes  All illustrations and images included in CareNotes® are the copyrighted property of [employer]  or Kelvin Hughes  The above information is an  only  It is not intended as medical advice for individual conditions or treatments  Talk to your doctor, nurse or pharmacist before following any medical regimen to see if it is safe and effective for you  Calorie Counting Diet   WHAT YOU NEED TO KNOW:   What is a calorie counting diet? It is a meal plan based on counting calories each day to reach a healthy body weight  You will need to eat fewer calories if you are trying to lose weight  Weight loss may decrease your risk for certain health problems or improve your health if you have health problems  Some of these health problems include heart disease, high blood pressure, and diabetes  What foods should I avoid? Your dietitian will tell you if you need to avoid certain foods based on your body weight and health condition  You may need to avoid high-fat foods if you are at risk for or have heart disease  You may need to eat fewer foods from the breads and starches food group if you have diabetes  How many calories are in foods? The following is a list of foods and drinks with the approximate number of calories in each  Check the food label to find the exact number of calories  A dietitian can tell you how many calories you should have from each food group each day    · Carbohydrate:      ¨ ½ of a 3-inch bagel, 1 slice of bread, or ½ of a hamburger bun or hot dog bun (80)    ¨ 1 (8-inch) flour tortilla or ½ cup of cooked rice (100)    ¨ 1 (6-inch) corn tortilla (80)    ¨ 1 (6-inch) pancake or 1 cup of bran flakes cereal (110)    ¨ ½ cup of cooked cereal (80)    ¨ ½ cup of cooked pasta (85)    ¨ 1 ounce of pretzels (100)    ¨ 3 cups of air-popped popcorn without butter or oil (80)    · Dairy:      ¨ 1 cup of skim or 1% milk (90)    ¨ 1 cup of 2% milk (120)    ¨ 1 cup of whole milk (160)    ¨ 1 cup of 2% chocolate milk (220)    ¨ 1 ounce of low-fat cheese with 3 grams of fat per ounce (70)    ¨ 1 ounce of cheddar cheese (114)    ¨ ½ cup of 1% fat cottage cheese (80)    ¨ 1 cup of plain or sugar-free, fat-free yogurt (90)    · Protein foods:      ¨ 3 ounces of fish (not breaded or fried) (95)    ¨ 3 ounces of breaded, fried fish (195)    ¨ ¾ cup of tuna canned in water (105)    ¨ 3 ounces of chicken breast without skin (105)    ¨ 1 fried chicken breast with skin (350)    ¨ ¼ cup of fat free egg substitute (40)    ¨ 1 large egg (75)    ¨ 3 ounces of lean beef or pork (165)    ¨ 3 ounces of fried pork chop or ham (185)    ¨ ½ cup of cooked dried beans, such as kidney, velasquez, lentils, or navy (115)    ¨ 3 ounces of bologna or lunch meat (225)    ¨ 2 links of breakfast sausage (140)    · Vegetables:      ¨ ½ cup of sliced mushrooms (10)    ¨ 1 cup of salad greens, such as lettuce, spinach, or lazaro (15)    ¨ ½ cup of steamed asparagus (20)    ¨ ½ cup of cooked summer squash, zucchini squash, or green or wax beans (25)    ¨ 1 cup of broccoli or cauliflower florets, or 1 medium tomato (25)    ¨ 1 large raw carrot or ½ cup of cooked carrots (40)    ¨ ? of a medium cucumber or 1 stalk of celery (5)    ¨ 1 small baked potato (160)    ¨ 1 cup of breaded, fried vegetables (230)    · Fruit:      ¨ 1 (6-inch) banana (55)     ¨ ½ of a 4-inch grapefruit (55)    ¨ 15 grapes (60)    ¨ 1 medium orange or apple (70)    ¨ 1 large peach (65)    ¨ 1 cup of fresh pineapple chunks (75)    ¨ 1 cup of melon cubes (50)    ¨ 1¼ cups of whole strawberries (45)    ¨ ½ cup of fruit canned in juice (55)    ¨ ½ cup of fruit canned in heavy syrup (110)    ¨ ?  cup of raisins (130)    ¨ ½ cup of unsweetened fruit juice (60)    ¨ ½ cup of grape, cranberry, or prune juice (90)    · Fat:      ¨ 10 peanuts or 2 teaspoons of peanut butter (55)    ¨ 2 tablespoons of avocado or 1 tablespoon of regular salad dressing (45)    ¨ 2 slices of young (90)    ¨ 1 teaspoon of oil, such as safflower, canola, corn, or olive oil (45)    ¨ 2 teaspoons of low-fat margarine, or 1 tablespoon of low-fat mayonnaise (50)    ¨ 1 teaspoon of regular margarine (40)    ¨ 1 tablespoon of regular mayonnaise (135)    ¨ 1 tablespoon of cream cheese or 2 tablespoons of low-fat cream cheese (45)    ¨ 2 tablespoons of vegetable shortening (215)    · Dessert and sweets:      ¨ 8 animal crackers or 5 vanilla wafers (80)    ¨ 1 frozen fruit juice bar (80)    ¨ ½ cup of ice milk or low-fat frozen yogurt (90)    ¨ ½ cup of sherbet or sorbet (125)    ¨ ½ cup of sugar-free pudding or custard (60)    ¨ ½ cup of ice cream (140)    ¨ ½ cup of pudding or custard (175)    ¨ 1 (2-inch) square chocolate brownie (185)    · Combination foods:      ¨ Bean burrito made with an 8-inch tortilla, without cheese (275)    ¨ Chicken breast sandwich with lettuce and tomato (325)    ¨ 1 cup of chicken noodle soup (60)    ¨ 1 beef taco (175)    ¨ Regular hamburger with lettuce and tomato (310)    ¨ Regular cheeseburger with lettuce and tomato (410)     ¨ ¼ of a 12-inch cheese pizza (280)    ¨ Fried fish sandwich with lettuce and tomato (425)    ¨ Hot dog and bun (275)    ¨ 1½ cups of macaroni and cheese (310)    ¨ Taco salad with a fried tortilla shell (870)    · Low-calorie foods:      ¨ 1 tablespoon of ketchup or 1 tablespoon of fat free sour cream (15)    ¨ 1 teaspoon of mustard (5)    ¨ ¼ cup of salsa (20)    ¨ 1 large dill pickle (15)    ¨ 1 tablespoon of fat free salad dressing (10)    ¨ 2 teaspoons of low-sugar, light jam or jelly, or 1 tablespoon of sugar-free syrup (15)    ¨ 1 sugar-free popsicle (15)    ¨ 1 cup of club soda, seltzer water, or diet soda (0)  CARE AGREEMENT:   You have the right to help plan your care  Discuss treatment options with your caregivers to decide what care you want to receive  You always have the right to refuse treatment  The above information is an  only  It is not intended as medical advice for individual conditions or treatments  Talk to your doctor, nurse or pharmacist before following any medical regimen to see if it is safe and effective for you  © 2017 [address]  Information is for End User's use only and may not be sold, redistributed or otherwise used for commercial purposes  All illustrations and images included in CareNotes® are the copyrighted property of [employer] , [employer]  or MELECIO Hale    Portions of the record may have been created with voice recognition software  Occasional wrong word or "sound a like" substitutions may have occurred due to the inherent limitations of voice recognition software  Read the chart carefully and recognize, using context, where substitutions have occurred  BMI Counseling: Body mass index is 30 47 kg/m²  The BMI is above normal  Nutrition recommendations include reducing portion sizes, decreasing overall calorie intake, 3-5 servings of fruits/vegetables daily, reducing fast food intake, consuming healthier snacks, decreasing soda and/or juice intake, moderation in carbohydrate intake, increasing intake of lean protein, reducing intake of saturated fat and trans fat and reducing intake of cholesterol  Exercise recommendations include moderate aerobic physical activity for 150 minutes/week

## 2020-08-15 DIAGNOSIS — F31.9 BIPOLAR DEPRESSION (HCC): ICD-10-CM

## 2020-08-15 LAB
EXTERNAL HIV SCREEN: NORMAL
HCV AB SER-ACNC: NEGATIVE

## 2020-08-17 RX ORDER — VENLAFAXINE 37.5 MG/1
TABLET ORAL
Qty: 30 TABLET | Refills: 0 | Status: SHIPPED | OUTPATIENT
Start: 2020-08-17 | End: 2021-01-18 | Stop reason: ALTCHOICE

## 2020-08-19 ENCOUNTER — TELEPHONE (OUTPATIENT)
Dept: FAMILY MEDICINE CLINIC | Facility: CLINIC | Age: 60
End: 2020-08-19

## 2020-08-19 NOTE — TELEPHONE ENCOUNTER
No, if she is doing ok off the venlafaxine, stay off it  That's great  patient unable to provide infromation

## 2020-08-19 NOTE — TELEPHONE ENCOUNTER
Patient called saying she was off of her venlafaxine for three days because her prescription was up  She has it now since it was called in, but she wants to know if she should take it anymore since she was off of it for three days? I did inform her that you would not be back until Thursday  She does not mind waiting since you know her health issues

## 2020-09-22 ENCOUNTER — TELEPHONE (OUTPATIENT)
Dept: FAMILY MEDICINE CLINIC | Facility: CLINIC | Age: 60
End: 2020-09-22

## 2020-09-22 NOTE — TELEPHONE ENCOUNTER
Patient called saying she has bone spurs in her heels  She would like a referral of where she should go to next for this please

## 2020-09-23 DIAGNOSIS — M77.30 CALCANEAL SPUR, UNSPECIFIED LATERALITY: Primary | ICD-10-CM

## 2020-09-28 ENCOUNTER — TELEPHONE (OUTPATIENT)
Dept: FAMILY MEDICINE CLINIC | Facility: CLINIC | Age: 60
End: 2020-09-28

## 2020-09-28 NOTE — TELEPHONE ENCOUNTER
Patient called saying she saw Dr Loni Chacon and she needs to know what medication she was allergic to a year ago? She thinks it was something for back pain, but she cannot remember  I saw from 3-9-18 she had Flexeril prescribed for acute low back pain, I thought maybe it was that, but she is unsure

## 2020-09-29 ENCOUNTER — TELEPHONE (OUTPATIENT)
Dept: FAMILY MEDICINE CLINIC | Facility: CLINIC | Age: 60
End: 2020-09-29

## 2020-09-29 NOTE — TELEPHONE ENCOUNTER
Christopher Pacheco called back stating she needs an insurance referral for her appointment yesterday, 9/28/2020   She would like that faxed so she can send it to insurance

## 2020-09-29 NOTE — TELEPHONE ENCOUNTER
Dr Marimar Chase office called about insurance referral  I spoke to [place] and she said Robert Ornelas is working on prior [employer]  Referral not needed unless it is out of network

## 2020-09-29 NOTE — TELEPHONE ENCOUNTER
Lucas Button form [facility] office called saying they did not receive patients referral to see them  I faxed it to them at [phone]

## 2020-09-29 NOTE — TELEPHONE ENCOUNTER
I spoke with insurance and her plan does not require an insurance referral unless the provider she saw is out of network  I am in the process of doing an authorizations for her visit yesterday  No guarantee it will be covered after her visit was completed       CASE # [id number] everything was faxed to Dora Salinas the nurse

## 2020-09-30 NOTE — TELEPHONE ENCOUNTER
Office called again  I gave them Freddy's message  I let them know we are waiting on insurance to get back to us

## 2020-10-02 ENCOUNTER — APPOINTMENT (OUTPATIENT)
Dept: LAB | Facility: CLINIC | Age: 60
End: 2020-10-02
Payer: COMMERCIAL

## 2020-10-02 ENCOUNTER — TELEPHONE (OUTPATIENT)
Dept: FAMILY MEDICINE CLINIC | Facility: CLINIC | Age: 60
End: 2020-10-02

## 2020-10-02 DIAGNOSIS — R30.0 DYSURIA: ICD-10-CM

## 2020-10-02 DIAGNOSIS — R30.0 DYSURIA: Primary | ICD-10-CM

## 2020-10-02 PROCEDURE — 82043 UR ALBUMIN QUANTITATIVE: CPT | Performed by: FAMILY MEDICINE

## 2020-10-02 PROCEDURE — 81001 URINALYSIS AUTO W/SCOPE: CPT | Performed by: FAMILY MEDICINE

## 2020-10-02 PROCEDURE — 87147 CULTURE TYPE IMMUNOLOGIC: CPT

## 2020-10-02 PROCEDURE — 87086 URINE CULTURE/COLONY COUNT: CPT

## 2020-10-02 PROCEDURE — 87077 CULTURE AEROBIC IDENTIFY: CPT

## 2020-10-02 PROCEDURE — 82570 ASSAY OF URINE CREATININE: CPT | Performed by: FAMILY MEDICINE

## 2020-10-03 DIAGNOSIS — R31.9 URINARY TRACT INFECTION WITH HEMATURIA, SITE UNSPECIFIED: Primary | ICD-10-CM

## 2020-10-03 DIAGNOSIS — N39.0 URINARY TRACT INFECTION WITH HEMATURIA, SITE UNSPECIFIED: Primary | ICD-10-CM

## 2020-10-03 LAB
BACTERIA UR QL AUTO: ABNORMAL /HPF
BILIRUB UR QL STRIP: NEGATIVE
CLARITY UR: ABNORMAL
COLOR UR: YELLOW
CREAT UR-MCNC: 60.2 MG/DL
GLUCOSE UR STRIP-MCNC: NEGATIVE MG/DL
HGB UR QL STRIP.AUTO: ABNORMAL
HYALINE CASTS #/AREA URNS LPF: ABNORMAL /LPF
KETONES UR STRIP-MCNC: NEGATIVE MG/DL
LEUKOCYTE ESTERASE UR QL STRIP: ABNORMAL
MICROALBUMIN UR-MCNC: 230 MG/L (ref 0–20)
MICROALBUMIN/CREAT 24H UR: 382 MG/G CREATININE (ref 0–30)
NITRITE UR QL STRIP: NEGATIVE
NON-SQ EPI CELLS URNS QL MICRO: ABNORMAL /HPF
PH UR STRIP.AUTO: 6.5 [PH]
PROT UR STRIP-MCNC: ABNORMAL MG/DL
RBC #/AREA URNS AUTO: ABNORMAL /HPF
SP GR UR STRIP.AUTO: 1.02 (ref 1–1.03)
UROBILINOGEN UR QL STRIP.AUTO: 1 E.U./DL
WBC #/AREA URNS AUTO: ABNORMAL /HPF

## 2020-10-03 RX ORDER — NITROFURANTOIN 25; 75 MG/1; MG/1
100 CAPSULE ORAL 2 TIMES DAILY
Qty: 10 CAPSULE | Refills: 0 | Status: SHIPPED | OUTPATIENT
Start: 2020-10-03 | End: 2020-10-08

## 2020-10-05 LAB — BACTERIA UR CULT: ABNORMAL

## 2020-10-12 DIAGNOSIS — F41.8 DEPRESSION WITH ANXIETY: Primary | ICD-10-CM

## 2020-10-12 RX ORDER — ALPRAZOLAM 0.5 MG/1
TABLET ORAL
COMMUNITY
End: 2020-10-12 | Stop reason: SDUPTHER

## 2020-10-13 RX ORDER — ALPRAZOLAM 0.5 MG/1
TABLET ORAL
Qty: 30 TABLET | Refills: 0 | Status: SHIPPED | OUTPATIENT
Start: 2020-10-13 | End: 2021-01-05

## 2020-12-03 ENCOUNTER — TELEPHONE (OUTPATIENT)
Dept: FAMILY MEDICINE CLINIC | Facility: CLINIC | Age: 60
End: 2020-12-03

## 2020-12-04 ENCOUNTER — TELEMEDICINE (OUTPATIENT)
Dept: FAMILY MEDICINE CLINIC | Facility: CLINIC | Age: 60
End: 2020-12-04
Payer: COMMERCIAL

## 2020-12-04 ENCOUNTER — LAB (OUTPATIENT)
Dept: LAB | Facility: CLINIC | Age: 60
End: 2020-12-04
Payer: COMMERCIAL

## 2020-12-04 VITALS — BODY MASS INDEX: 29.06 KG/M2 | HEIGHT: 63 IN | TEMPERATURE: 96.6 F | WEIGHT: 164 LBS

## 2020-12-04 DIAGNOSIS — Z12.11 SCREENING FOR MALIGNANT NEOPLASM OF COLON: ICD-10-CM

## 2020-12-04 DIAGNOSIS — R73.01 IFG (IMPAIRED FASTING GLUCOSE): ICD-10-CM

## 2020-12-04 DIAGNOSIS — R63.5 WEIGHT GAIN: ICD-10-CM

## 2020-12-04 DIAGNOSIS — M25.50 DIFFUSE ARTHRALGIA: ICD-10-CM

## 2020-12-04 DIAGNOSIS — N91.2 AMENORRHEA: Primary | ICD-10-CM

## 2020-12-04 DIAGNOSIS — R11.0 NAUSEA: Primary | ICD-10-CM

## 2020-12-04 DIAGNOSIS — Z12.31 SCREENING MAMMOGRAM, ENCOUNTER FOR: ICD-10-CM

## 2020-12-04 DIAGNOSIS — Z13.220 SCREENING, LIPID: ICD-10-CM

## 2020-12-04 LAB
ALBUMIN SERPL BCP-MCNC: 3.9 G/DL (ref 3.5–5)
ALP SERPL-CCNC: 72 U/L (ref 46–116)
ALT SERPL W P-5'-P-CCNC: 24 U/L (ref 12–78)
ANION GAP SERPL CALCULATED.3IONS-SCNC: 3 MMOL/L (ref 4–13)
AST SERPL W P-5'-P-CCNC: 20 U/L (ref 5–45)
BILIRUB SERPL-MCNC: 0.31 MG/DL (ref 0.2–1)
BUN SERPL-MCNC: 16 MG/DL (ref 5–25)
CALCIUM SERPL-MCNC: 9.6 MG/DL (ref 8.3–10.1)
CHLORIDE SERPL-SCNC: 107 MMOL/L (ref 100–108)
CHOLEST SERPL-MCNC: 165 MG/DL (ref 50–200)
CO2 SERPL-SCNC: 29 MMOL/L (ref 21–32)
CREAT SERPL-MCNC: 1.22 MG/DL (ref 0.6–1.3)
CRP SERPL QL: 37.4 MG/L
EST. AVERAGE GLUCOSE BLD GHB EST-MCNC: 114 MG/DL
FSH SERPL-ACNC: 58.2 MIU/ML
GFR SERPL CREATININE-BSD FRML MDRD: 49 ML/MIN/1.73SQ M
GLUCOSE SERPL-MCNC: 96 MG/DL (ref 65–140)
HBA1C MFR BLD: 5.6 %
HDLC SERPL-MCNC: 51 MG/DL
LDLC SERPL CALC-MCNC: 95 MG/DL (ref 0–100)
LH SERPL-ACNC: 38.1 MIU/ML
NONHDLC SERPL-MCNC: 114 MG/DL
POTASSIUM SERPL-SCNC: 4.2 MMOL/L (ref 3.5–5.3)
PROT SERPL-MCNC: 7.7 G/DL (ref 6.4–8.2)
SODIUM SERPL-SCNC: 139 MMOL/L (ref 136–145)
TRIGL SERPL-MCNC: 95 MG/DL
TSH SERPL DL<=0.05 MIU/L-ACNC: 1.14 UIU/ML (ref 0.36–3.74)

## 2020-12-04 PROCEDURE — 86200 CCP ANTIBODY: CPT | Performed by: FAMILY MEDICINE

## 2020-12-04 PROCEDURE — 86431 RHEUMATOID FACTOR QUANT: CPT

## 2020-12-04 PROCEDURE — 80061 LIPID PANEL: CPT

## 2020-12-04 PROCEDURE — 83002 ASSAY OF GONADOTROPIN (LH): CPT

## 2020-12-04 PROCEDURE — 84443 ASSAY THYROID STIM HORMONE: CPT

## 2020-12-04 PROCEDURE — 86038 ANTINUCLEAR ANTIBODIES: CPT

## 2020-12-04 PROCEDURE — 83036 HEMOGLOBIN GLYCOSYLATED A1C: CPT

## 2020-12-04 PROCEDURE — 99214 OFFICE O/P EST MOD 30 MIN: CPT | Performed by: FAMILY MEDICINE

## 2020-12-04 PROCEDURE — 86140 C-REACTIVE PROTEIN: CPT

## 2020-12-04 PROCEDURE — 83001 ASSAY OF GONADOTROPIN (FSH): CPT

## 2020-12-04 PROCEDURE — 36415 COLL VENOUS BLD VENIPUNCTURE: CPT | Performed by: FAMILY MEDICINE

## 2020-12-04 PROCEDURE — 86430 RHEUMATOID FACTOR TEST QUAL: CPT

## 2020-12-04 PROCEDURE — 80053 COMPREHEN METABOLIC PANEL: CPT

## 2020-12-04 RX ORDER — ONDANSETRON 4 MG/1
4 TABLET, FILM COATED ORAL EVERY 8 HOURS PRN
Qty: 20 TABLET | Refills: 0 | Status: SHIPPED | OUTPATIENT
Start: 2020-12-04 | End: 2021-11-23 | Stop reason: ALTCHOICE

## 2020-12-07 ENCOUNTER — TELEPHONE (OUTPATIENT)
Dept: FAMILY MEDICINE CLINIC | Facility: CLINIC | Age: 60
End: 2020-12-07

## 2020-12-07 ENCOUNTER — LAB (OUTPATIENT)
Dept: LAB | Facility: CLINIC | Age: 60
End: 2020-12-07
Payer: COMMERCIAL

## 2020-12-07 DIAGNOSIS — R76.8 RHEUMATOID FACTOR POSITIVE: Primary | ICD-10-CM

## 2020-12-07 DIAGNOSIS — Z12.11 SCREENING FOR MALIGNANT NEOPLASM OF COLON: ICD-10-CM

## 2020-12-07 LAB
CRYOGLOB RF SER-ACNC: ABNORMAL [IU]/ML
HEMOCCULT STL QL IA: NEGATIVE
RHEUMATOID FACT SER QL LA: POSITIVE
RYE IGE QN: NEGATIVE

## 2020-12-07 PROCEDURE — G0328 FECAL BLOOD SCRN IMMUNOASSAY: HCPCS

## 2020-12-08 ENCOUNTER — OFFICE VISIT (OUTPATIENT)
Dept: FAMILY MEDICINE CLINIC | Facility: CLINIC | Age: 60
End: 2020-12-08
Payer: COMMERCIAL

## 2020-12-08 VITALS
OXYGEN SATURATION: 98 % | SYSTOLIC BLOOD PRESSURE: 146 MMHG | DIASTOLIC BLOOD PRESSURE: 87 MMHG | HEART RATE: 92 BPM | HEIGHT: 63 IN | RESPIRATION RATE: 14 BRPM | BODY MASS INDEX: 28.88 KG/M2 | TEMPERATURE: 99.8 F | WEIGHT: 163 LBS

## 2020-12-08 DIAGNOSIS — R11.0 NAUSEA: ICD-10-CM

## 2020-12-08 DIAGNOSIS — R76.8 RHEUMATOID FACTOR POSITIVE: Primary | ICD-10-CM

## 2020-12-08 DIAGNOSIS — Z12.31 ENCOUNTER FOR SCREENING MAMMOGRAM FOR MALIGNANT NEOPLASM OF BREAST: ICD-10-CM

## 2020-12-08 LAB — CCP IGA+IGG SERPL IA-ACNC: 5 UNITS (ref 0–19)

## 2020-12-08 PROCEDURE — 99214 OFFICE O/P EST MOD 30 MIN: CPT | Performed by: FAMILY MEDICINE

## 2020-12-11 ENCOUNTER — HOSPITAL ENCOUNTER (OUTPATIENT)
Dept: ULTRASOUND IMAGING | Facility: CLINIC | Age: 60
Discharge: HOME | End: 2020-12-11
Payer: COMMERCIAL

## 2020-12-11 DIAGNOSIS — R11.0 NAUSEA: ICD-10-CM

## 2020-12-11 PROCEDURE — 76705 ECHO EXAM OF ABDOMEN: CPT

## 2020-12-18 ENCOUNTER — OFFICE VISIT (OUTPATIENT)
Dept: FAMILY MEDICINE CLINIC | Facility: CLINIC | Age: 60
End: 2020-12-18
Payer: COMMERCIAL

## 2020-12-18 VITALS
HEIGHT: 63 IN | BODY MASS INDEX: 29.62 KG/M2 | DIASTOLIC BLOOD PRESSURE: 80 MMHG | RESPIRATION RATE: 18 BRPM | TEMPERATURE: 98.7 F | WEIGHT: 167.2 LBS | OXYGEN SATURATION: 99 % | SYSTOLIC BLOOD PRESSURE: 144 MMHG | HEART RATE: 83 BPM

## 2020-12-18 DIAGNOSIS — M77.30 HEEL SPUR, UNSPECIFIED LATERALITY: ICD-10-CM

## 2020-12-18 DIAGNOSIS — M25.50 DIFFUSE ARTHRALGIA: ICD-10-CM

## 2020-12-18 DIAGNOSIS — R11.0 NAUSEA: Primary | ICD-10-CM

## 2020-12-18 DIAGNOSIS — I10 BENIGN HYPERTENSION: ICD-10-CM

## 2020-12-18 DIAGNOSIS — K76.0 FATTY LIVER: ICD-10-CM

## 2020-12-18 PROCEDURE — 99214 OFFICE O/P EST MOD 30 MIN: CPT | Performed by: FAMILY MEDICINE

## 2020-12-18 RX ORDER — AMLODIPINE BESYLATE 5 MG/1
5 TABLET ORAL DAILY
Qty: 30 TABLET | Refills: 1 | Status: SHIPPED | OUTPATIENT
Start: 2020-12-18 | End: 2021-01-18 | Stop reason: SDUPTHER

## 2021-01-04 DIAGNOSIS — F41.8 DEPRESSION WITH ANXIETY: ICD-10-CM

## 2021-01-05 RX ORDER — ALPRAZOLAM 0.5 MG/1
TABLET ORAL
Qty: 30 TABLET | Refills: 0 | Status: SHIPPED | OUTPATIENT
Start: 2021-01-05 | End: 2021-03-24

## 2021-01-13 ENCOUNTER — HOSPITAL ENCOUNTER (OUTPATIENT)
Dept: MAMMOGRAPHY | Facility: CLINIC | Age: 61
Discharge: HOME | End: 2021-01-13
Payer: COMMERCIAL

## 2021-01-13 VITALS — BODY MASS INDEX: 29.59 KG/M2 | WEIGHT: 167 LBS | HEIGHT: 63 IN

## 2021-01-13 DIAGNOSIS — Z12.31 ENCOUNTER FOR SCREENING MAMMOGRAM FOR MALIGNANT NEOPLASM OF BREAST: ICD-10-CM

## 2021-01-13 PROCEDURE — 77067 SCR MAMMO BI INCL CAD: CPT

## 2021-01-13 PROCEDURE — 77063 BREAST TOMOSYNTHESIS BI: CPT

## 2021-01-18 ENCOUNTER — OFFICE VISIT (OUTPATIENT)
Dept: FAMILY MEDICINE CLINIC | Facility: CLINIC | Age: 61
End: 2021-01-18
Payer: COMMERCIAL

## 2021-01-18 VITALS
WEIGHT: 162 LBS | TEMPERATURE: 97.8 F | BODY MASS INDEX: 28.7 KG/M2 | DIASTOLIC BLOOD PRESSURE: 80 MMHG | OXYGEN SATURATION: 99 % | HEART RATE: 79 BPM | HEIGHT: 63 IN | SYSTOLIC BLOOD PRESSURE: 130 MMHG | RESPIRATION RATE: 18 BRPM

## 2021-01-18 DIAGNOSIS — F41.8 DEPRESSION WITH ANXIETY: ICD-10-CM

## 2021-01-18 DIAGNOSIS — I10 BENIGN HYPERTENSION: Primary | ICD-10-CM

## 2021-01-18 DIAGNOSIS — R76.8 RHEUMATOID FACTOR POSITIVE: ICD-10-CM

## 2021-01-18 PROCEDURE — 1036F TOBACCO NON-USER: CPT | Performed by: FAMILY MEDICINE

## 2021-01-18 PROCEDURE — 3008F BODY MASS INDEX DOCD: CPT | Performed by: FAMILY MEDICINE

## 2021-01-18 PROCEDURE — 3079F DIAST BP 80-89 MM HG: CPT | Performed by: FAMILY MEDICINE

## 2021-01-18 PROCEDURE — 3075F SYST BP GE 130 - 139MM HG: CPT | Performed by: FAMILY MEDICINE

## 2021-01-18 PROCEDURE — 99214 OFFICE O/P EST MOD 30 MIN: CPT | Performed by: FAMILY MEDICINE

## 2021-01-18 RX ORDER — AMLODIPINE BESYLATE 5 MG/1
5 TABLET ORAL DAILY
Qty: 30 TABLET | Refills: 3 | Status: SHIPPED | OUTPATIENT
Start: 2021-01-18 | End: 2021-06-07

## 2021-01-18 NOTE — PROGRESS NOTES
Assessment/Plan:     Chronic Problems:  Benign hypertension  At goal on current meds  Continue the same  Rheumatoid factor positive    Patient feels a joint pains are better at this time and prefers to wait on seeing a rheumatologist     Depression with anxiety  Seems to be very stable on buspar  Visit Diagnosis:  Diagnoses and all orders for this visit:    Benign hypertension  -     amLODIPine (NORVASC) 5 mg tablet; Take 1 tablet (5 mg total) by mouth daily    Rheumatoid factor positive    Depression with anxiety          Subjective:    Patient ID: Gaurav Landis is a 63 y o  female  Pt is here for f/u on her bp's  Did not take her meds today as she was not sure if her meds were going to be changes  This am bp was 116/71 with hr of 87  Got drunk the other day and hr was 109  Has her list of blood pressures from home  Prior to starting the amlodipine her blood pressures were in the 140s to 160s over 80s to 90s  Now on the amlodipine she is getting blood pressures that range from 115/75  To a max 152/86  Most blood pressures are in the 120s to 130 range  No problems on this med  Still does not want to see rheumatology yet  Ordered inserts for her shoes  Takes all other meds as directed  No side effects noted  The following portions of the patient's history were reviewed and updated as appropriate: allergies, current medications, past family history, past medical history, past social history, past surgical history and problem list     Review of Systems   Constitutional: Negative for chills, diaphoresis, fatigue and fever  HENT: Negative  Eyes: Negative  Respiratory: Negative for cough, shortness of breath and wheezing  Cardiovascular: Negative for chest pain and palpitations  Gastrointestinal: Negative  Endocrine:        Still with hot flashes in the middle of the night  Genitourinary: Negative      Musculoskeletal: Positive for arthralgias (but feels her hands are better now  Feet also feel slightly better  )  Negative for back pain and neck pain  Neurological: Negative for dizziness, light-headedness and headaches  Psychiatric/Behavioral: Negative for dysphoric mood  The patient is nervous/anxious (about covid  Pt has been off venlafaxine and just using buspar and a rare xanax  )            /80 (BP Location: Left arm, Patient Position: Sitting)   Pulse 79   Temp 97 8 °F (36 6 °C) (Tympanic)   Resp 18   Ht 5' 3" (1 6 m)   Wt 73 5 kg (162 lb)   SpO2 99%   BMI 28 70 kg/m²   Social History     Socioeconomic History    Marital status: /Civil Union     Spouse name: Not on file    Number of children: Not on file    Years of education: Not on file    Highest education level: Not on file   Occupational History    Occupation: employed   Social Needs    Financial resource strain: Not on file    Food insecurity     Worry: Not on file     Inability: Not on file   [employer] needs     Medical: Not on file     Non-medical: Not on file   Tobacco Use    Smoking status: Former Smoker    Smokeless tobacco: Never Used   Substance and Sexual Activity    Alcohol use: Yes     Comment: social    Drug use: No    Sexual activity: Not on file   Lifestyle    Physical activity     Days per week: Not on file     Minutes per session: Not on file    Stress: Not on file   Relationships    Social connections     Talks on phone: Not on file     Gets together: Not on file     Attends [employer] service: Not on file     Active member of club or organization: Not on file     Attends meetings of clubs or organizations: Not on file     Relationship status: Not on file    Intimate partner violence     Fear of current or ex partner: Not on file     Emotionally abused: Not on file     Physically abused: Not on file     Forced sexual activity: Not on file   Other Topics Concern    Not on file   Social History Narrative    Always uses seatbelt    Lives with spouse    No caffeine use     History reviewed  No pertinent past medical history  Family History   Problem Relation Age of Onset    Heart attack Mother         MI    Heart attack Father         MI    Migraines Sister     Heart attack Brother         MI     History reviewed  No pertinent surgical history  Current Outpatient Medications:     ALPRAZolam (XANAX) 0 5 mg tablet, TAKE 1/2 TO 1 TABLET AT BEDTIME IF NEEDED SPARINGLY, Disp: 30 tablet, Rfl: 0    amLODIPine (NORVASC) 5 mg tablet, Take 1 tablet (5 mg total) by mouth daily, Disp: 30 tablet, Rfl: 3    busPIRone (BUSPAR) 15 mg tablet, TAKE 1 TABLET BY MOUTH 3 TIMES A DAY AS NEEDED FOR ANXIETY, Disp: 90 tablet, Rfl: 1    mometasone (ELOCON) 0 1 % cream, Apply topically daily To eyelids only   (Patient not taking: Reported on 1/18/2021), Disp: 45 g, Rfl: 0    ondansetron (ZOFRAN) 4 mg tablet, Take 1 tablet (4 mg total) by mouth every 8 (eight) hours as needed for nausea or vomiting (Patient not taking: Reported on 1/18/2021), Disp: 20 tablet, Rfl: 0    SUMAtriptan (IMITREX) 50 mg tablet, Take by mouth, Disp: , Rfl:     Allergies   Allergen Reactions    Meloxicam Throat Swelling    Skelaxin [Metaxalone] Throat Swelling          Lab Review   Lab on 12/07/2020   Component Date Value    OCCULT BLD, FECAL IMMUNO* 12/07/2020 Negative    Orders Only on 12/07/2020   Component Date Value    Cyclic Citrullin Peptide* 12/04/2020 5    Lab on 12/04/2020   Component Date Value    CATHERINE 12/04/2020 Negative     Rheumatoid Factor 12/04/2020 Positive*    CRP 12/04/2020 37 4*    Sodium 12/04/2020 139     Potassium 12/04/2020 4 2     Chloride 12/04/2020 107     CO2 12/04/2020 29     ANION GAP 12/04/2020 3*    BUN 12/04/2020 16     Creatinine 12/04/2020 1 22     Glucose 12/04/2020 96     Calcium 12/04/2020 9 6     AST 12/04/2020 20     ALT 12/04/2020 24     Alkaline Phosphatase 12/04/2020 72     Total Protein 12/04/2020 7 7     Albumin 12/04/2020 3 9     Total Bilirubin 12/04/2020 0 31     eGFR 12/04/2020 49     Hemoglobin A1C 12/04/2020 5 6     EAG 12/04/2020 114     FSH 12/04/2020 58 2     LH 12/04/2020 38 1     Cholesterol 12/04/2020 165     Triglycerides 12/04/2020 95     HDL, Direct 12/04/2020 51     LDL Calculated 12/04/2020 95     Non-HDL-Chol (CHOL-HDL) 12/04/2020 114     TSH 3RD GENERATON 12/04/2020 1 140     RF Quantitation 12/04/2020 20 IU/mL*        Imaging: No results found  Objective:     Physical Exam  Vitals signs and nursing note reviewed  Constitutional:       General: She is not in acute distress  Appearance: Normal appearance  She is well-developed  She is not ill-appearing, toxic-appearing or diaphoretic  HENT:      Head: Normocephalic and atraumatic  Right Ear: Tympanic membrane, ear canal and external ear normal  There is no impacted cerumen  Left Ear: Tympanic membrane, ear canal and external ear normal  There is no impacted cerumen  Nose: Nose normal  No congestion  Mouth/Throat:      Mouth: Mucous membranes are moist       Pharynx: No oropharyngeal exudate  Eyes:      General:         Right eye: No discharge  Left eye: No discharge  Extraocular Movements: Extraocular movements intact  Conjunctiva/sclera: Conjunctivae normal       Pupils: Pupils are equal, round, and reactive to light  Neck:      Musculoskeletal: Normal range of motion and neck supple  No neck rigidity  Cardiovascular:      Rate and Rhythm: Normal rate and regular rhythm  Heart sounds: No murmur  Comments: BP by me 144/86 and her machine 136/90  Pulmonary:      Effort: Pulmonary effort is normal  No respiratory distress  Breath sounds: Normal breath sounds  Musculoskeletal: Normal range of motion  General: No deformity  Right lower leg: No edema  Left lower leg: No edema  Skin:     General: Skin is warm  Capillary Refill: Capillary refill takes less than 2 seconds        Coloration: Skin is not pale  Findings: No erythema  Neurological:      General: No focal deficit present  Mental Status: She is alert and oriented to person, place, and time  Psychiatric:         Mood and Affect: Mood normal          Behavior: Behavior normal          Thought Content: Thought content normal          Judgment: Judgment normal            Patient Instructions     Reviewed all with patient  Her blood pressure machine matches our cuff pressure almost perfectly  Stay on the amlodipine for now but I suspect if he lost weight and cut out salt in the diet would be able to take you off this medication  Great job controlling her anxiety with only BuSpar at this time  Still okay to use just a small amount of Xanax sparingly for the days where the anxiety is very bed  Let me know if the joint pains get worse I will refer you to Rheumatology  MELECIO Garcia    Portions of the record may have been created with voice recognition software  Occasional wrong word or "sound a like" substitutions may have occurred due to the inherent limitations of voice recognition software  Read the chart carefully and recognize, using context, where substitutions have occurred

## 2021-01-18 NOTE — PATIENT INSTRUCTIONS
Reviewed all with patient  Her blood pressure machine matches our cuff pressure almost perfectly  Stay on the amlodipine for now but I suspect if he lost weight and cut out salt in the diet would be able to take you off this medication  Great job controlling her anxiety with only BuSpar at this time  Still okay to use just a small amount of Xanax sparingly for the days where the anxiety is very bed  Let me know if the joint pains get worse I will refer you to Rheumatology

## 2021-03-05 DIAGNOSIS — F41.9 ANXIETY: ICD-10-CM

## 2021-03-05 RX ORDER — BUSPIRONE HYDROCHLORIDE 15 MG/1
TABLET ORAL
Qty: 90 TABLET | Refills: 1 | Status: SHIPPED | OUTPATIENT
Start: 2021-03-05 | End: 2021-06-21 | Stop reason: SDUPTHER

## 2021-03-24 DIAGNOSIS — F41.8 DEPRESSION WITH ANXIETY: ICD-10-CM

## 2021-03-24 RX ORDER — ALPRAZOLAM 0.5 MG/1
TABLET ORAL
Qty: 30 TABLET | Refills: 0 | Status: SHIPPED | OUTPATIENT
Start: 2021-03-24 | End: 2021-06-21 | Stop reason: SDUPTHER

## 2021-05-05 ENCOUNTER — TELEPHONE (OUTPATIENT)
Dept: FAMILY MEDICINE CLINIC | Facility: CLINIC | Age: 61
End: 2021-05-05

## 2021-05-05 ENCOUNTER — APPOINTMENT (OUTPATIENT)
Dept: LAB | Facility: CLINIC | Age: 61
End: 2021-05-05
Payer: COMMERCIAL

## 2021-05-05 DIAGNOSIS — N39.0 URINARY TRACT INFECTION WITHOUT HEMATURIA, SITE UNSPECIFIED: ICD-10-CM

## 2021-05-05 DIAGNOSIS — N39.0 URINARY TRACT INFECTION WITHOUT HEMATURIA, SITE UNSPECIFIED: Primary | ICD-10-CM

## 2021-05-05 PROCEDURE — 87086 URINE CULTURE/COLONY COUNT: CPT

## 2021-05-05 PROCEDURE — 87077 CULTURE AEROBIC IDENTIFY: CPT

## 2021-05-05 PROCEDURE — 81001 URINALYSIS AUTO W/SCOPE: CPT | Performed by: FAMILY MEDICINE

## 2021-05-05 PROCEDURE — 87186 SC STD MICRODIL/AGAR DIL: CPT

## 2021-05-05 RX ORDER — NITROFURANTOIN 25; 75 MG/1; MG/1
100 CAPSULE ORAL 2 TIMES DAILY
Qty: 10 CAPSULE | Refills: 0 | Status: SHIPPED | OUTPATIENT
Start: 2021-05-05 | End: 2021-05-10

## 2021-05-05 NOTE — TELEPHONE ENCOUNTER
Patient called back and is aware  She is supposed to get her first covid vaccine on Saturday and wants to know if she is ok to take an antibiotic and get the shot?

## 2021-05-06 ENCOUNTER — TELEPHONE (OUTPATIENT)
Dept: FAMILY MEDICINE CLINIC | Facility: CLINIC | Age: 61
End: 2021-05-06

## 2021-05-06 LAB
BACTERIA UR QL AUTO: ABNORMAL /HPF
BILIRUB UR QL STRIP: NEGATIVE
CLARITY UR: ABNORMAL
COLOR UR: YELLOW
GLUCOSE UR STRIP-MCNC: NEGATIVE MG/DL
HGB UR QL STRIP.AUTO: ABNORMAL
KETONES UR STRIP-MCNC: NEGATIVE MG/DL
LEUKOCYTE ESTERASE UR QL STRIP: ABNORMAL
MUCOUS THREADS UR QL AUTO: ABNORMAL
NITRITE UR QL STRIP: NEGATIVE
NON-SQ EPI CELLS URNS QL MICRO: ABNORMAL /HPF
OTHER STN SPEC: ABNORMAL
PH UR STRIP.AUTO: 6.5 [PH]
PROT UR STRIP-MCNC: NEGATIVE MG/DL
RBC #/AREA URNS AUTO: ABNORMAL /HPF
SP GR UR STRIP.AUTO: 1.02 (ref 1–1.03)
UROBILINOGEN UR QL STRIP.AUTO: 0.2 E.U./DL
WBC #/AREA URNS AUTO: ABNORMAL /HPF

## 2021-05-07 LAB — BACTERIA UR CULT: ABNORMAL

## 2021-05-07 NOTE — TELEPHONE ENCOUNTER
She did start the antibiotic last night  She is getting the covid vaccine tomorrow and is worried about being on the antibiotic, buspar and BP meds  I assured her she could still get the vaccine

## 2021-05-08 ENCOUNTER — IMMUNIZATIONS (OUTPATIENT)
Dept: FAMILY MEDICINE CLINIC | Facility: HOSPITAL | Age: 61
End: 2021-05-08

## 2021-05-08 DIAGNOSIS — Z23 ENCOUNTER FOR IMMUNIZATION: Primary | ICD-10-CM

## 2021-05-08 PROCEDURE — 91300 SARS-COV-2 / COVID-19 MRNA VACCINE (PFIZER-BIONTECH) 30 MCG: CPT

## 2021-05-08 PROCEDURE — 0001A SARS-COV-2 / COVID-19 MRNA VACCINE (PFIZER-BIONTECH) 30 MCG: CPT

## 2021-05-13 ENCOUNTER — TELEPHONE (OUTPATIENT)
Dept: FAMILY MEDICINE CLINIC | Facility: CLINIC | Age: 61
End: 2021-05-13

## 2021-05-13 ENCOUNTER — RA CDI HCC (OUTPATIENT)
Dept: OTHER | Facility: HOSPITAL | Age: 61
End: 2021-05-13

## 2021-05-13 ENCOUNTER — APPOINTMENT (OUTPATIENT)
Dept: LAB | Facility: CLINIC | Age: 61
End: 2021-05-13
Payer: COMMERCIAL

## 2021-05-13 DIAGNOSIS — R30.0 DYSURIA: ICD-10-CM

## 2021-05-13 DIAGNOSIS — R30.0 DYSURIA: Primary | ICD-10-CM

## 2021-05-13 PROCEDURE — 87086 URINE CULTURE/COLONY COUNT: CPT

## 2021-05-13 PROCEDURE — 81001 URINALYSIS AUTO W/SCOPE: CPT | Performed by: FAMILY MEDICINE

## 2021-05-13 NOTE — PROGRESS NOTES
Re: Bridget Hays    Based on clinical documentation indicated in the medical record, it appears that the patient may have the following condition:    F31 9 - Bipolar depression    If this is correct, please document, assess, and code at your next visit on 5/20/2021    [facility] 75  coding opportunities             Chart reviewed, (number of) suggestions sent to provider: 1           Patients insurance company: [employer] (Medicare Advantage and Commercial)

## 2021-05-13 NOTE — TELEPHONE ENCOUNTER
Slip in emr for another ua and c&s  If she has another infection again already she will need to see urology  Drink 6 to 8 glasses of water daily  Urinate after sexual activity  No bubble baths, and wipe from front to back

## 2021-05-14 ENCOUNTER — TELEPHONE (OUTPATIENT)
Dept: FAMILY MEDICINE CLINIC | Facility: CLINIC | Age: 61
End: 2021-05-14

## 2021-05-14 DIAGNOSIS — N39.0 URINARY TRACT INFECTION WITHOUT HEMATURIA, SITE UNSPECIFIED: Primary | ICD-10-CM

## 2021-05-14 LAB
BACTERIA UR QL AUTO: ABNORMAL /HPF
BILIRUB UR QL STRIP: NEGATIVE
CLARITY UR: ABNORMAL
COLOR UR: YELLOW
GLUCOSE UR STRIP-MCNC: NEGATIVE MG/DL
HGB UR QL STRIP.AUTO: NEGATIVE
HYALINE CASTS #/AREA URNS LPF: ABNORMAL /LPF
KETONES UR STRIP-MCNC: NEGATIVE MG/DL
LEUKOCYTE ESTERASE UR QL STRIP: ABNORMAL
NITRITE UR QL STRIP: NEGATIVE
NON-SQ EPI CELLS URNS QL MICRO: ABNORMAL /HPF
PH UR STRIP.AUTO: 7 [PH]
PROT UR STRIP-MCNC: NEGATIVE MG/DL
RBC #/AREA URNS AUTO: ABNORMAL /HPF
SP GR UR STRIP.AUTO: 1.02 (ref 1–1.03)
UROBILINOGEN UR QL STRIP.AUTO: 0.2 E.U./DL
WBC #/AREA URNS AUTO: ABNORMAL /HPF

## 2021-05-14 RX ORDER — CIPROFLOXACIN 250 MG/1
250 TABLET, FILM COATED ORAL EVERY 12 HOURS SCHEDULED
Qty: 14 TABLET | Refills: 0 | Status: SHIPPED | OUTPATIENT
Start: 2021-05-14 | End: 2021-05-21

## 2021-05-14 NOTE — TELEPHONE ENCOUNTER
----- Message from [address] [place] [address]  sent at 5/14/2021  9:25 AM EDT -----  Tell her the culture is pending, but because it is so bad will start cipro now for 7 days and if not the correct abx will change when the culture comes back  Must drink 6 to 8 glasses of water daily, cranberry juice is helpful also

## 2021-05-15 LAB — BACTERIA UR CULT: NORMAL

## 2021-06-03 ENCOUNTER — IMMUNIZATIONS (OUTPATIENT)
Dept: FAMILY MEDICINE CLINIC | Facility: HOSPITAL | Age: 61
End: 2021-06-03

## 2021-06-03 DIAGNOSIS — Z23 ENCOUNTER FOR IMMUNIZATION: Primary | ICD-10-CM

## 2021-06-03 PROCEDURE — 91300 SARS-COV-2 / COVID-19 MRNA VACCINE (PFIZER-BIONTECH) 30 MCG: CPT

## 2021-06-03 PROCEDURE — 0002A SARS-COV-2 / COVID-19 MRNA VACCINE (PFIZER-BIONTECH) 30 MCG: CPT

## 2021-06-07 DIAGNOSIS — I10 BENIGN HYPERTENSION: ICD-10-CM

## 2021-06-07 RX ORDER — AMLODIPINE BESYLATE 5 MG/1
TABLET ORAL
Qty: 30 TABLET | Refills: 3 | Status: SHIPPED | OUTPATIENT
Start: 2021-06-07 | End: 2021-06-21 | Stop reason: ALTCHOICE

## 2021-06-14 ENCOUNTER — RA CDI HCC (OUTPATIENT)
Dept: OTHER | Facility: HOSPITAL | Age: 61
End: 2021-06-14

## 2021-06-14 NOTE — PROGRESS NOTES
[facility] 75  coding opportunities             Chart reviewed, (number of) suggestions sent to provider: 1     Problem listed updated  Provider Accepted, (number of) suggestions accepted: 0     Provider Rejected Suggestions for: F31 9            Patients insurance company: [employer] (Medicare Advantage and [employer])           Re: Rani Ybarra    Based on clinical documentation indicated in the medical record, it appears that the patient may have the following condition:    F31 9 - Bipolar depression    This condition was last documented, assessed, and coded at the [facility] office visit on 8/14/2020  If this is correct, please document, assess, and code at your next visit on 6/21/2021    [place] [id number]  coding opportunities             Chart reviewed, (number of) suggestions sent to provider: 1                  Patients insurance company: [employer] (Medicare Advantage and [employer])

## 2021-06-21 ENCOUNTER — OFFICE VISIT (OUTPATIENT)
Dept: FAMILY MEDICINE CLINIC | Facility: CLINIC | Age: 61
End: 2021-06-21
Payer: COMMERCIAL

## 2021-06-21 VITALS
HEART RATE: 98 BPM | SYSTOLIC BLOOD PRESSURE: 122 MMHG | DIASTOLIC BLOOD PRESSURE: 70 MMHG | TEMPERATURE: 98.2 F | BODY MASS INDEX: 27.46 KG/M2 | OXYGEN SATURATION: 97 % | WEIGHT: 155 LBS | HEIGHT: 63 IN

## 2021-06-21 DIAGNOSIS — F41.9 ANXIETY: Primary | ICD-10-CM

## 2021-06-21 DIAGNOSIS — F41.8 DEPRESSION WITH ANXIETY: ICD-10-CM

## 2021-06-21 DIAGNOSIS — M72.2 BILATERAL PLANTAR FASCIITIS: ICD-10-CM

## 2021-06-21 DIAGNOSIS — I10 BENIGN HYPERTENSION: ICD-10-CM

## 2021-06-21 DIAGNOSIS — Z13.220 SCREENING, LIPID: ICD-10-CM

## 2021-06-21 PROBLEM — F31.9 BIPOLAR DEPRESSION (HCC): Status: RESOLVED | Noted: 2020-03-10 | Resolved: 2021-06-21

## 2021-06-21 PROCEDURE — 99214 OFFICE O/P EST MOD 30 MIN: CPT | Performed by: FAMILY MEDICINE

## 2021-06-21 RX ORDER — BUSPIRONE HYDROCHLORIDE 15 MG/1
TABLET ORAL
Qty: 90 TABLET | Refills: 1 | Status: SHIPPED | OUTPATIENT
Start: 2021-06-21 | End: 2021-10-14

## 2021-06-21 NOTE — PROGRESS NOTES
Assessment/Plan:     Chronic Problems:  Benign hypertension  Blood pressure today is perfect and patient has been off her blood pressure medicine for days  Advised to work on weight loss no added salt in the diet check the blood pressure home once daily in the morning write down the numbers and call here in 2-3 weeks with numbers  If most of those numbers are in the 130s to 140s over 80s I am restarting her on medication  Depression with anxiety  Depression is better but still with anxiety on occasion  Use the buspar 1/2 to 1 po tid prn anxiety  Visit Diagnosis:  Diagnoses and all orders for this visit:    Anxiety  -     busPIRone (BUSPAR) 15 mg tablet; TAKE 1 TABLET BY MOUTH THREE TIMES A DAY AS NEEDED FOR ANXIETY    BMI 27 0-27 9,adult    Benign hypertension  -     Microalbumin / creatinine urine ratio  -     Urinalysis with microscopic    Depression with anxiety    Screening, lipid  -     Comprehensive metabolic panel; Future  -     Lipid panel; Future    Bilateral plantar fasciitis  Comments:  Patient followed with Dr Minnie Mejias and was not happy  Advised she can try the exercises given or have a shot by Dr Minnie Mejias  Subjective:    Patient ID: Zeny Multani is a 63 y o  female  Pt is here for routine f/u appt  Pt is concerned that she is fat  Also ran out of buspar for the last 3 days  Feels she was nastier than usual  Not sure if the buspar helps the nastiness  Has been off her psych meds for almost a year  Doing well  Missed her bp meds and her bp was 126/80 off meds  Feels her life is a lot less stressful  Takes other meds as directed  No side effects noted  The following portions of the patient's history were reviewed and updated as appropriate: allergies, current medications, past family history, past medical history, past social history, past surgical history and problem list     Review of Systems   Constitutional: Positive for diaphoresis (at night and during the day  )  Negative for chills, fatigue and fever  HENT: Negative  Eyes: Negative  Respiratory: Negative for cough, shortness of breath and wheezing  Cardiovascular: Negative for chest pain and palpitations  Gastrointestinal: Negative for abdominal pain, constipation, diarrhea, nausea and vomiting  Genitourinary: Negative  FDLMP - over a year ago  Patient never took the medication for the last UTI as her symptoms had resolved  Neurological: Negative for dizziness, light-headedness and headaches  Psychiatric/Behavioral: Negative for dysphoric mood  The patient is nervous/anxious  Still sad and thinks about her  all the time  Living with a "great herb, just not Junito"  /70 (BP Location: Left arm, Patient Position: Sitting)   Pulse 98   Temp 98 2 °F (36 8 °C) (Tympanic)   Ht 5' 3" (1 6 m)   Wt 70 3 kg (155 lb)   SpO2 97%   BMI 27 46 kg/m²   Social History     Socioeconomic History    Marital status: /Civil Union     Spouse name: Not on file    Number of children: Not on file    Years of education: Not on file    Highest education level: Not on file   Occupational History    Occupation: employed   Tobacco Use    Smoking status: Former Smoker    Smokeless tobacco: Never Used   Substance and Sexual Activity    Alcohol use: Yes     Comment: social    Drug use: No    Sexual activity: Not on file   Other Topics Concern    Not on file   Social History Narrative    Always uses seatbelt    Lives with spouse    No caffeine use     Social Determinants of Health     Financial Resource Strain:     Difficulty of Paying Living Expenses:    Food Insecurity:     Worried About Running Out of Food in the Last Year:     [address] in the Last Year:    Transportation Needs:     Lack of Transportation (Medical):      Lack of Transportation (Non-Medical):    Physical Activity:     Days of Exercise per Week:     Minutes of Exercise per Session:    Stress:     Feeling of Stress :    Social Connections:     Frequency of Communication with Friends and Family:     Frequency of Social Gatherings with Friends and Family:     Attends Holiness Services:     Active Member of Clubs or Organizations:     Attends Club or Organization Meetings:     Marital Status:    Intimate Partner Violence:     Fear of Current or Ex-Partner:     Emotionally Abused:     Physically Abused:     Sexually Abused:      History reviewed  No pertinent past medical history  Family History   Problem Relation Age of Onset    Heart attack Mother         MI    Heart attack Father         MI    Migraines Sister     Heart attack Brother         MI     History reviewed  No pertinent surgical history  Current Outpatient Medications:     ALPRAZolam (XANAX) 0 5 mg tablet, TAKE 1/2 TO 1 TABLET AT BEDTIME IF NEEDED SPARINGLY, Disp: 30 tablet, Rfl: 0    busPIRone (BUSPAR) 15 mg tablet, TAKE 1 TABLET BY MOUTH THREE TIMES A DAY AS NEEDED FOR ANXIETY, Disp: 90 tablet, Rfl: 1    mometasone (ELOCON) 0 1 % cream, Apply topically daily To eyelids only   (Patient not taking: Reported on 1/18/2021), Disp: 45 g, Rfl: 0    ondansetron (ZOFRAN) 4 mg tablet, Take 1 tablet (4 mg total) by mouth every 8 (eight) hours as needed for nausea or vomiting (Patient not taking: Reported on 1/18/2021), Disp: 20 tablet, Rfl: 0    SUMAtriptan (IMITREX) 50 mg tablet, Take by mouth (Patient not taking: Reported on 6/21/2021), Disp: , Rfl:     Allergies   Allergen Reactions    Meloxicam Throat Swelling    Skelaxin [Metaxalone] Throat Swelling          Lab Review   Appointment on 05/13/2021   Component Date Value    Urine Culture 05/13/2021 10,000-19,000 cfu/ml     Orders Only on 05/13/2021   Component Date Value    Clarity, UA 05/13/2021 Cloudy     Color, UA 05/13/2021 Yellow     Specific Gravity, UA 05/13/2021 1 024     pH, UA 05/13/2021 7 0     Glucose, UA 05/13/2021 Negative     Ketones, UA 05/13/2021 Negative  Blood, UA 05/13/2021 Negative     Protein, UA 05/13/2021 Negative     Nitrite, UA 05/13/2021 Negative     Bilirubin, UA 05/13/2021 Negative     Urobilinogen, UA 05/13/2021 0 2     Leukocytes, UA 05/13/2021 Trace*    WBC, UA 05/13/2021 10-20*    RBC, UA 05/13/2021 None Seen     Hyaline Casts, UA 05/13/2021 5-10*    Bacteria, UA 05/13/2021 Occasional     Epithelial Cells 05/13/2021 Moderate*   Appointment on 05/05/2021   Component Date Value    Urine Culture 05/05/2021 40,000-49,000 cfu/ml Escherichia coli*   Orders Only on 05/05/2021   Component Date Value    Clarity, UA 05/05/2021 Cloudy     Color, UA 05/05/2021 Yellow     Specific Gravity, UA 05/05/2021 1 018     pH, UA 05/05/2021 6 5     Glucose, UA 05/05/2021 Negative     Ketones, UA 05/05/2021 Negative     Blood, UA 05/05/2021 Large*    Protein, UA 05/05/2021 Negative     Nitrite, UA 05/05/2021 Negative     Bilirubin, UA 05/05/2021 Negative     Urobilinogen, UA 05/05/2021 0 2     Leukocytes, UA 05/05/2021 Large*    WBC, UA 05/05/2021 Innumerable*    RBC, UA 05/05/2021 None Seen     Bacteria, UA 05/05/2021 Moderate*    OTHER OBSERVATIONS 05/05/2021 Yeast Cells Present     Epithelial Cells 05/05/2021 Moderate*    MUCUS THREADS 05/05/2021 Occasional*        Imaging: No results found  Objective:     Physical Exam      Patient Instructions       Reviewed all with patient  Blood pressure today is perfect so hold the amlodipine  I do suggest that you check your blood pressure once a day in the morning write down the numbers and call here in 2-3 weeks with numbers  If your numbers are greater than 140s or high 80s I am starting you back on amlodipine  Work on weight loss cutting back on carbohydrates and no added salt in the diet  You have done great with the depression you are sad about losing her  I understand that  If you think counseling would help I could set you up here for counseling    Use the BuSpar only if you have anxiety  You could try half a pill up to 3 times a day or a whole pill as needed but no more than 3 times daily  Have the blood work done fasting before the next appointment in 6 months and remember to call here with the blood pressures from home  Once again advised patient to freeze a little bottle of water and roll her foot over it a million times a day  Heart Healthy Diet   AMBULATORY CARE:   A heart healthy diet  is an eating plan low in unhealthy fats and sodium (salt)  The plan is high in healthy fats and fiber  A heart healthy diet helps improve your cholesterol levels and lowers your risk for heart disease and stroke  A dietitian will teach you how to read and understand food labels  Heart healthy diet guidelines to follow:   · Choose foods that contain healthy fats  ? Unsaturated fats  include monounsaturated and polyunsaturated fats  Unsaturated fat is found in foods such as soybean, canola, olive, corn, and safflower oils  It is also found in soft tub margarine that is made with liquid vegetable oil  ? Omega-3 fat  is found in certain fish, such as salmon, tuna, and trout, and in walnuts and flaxseed  Eat fish high in omega-3 fats at least 2 times a week  · Get 20 to 30 grams of fiber each day  Fruits, vegetables, whole-grain foods, and legumes (cooked beans) are good sources of fiber  · Limit or do not have unhealthy fats  ? Cholesterol  is found in animal foods, such as eggs and lobster, and in dairy products made from whole milk  Limit cholesterol to less than 200 mg each day  ? Saturated fat  is found in meats, such as young and hamburger  It is also found in chicken or turkey skin, whole milk, and butter  Limit saturated fat to less than 7% of your total daily calories  ? Trans fat  is found in packaged foods, such as potato chips and cookies  It is also in hard margarine, some fried foods, and shortening   Do not eat foods that contain trans fats     · Limit sodium as directed  You may be told to limit sodium to 2,000 to 2,300 mg each day  Choose low-sodium or no-salt-added foods  Add little or no salt to food you prepare  Use herbs and spices in place of salt  Include the following in your heart healthy plan:  Ask your dietitian or healthcare provider how many servings to have from each of the following food groups:  · Grains:      ? Whole-wheat breads, cereals, and pastas, and brown rice    ? Low-fat, low-sodium crackers and chips    · Vegetables:      ? Broccoli, green beans, green peas, and spinach    ? Collards, kale, and lima beans    ? Carrots, sweet potatoes, tomatoes, and peppers    ? Canned vegetables with no salt added    · Fruits:      ? Bananas, peaches, pears, and pineapple    ? Grapes, raisins, and dates    ? Oranges, tangerines, grapefruit, orange juice, and grapefruit juice    ? Apricots, mangoes, melons, and papaya    ? Raspberries and strawberries    ? Canned fruit with no added sugar    · Low-fat dairy:      ? Nonfat (skim) milk, 1% milk, and low-fat almond, cashew, or soy milks fortified with calcium    ? Low-fat cheese, regular or frozen yogurt, and cottage cheese    · Meats and proteins:      ? Lean cuts of beef and pork (loin, leg, round), skinless chicken and turkey    ? Legumes, soy products, egg whites, or nuts    Limit or do not include the following in your heart healthy plan:   · Unhealthy fats and oils:      ? Whole or 2% milk, cream cheese, sour cream, or cheese    ? High-fat cuts of beef (T-bone steaks, ribs), chicken or turkey with skin, and organ meats such as liver    ? Butter, stick margarine, shortening, and cooking oils such as coconut or palm oil    · Foods and liquids high in sodium:      ? Packaged foods, such as frozen dinners, cookies, macaroni and cheese, and cereals with more than 300 mg of sodium per serving    ?  Vegetables with added sodium, such as instant potatoes, vegetables with added sauces, or regular canned vegetables    ? Cured or smoked meats, such as hot dogs, young, and sausage    ? High-sodium ketchup, barbecue sauce, salad dressing, pickles, olives, soy sauce, or miso    · Foods and liquids high in sugar:      ? Candy, cake, cookies, pies, or doughnuts    ? Soft drinks (soda), sports drinks, or sweetened tea    ? Canned or dry mixes for cakes, soups, sauces, or gravies    Other healthy heart guidelines:   · Do not smoke  Nicotine and other chemicals in cigarettes and cigars can cause lung and heart damage  Ask your healthcare provider for information if you currently smoke and need help to quit  E-cigarettes or smokeless tobacco still contain nicotine  Talk to your healthcare provider before you use these products  · Limit or do not drink alcohol as directed  Alcohol can damage your heart and raise your blood pressure  Your healthcare provider may give you specific daily and weekly limits  The general recommended limit is 1 drink a day for women 21 or older and for men 72 or older  Do not have more than 3 drinks in a day or 7 in a week  The recommended limit is 2 drinks a day for men 24to 59years of age  Do not have more than 4 drinks in a day or 14 in a week  A drink of alcohol is 12 ounces of beer, 5 ounces of wine, or 1½ ounces of liquor  · Exercise regularly  Exercise can help you maintain a healthy weight and improve your blood pressure and cholesterol levels  Regular exercise can also decrease your risk for heart problems  Ask your healthcare provider about the best exercise plan for you  Do not start an exercise program without asking your healthcare provider  Follow up with your doctor or cardiologist as directed:  Write down your questions so you remember to ask them during your visits  © Copyright [address] Information is for End User's use only and may not be sold, redistributed or otherwise used for commercial purposes   All illustrations and images included in [facility] are the copyrighted property of [employer]  or [address] [place] [address]  The above information is an  only  It is not intended as medical advice for individual conditions or treatments  Talk to your doctor, nurse or pharmacist before following any medical regimen to see if it is safe and effective for you  MELECIO Garcia    Portions of the record may have been created with voice recognition software  Occasional wrong word or "sound a like" substitutions may have occurred due to the inherent limitations of voice recognition software  Read the chart carefully and recognize, using context, where substitutions have occurred  BMI Counseling: Body mass index is 27 46 kg/m²  The BMI is above normal  Nutrition recommendations include reducing portion sizes, decreasing soda and/or juice intake and moderation in carbohydrate intake  Exercise recommendations include moderate aerobic physical activity for 150 minutes/week

## 2021-06-21 NOTE — ASSESSMENT & PLAN NOTE
Depression is better but still with anxiety on occasion  Use the buspar 1/2 to 1 po tid prn anxiety

## 2021-06-21 NOTE — ASSESSMENT & PLAN NOTE
Blood pressure today is perfect and patient has been off her blood pressure medicine for days  Advised to work on weight loss no added salt in the diet check the blood pressure home once daily in the morning write down the numbers and call here in 2-3 weeks with numbers  If most of those numbers are in the 130s to 140s over 80s I am restarting her on medication

## 2021-06-21 NOTE — PATIENT INSTRUCTIONS
Reviewed all with patient  Blood pressure today is perfect so hold the amlodipine  I do suggest that you check your blood pressure once a day in the morning write down the numbers and call here in 2-3 weeks with numbers  If your numbers are greater than 140s or high 80s I am starting you back on amlodipine  Work on weight loss cutting back on carbohydrates and no added salt in the diet  You have done great with the depression you are sad about losing her  I understand that  If you think counseling would help I could set you up here for counseling  Use the BuSpar only if you have anxiety  You could try half a pill up to 3 times a day or a whole pill as needed but no more than 3 times daily  Have the blood work done fasting before the next appointment in 6 months and remember to call here with the blood pressures from home  Once again advised patient to freeze a little bottle of water and roll her foot over it a million times a day  Heart Healthy Diet   AMBULATORY CARE:   A heart healthy diet  is an eating plan low in unhealthy fats and sodium (salt)  The plan is high in healthy fats and fiber  A heart healthy diet helps improve your cholesterol levels and lowers your risk for heart disease and stroke  A dietitian will teach you how to read and understand food labels  Heart healthy diet guidelines to follow:   · Choose foods that contain healthy fats  ? Unsaturated fats  include monounsaturated and polyunsaturated fats  Unsaturated fat is found in foods such as soybean, canola, olive, corn, and safflower oils  It is also found in soft tub margarine that is made with liquid vegetable oil  ? Omega-3 fat  is found in certain fish, such as salmon, tuna, and trout, and in walnuts and flaxseed  Eat fish high in omega-3 fats at least 2 times a week  · Get 20 to 30 grams of fiber each day  Fruits, vegetables, whole-grain foods, and legumes (cooked beans) are good sources of fiber  · Limit or do not have unhealthy fats  ? Cholesterol  is found in animal foods, such as eggs and lobster, and in dairy products made from whole milk  Limit cholesterol to less than 200 mg each day  ? Saturated fat  is found in meats, such as young and hamburger  It is also found in chicken or turkey skin, whole milk, and butter  Limit saturated fat to less than 7% of your total daily calories  ? Trans fat  is found in packaged foods, such as potato chips and cookies  It is also in hard margarine, some fried foods, and shortening  Do not eat foods that contain trans fats  · Limit sodium as directed  You may be told to limit sodium to 2,000 to 2,300 mg each day  Choose low-sodium or no-salt-added foods  Add little or no salt to food you prepare  Use herbs and spices in place of salt  Include the following in your heart healthy plan:  Ask your dietitian or healthcare provider how many servings to have from each of the following food groups:  · Grains:      ? Whole-wheat breads, cereals, and pastas, and brown rice    ? Low-fat, low-sodium crackers and chips    · Vegetables:      ? Broccoli, green beans, green peas, and spinach    ? Collards, kale, and lima beans    ? Carrots, sweet potatoes, tomatoes, and peppers    ? Canned vegetables with no salt added    · Fruits:      ? Bananas, peaches, pears, and pineapple    ? Grapes, raisins, and dates    ? Oranges, tangerines, grapefruit, orange juice, and grapefruit juice    ? Apricots, mangoes, melons, and papaya    ? Raspberries and strawberries    ? Canned fruit with no added sugar    · Low-fat dairy:      ? Nonfat (skim) milk, 1% milk, and low-fat almond, cashew, or soy milks fortified with calcium    ? Low-fat cheese, regular or frozen yogurt, and cottage cheese    · Meats and proteins:      ? Lean cuts of beef and pork (loin, leg, round), skinless chicken and turkey    ?  Legumes, soy products, egg whites, or nuts    Limit or do not include the following in your heart healthy plan:   · Unhealthy fats and oils:      ? Whole or 2% milk, cream cheese, sour cream, or cheese    ? High-fat cuts of beef (T-bone steaks, ribs), chicken or turkey with skin, and organ meats such as liver    ? Butter, stick margarine, shortening, and cooking oils such as coconut or palm oil    · Foods and liquids high in sodium:      ? Packaged foods, such as frozen dinners, cookies, macaroni and cheese, and cereals with more than 300 mg of sodium per serving    ? Vegetables with added sodium, such as instant potatoes, vegetables with added sauces, or regular canned vegetables    ? Cured or smoked meats, such as hot dogs, young, and sausage    ? High-sodium ketchup, barbecue sauce, salad dressing, pickles, olives, soy sauce, or miso    · Foods and liquids high in sugar:      ? Candy, cake, cookies, pies, or doughnuts    ? Soft drinks (soda), sports drinks, or sweetened tea    ? Canned or dry mixes for cakes, soups, sauces, or gravies    Other healthy heart guidelines:   · Do not smoke  Nicotine and other chemicals in cigarettes and cigars can cause lung and heart damage  Ask your healthcare provider for information if you currently smoke and need help to quit  E-cigarettes or smokeless tobacco still contain nicotine  Talk to your healthcare provider before you use these products  · Limit or do not drink alcohol as directed  Alcohol can damage your heart and raise your blood pressure  Your healthcare provider may give you specific daily and weekly limits  The general recommended limit is 1 drink a day for women 21 or older and for men 72 or older  Do not have more than 3 drinks in a day or 7 in a week  The recommended limit is 2 drinks a day for men 24to 59years of age  Do not have more than 4 drinks in a day or 14 in a week  A drink of alcohol is 12 ounces of beer, 5 ounces of wine, or 1½ ounces of liquor  · Exercise regularly    Exercise can help you maintain a healthy weight and improve your blood pressure and cholesterol levels  Regular exercise can also decrease your risk for heart problems  Ask your healthcare provider about the best exercise plan for you  Do not start an exercise program without asking your healthcare provider  Follow up with your doctor or cardiologist as directed:  Write down your questions so you remember to ask them during your visits  © Copyright [address] Information is for End User's use only and may not be sold, redistributed or otherwise used for commercial purposes  All illustrations and images included in [employer]® are the copyrighted property of [employer]  or [address] [place], [place] [address]   The above information is an  only  It is not intended as medical advice for individual conditions or treatments  Talk to your doctor, nurse or pharmacist before following any medical regimen to see if it is safe and effective for you

## 2021-07-09 ENCOUNTER — OFFICE VISIT (OUTPATIENT)
Dept: FAMILY MEDICINE CLINIC | Facility: CLINIC | Age: 61
End: 2021-07-09
Payer: COMMERCIAL

## 2021-07-09 VITALS
SYSTOLIC BLOOD PRESSURE: 130 MMHG | BODY MASS INDEX: 27.43 KG/M2 | HEART RATE: 74 BPM | WEIGHT: 154.8 LBS | HEIGHT: 63 IN | OXYGEN SATURATION: 97 % | TEMPERATURE: 98.1 F | DIASTOLIC BLOOD PRESSURE: 82 MMHG

## 2021-07-09 DIAGNOSIS — M72.2 PLANTAR FASCIITIS: ICD-10-CM

## 2021-07-09 DIAGNOSIS — I10 BENIGN HYPERTENSION: ICD-10-CM

## 2021-07-09 DIAGNOSIS — K21.00 GASTROESOPHAGEAL REFLUX DISEASE WITH ESOPHAGITIS WITHOUT HEMORRHAGE: Primary | ICD-10-CM

## 2021-07-09 PROCEDURE — 3075F SYST BP GE 130 - 139MM HG: CPT | Performed by: FAMILY MEDICINE

## 2021-07-09 PROCEDURE — 1036F TOBACCO NON-USER: CPT | Performed by: FAMILY MEDICINE

## 2021-07-09 PROCEDURE — 3079F DIAST BP 80-89 MM HG: CPT | Performed by: FAMILY MEDICINE

## 2021-07-09 PROCEDURE — 99214 OFFICE O/P EST MOD 30 MIN: CPT | Performed by: FAMILY MEDICINE

## 2021-07-09 PROCEDURE — 3008F BODY MASS INDEX DOCD: CPT | Performed by: FAMILY MEDICINE

## 2021-07-09 RX ORDER — PANTOPRAZOLE SODIUM 40 MG/1
TABLET, DELAYED RELEASE ORAL
Qty: 37 TABLET | Refills: 0 | Status: SHIPPED | OUTPATIENT
Start: 2021-07-09 | End: 2021-07-23 | Stop reason: SDUPTHER

## 2021-07-09 RX ORDER — AMLODIPINE BESYLATE 5 MG/1
5 TABLET ORAL DAILY
Qty: 90 TABLET | Refills: 0 | Status: SHIPPED | OUTPATIENT
Start: 2021-07-09 | End: 2021-10-15

## 2021-07-09 NOTE — PATIENT INSTRUCTIONS
Reviewed all with patient  I strongly suggest she cut back on the beer considerably as it has given her a beer belly and weight gain  Obviously your blood pressures from home are 2 elevated so restart your amlodipine  Check the blood pressures at home daily record the numbers and bring to follow-up here in 2 weeks  No added salt at home  For the reflux esophagitis that your having I want you to take pantoprazole twice a day before meals breakfast and dinner for the 1st week and then just once daily before breakfast   Really need to cut back on that alcohol and follow-up here in 2 weeks  For the plantar fasciitis I would like you to a freeze a water bottle and roll your foot over the water bottle a million times a day

## 2021-07-09 NOTE — PROGRESS NOTES
Assessment/Plan:     Chronic Problems:  Benign hypertension    Home blood pressures are too elevated  Restart the amlodipine  Check the blood pressures home on the amlodipine daily every morning  No added salt in the diet and follow-up here in 2 weeks  Visit Diagnosis:  Diagnoses and all orders for this visit:    Gastroesophageal reflux disease with esophagitis without hemorrhage  Comments: Will start pantoprazole bid x 7 days and then qd  Advised to f/u in 2 weeks  Avoid alcohol  [place] foods  Orders:  -     pantoprazole (PROTONIX) 40 mg tablet; Take 1 po bid x 7 days then 1 daily    Plantar fasciitis  Comments:  Pt given instructions again for exercises for plantar fasciitis  Benign hypertension  -     amLODIPine (NORVASC) 5 mg tablet; Take 1 tablet (5 mg total) by mouth daily          Subjective:    Patient ID: Sabine Bolton is a 63 y o  female  Pt is here with c/o drinking heavily on the   Drank for 15 hours, all day  The next day, she could only eat a BLT  The following day she went to work and now has to clear her throat constantly  Had some epigastric pain and took a pepcid with relief  Thinks she may have heartburn  Feels she is so fat she can feel the pressure but no belly pain  Was freaking out and took a xanax and no longer had to clear her throat  Unable to eat since then  Tried to bake breaded fish and ate it and then started clearing her throat again  Takes all other meds as directed  No side effects noted  Pt is drinking heavily since her   3 years ago  Has at least 3 to 6 drinks daily  Drinks mostly beer, but also had LEONEL and coke  Stopped her bp meds  Patient has a list of her blood pressures from home  Numbers range from a low of 134/100 to a max of 147/89        The following portions of the patient's history were reviewed and updated as appropriate: allergies, current medications, past family history, past medical history, past social history, past surgical history and problem list     Review of Systems   Constitutional: Negative for chills, diaphoresis, fatigue and fever  HENT: Negative  Eyes: Negative  Respiratory: Negative for cough, shortness of breath and wheezing  Cardiovascular: Negative for chest pain and palpitations  Gastrointestinal: Negative for abdominal pain, constipation, diarrhea, nausea and vomiting  Pt with c/o epigastric pain  Has not drank anything since the 4th of July  + heartburn and reflux  Endocrine: Positive for heat intolerance  Genitourinary: Negative  Musculoskeletal:        Still with pain in her feet in the mornings that is intolerable  Seen by podiatrist   Given inserts in her shoes but told she had plantar fasciitis  Previously was given exercises for plantar fasciitis   Neurological: Negative for dizziness, light-headedness and headaches  Psychiatric/Behavioral: Negative for dysphoric mood  The patient is not nervous/anxious            /82 (BP Location: Right arm, Patient Position: Sitting)   Pulse 74   Temp 98 1 °F (36 7 °C) (Tympanic)   Ht 5' 3" (1 6 m)   Wt 70 2 kg (154 lb 12 8 oz)   SpO2 97%   BMI 27 42 kg/m²   Social History     Socioeconomic History    Marital status: /Civil Union     Spouse name: Not on file    Number of children: Not on file    Years of education: Not on file    Highest education level: Not on file   Occupational History    Occupation: employed   Tobacco Use    Smoking status: Former Smoker    Smokeless tobacco: Never Used   Substance and Sexual Activity    Alcohol use: Yes     Comment: social    Drug use: No    Sexual activity: Not on file   Other Topics Concern    Not on file   Social History Narrative    Always uses seatbelt    Lives with spouse    No caffeine use     Social Determinants of Health     Financial Resource Strain:     Difficulty of Paying Living Expenses:    Food Insecurity:     Worried About Running Out of Food in the Last Year:     Ran Out of Food in the Last Year:    Transportation Needs:     Lack of Transportation (Medical):  Lack of Transportation (Non-Medical):    Physical Activity:     Days of Exercise per Week:     Minutes of Exercise per Session:    Stress:     Feeling of Stress :    Social Connections:     Frequency of Communication with Friends and Family:     Frequency of Social Gatherings with Friends and Family:     Attends Muslim Services:     Active Member of Clubs or Organizations:     Attends Club or Organization Meetings:     Marital Status:    Intimate Partner Violence:     Fear of Current or Ex-Partner:     Emotionally Abused:     Physically Abused:     Sexually Abused:      History reviewed  No pertinent past medical history  Family History   Problem Relation Age of Onset    Heart attack Mother         MI    Heart attack Father         MI    Migraines Sister     Heart attack Brother         MI     History reviewed  No pertinent surgical history  Current Outpatient Medications:     ALPRAZolam (XANAX) 0 5 mg tablet, Take 1 po prior to plane rides  , Disp: 10 tablet, Rfl: 0    busPIRone (BUSPAR) 15 mg tablet, TAKE 1 TABLET BY MOUTH THREE TIMES A DAY AS NEEDED FOR ANXIETY, Disp: 90 tablet, Rfl: 1    amLODIPine (NORVASC) 5 mg tablet, Take 1 tablet (5 mg total) by mouth daily, Disp: 90 tablet, Rfl: 0    mometasone (ELOCON) 0 1 % cream, Apply topically daily To eyelids only   (Patient not taking: Reported on 1/18/2021), Disp: 45 g, Rfl: 0    ondansetron (ZOFRAN) 4 mg tablet, Take 1 tablet (4 mg total) by mouth every 8 (eight) hours as needed for nausea or vomiting (Patient not taking: Reported on 1/18/2021), Disp: 20 tablet, Rfl: 0    pantoprazole (PROTONIX) 40 mg tablet, Take 1 po bid x 7 days then 1 daily, Disp: 37 tablet, Rfl: 0    SUMAtriptan (IMITREX) 50 mg tablet, Take by mouth (Patient not taking: Reported on 6/21/2021), Disp: , Rfl:     Allergies   Allergen Reactions    Meloxicam Throat Swelling    Skelaxin [Metaxalone] Throat Swelling          Lab Review   Appointment on 05/13/2021   Component Date Value    Urine Culture 05/13/2021 10,000-19,000 cfu/ml     Orders Only on 05/13/2021   Component Date Value    Clarity, UA 05/13/2021 Cloudy     Color, UA 05/13/2021 Yellow     Specific Gravity, UA 05/13/2021 1 024     pH, UA 05/13/2021 7 0     Glucose, UA 05/13/2021 Negative     Ketones, UA 05/13/2021 Negative     Blood, UA 05/13/2021 Negative     Protein, UA 05/13/2021 Negative     Nitrite, UA 05/13/2021 Negative     Bilirubin, UA 05/13/2021 Negative     Urobilinogen, UA 05/13/2021 0 2     Leukocytes, UA 05/13/2021 Trace*    WBC, UA 05/13/2021 10-20*    RBC, UA 05/13/2021 None Seen     Hyaline Casts, UA 05/13/2021 5-10*    Bacteria, UA 05/13/2021 Occasional     Epithelial Cells 05/13/2021 Moderate*        Imaging: No results found  Objective:     Physical Exam  Vitals and nursing note reviewed  Constitutional:       General: She is not in acute distress  Appearance: Normal appearance  She is well-developed  She is not ill-appearing, toxic-appearing or diaphoretic  HENT:      Head: Normocephalic and atraumatic  Right Ear: Tympanic membrane, ear canal and external ear normal  There is no impacted cerumen  Left Ear: Tympanic membrane, ear canal and external ear normal  There is no impacted cerumen  Nose: Nose normal  No congestion  Mouth/Throat:      Mouth: Mucous membranes are moist       Pharynx: No oropharyngeal exudate  Eyes:      General:         Right eye: No discharge  Left eye: No discharge  Extraocular Movements: Extraocular movements intact  Conjunctiva/sclera: Conjunctivae normal       Pupils: Pupils are equal, round, and reactive to light  Cardiovascular:      Rate and Rhythm: Normal rate and regular rhythm  Heart sounds: No murmur heard       Pulmonary:      Effort: Pulmonary effort is normal  No respiratory distress  Breath sounds: Normal breath sounds  Abdominal:      General: Abdomen is flat  Bowel sounds are normal       Palpations: Abdomen is soft  Tenderness: There is no abdominal tenderness  Musculoskeletal:         General: No deformity  Normal range of motion  Cervical back: Normal range of motion and neck supple  No rigidity  Right lower leg: No edema  Left lower leg: No edema  Skin:     General: Skin is warm  Capillary Refill: Capillary refill takes less than 2 seconds  Coloration: Skin is not pale  Findings: No erythema  Neurological:      General: No focal deficit present  Mental Status: She is alert and oriented to person, place, and time  Psychiatric:         Mood and Affect: Mood normal          Behavior: Behavior normal          Thought Content: Thought content normal          Judgment: Judgment normal            Patient Instructions     Reviewed all with patient  I strongly suggest she cut back on the beer considerably as it has given her a beer belly and weight gain  Obviously your blood pressures from home are 2 elevated so restart your amlodipine  Check the blood pressures at home daily record the numbers and bring to follow-up here in 2 weeks  No added salt at home  For the reflux esophagitis that your having I want you to take pantoprazole twice a day before meals breakfast and dinner for the 1st week and then just once daily before breakfast   Really need to cut back on that alcohol and follow-up here in 2 weeks  For the plantar fasciitis I would like you to a freeze a water bottle and roll your foot over the water bottle a million times a day  MELECIO Garcia    Portions of the record may have been created with voice recognition software  Occasional wrong word or "sound a like" substitutions may have occurred due to the inherent limitations of voice recognition software    Read the chart carefully and recognize, using context, where substitutions have occurred

## 2021-07-09 NOTE — ASSESSMENT & PLAN NOTE
Home blood pressures are too elevated  Restart the amlodipine  Check the blood pressures home on the amlodipine daily every morning  No added salt in the diet and follow-up here in 2 weeks

## 2021-07-15 ENCOUNTER — RA CDI HCC (OUTPATIENT)
Dept: OTHER | Facility: HOSPITAL | Age: 61
End: 2021-07-15

## 2021-07-15 NOTE — PROGRESS NOTES
[facility]  coding opportunities             Chart reviewed, (number of) suggestions sent to provider: 1     Problem listed updated  Provider Accepted, (number of) suggestions accepted: 0     Provider Rejected Suggestions for: F31 9            Patients insurance company: [employer] (Medicare Advantage and [employer])           Re: Melba Tucker    Based on clinical documentation indicated in the medical record, it appears that the patient may have the following condition:    F31 9 - Bipolar depression    This condition was last documented, assessed, and coded on 8/14/2020  If this is correct, please document, assess, and code at your next visit on 7/23/2021    [place] [id number]  coding opportunities             Chart reviewed, (number of) suggestions sent to provider: 1                  Patients insurance company: [employer] (Medicare Advantage and [employer])

## 2021-07-17 ENCOUNTER — APPOINTMENT (OUTPATIENT)
Dept: LAB | Facility: CLINIC | Age: 61
End: 2021-07-17
Payer: COMMERCIAL

## 2021-07-17 DIAGNOSIS — R11.0 NAUSEA: ICD-10-CM

## 2021-07-17 DIAGNOSIS — Z13.220 SCREENING, LIPID: ICD-10-CM

## 2021-07-17 LAB
ALBUMIN SERPL BCP-MCNC: 3.8 G/DL (ref 3.5–5)
ALP SERPL-CCNC: 80 U/L (ref 46–116)
ALT SERPL W P-5'-P-CCNC: 23 U/L (ref 12–78)
AMYLASE SERPL-CCNC: 70 IU/L (ref 25–115)
ANION GAP SERPL CALCULATED.3IONS-SCNC: 1 MMOL/L (ref 4–13)
AST SERPL W P-5'-P-CCNC: 18 U/L (ref 5–45)
BACTERIA UR QL AUTO: ABNORMAL /HPF
BILIRUB SERPL-MCNC: 0.39 MG/DL (ref 0.2–1)
BILIRUB UR QL STRIP: NEGATIVE
BUN SERPL-MCNC: 22 MG/DL (ref 5–25)
CALCIUM SERPL-MCNC: 9.3 MG/DL (ref 8.3–10.1)
CHLORIDE SERPL-SCNC: 107 MMOL/L (ref 100–108)
CHOLEST SERPL-MCNC: 230 MG/DL (ref 50–200)
CLARITY UR: CLEAR
CO2 SERPL-SCNC: 30 MMOL/L (ref 21–32)
COLOR UR: YELLOW
CREAT SERPL-MCNC: 1.03 MG/DL (ref 0.6–1.3)
CREAT UR-MCNC: 102 MG/DL
GFR SERPL CREATININE-BSD FRML MDRD: 60 ML/MIN/1.73SQ M
GLUCOSE P FAST SERPL-MCNC: 95 MG/DL (ref 65–99)
GLUCOSE UR STRIP-MCNC: NEGATIVE MG/DL
HDLC SERPL-MCNC: 63 MG/DL
HGB UR QL STRIP.AUTO: NEGATIVE
HYALINE CASTS #/AREA URNS LPF: ABNORMAL /LPF
KETONES UR STRIP-MCNC: NEGATIVE MG/DL
LDLC SERPL CALC-MCNC: 148 MG/DL (ref 0–100)
LEUKOCYTE ESTERASE UR QL STRIP: NEGATIVE
LIPASE SERPL-CCNC: 148 U/L (ref 73–393)
MICROALBUMIN UR-MCNC: 5.8 MG/L (ref 0–20)
MICROALBUMIN/CREAT 24H UR: 6 MG/G CREATININE (ref 0–30)
NITRITE UR QL STRIP: NEGATIVE
NON-SQ EPI CELLS URNS QL MICRO: ABNORMAL /HPF
NONHDLC SERPL-MCNC: 167 MG/DL
PH UR STRIP.AUTO: 6 [PH]
POTASSIUM SERPL-SCNC: 4.1 MMOL/L (ref 3.5–5.3)
PROT SERPL-MCNC: 7.6 G/DL (ref 6.4–8.2)
PROT UR STRIP-MCNC: NEGATIVE MG/DL
RBC #/AREA URNS AUTO: ABNORMAL /HPF
SODIUM SERPL-SCNC: 138 MMOL/L (ref 136–145)
SP GR UR STRIP.AUTO: 1.02 (ref 1–1.03)
TRIGL SERPL-MCNC: 95 MG/DL
UROBILINOGEN UR QL STRIP.AUTO: 0.2 E.U./DL
WBC #/AREA URNS AUTO: ABNORMAL /HPF

## 2021-07-17 PROCEDURE — 36415 COLL VENOUS BLD VENIPUNCTURE: CPT

## 2021-07-17 PROCEDURE — 81001 URINALYSIS AUTO W/SCOPE: CPT | Performed by: FAMILY MEDICINE

## 2021-07-17 PROCEDURE — 82043 UR ALBUMIN QUANTITATIVE: CPT | Performed by: FAMILY MEDICINE

## 2021-07-17 PROCEDURE — 80053 COMPREHEN METABOLIC PANEL: CPT

## 2021-07-17 PROCEDURE — 82150 ASSAY OF AMYLASE: CPT

## 2021-07-17 PROCEDURE — 80061 LIPID PANEL: CPT

## 2021-07-17 PROCEDURE — 82570 ASSAY OF URINE CREATININE: CPT | Performed by: FAMILY MEDICINE

## 2021-07-17 PROCEDURE — 83690 ASSAY OF LIPASE: CPT

## 2021-07-23 ENCOUNTER — OFFICE VISIT (OUTPATIENT)
Dept: FAMILY MEDICINE CLINIC | Facility: CLINIC | Age: 61
End: 2021-07-23
Payer: COMMERCIAL

## 2021-07-23 VITALS
WEIGHT: 152 LBS | DIASTOLIC BLOOD PRESSURE: 70 MMHG | OXYGEN SATURATION: 99 % | BODY MASS INDEX: 26.93 KG/M2 | HEART RATE: 77 BPM | RESPIRATION RATE: 19 BRPM | HEIGHT: 63 IN | SYSTOLIC BLOOD PRESSURE: 117 MMHG

## 2021-07-23 DIAGNOSIS — K21.00 GASTROESOPHAGEAL REFLUX DISEASE WITH ESOPHAGITIS WITHOUT HEMORRHAGE: ICD-10-CM

## 2021-07-23 DIAGNOSIS — K21.9 GASTROESOPHAGEAL REFLUX DISEASE WITHOUT ESOPHAGITIS: ICD-10-CM

## 2021-07-23 DIAGNOSIS — F41.9 ANXIETY: ICD-10-CM

## 2021-07-23 DIAGNOSIS — I10 BENIGN HYPERTENSION: Primary | ICD-10-CM

## 2021-07-23 PROCEDURE — 1036F TOBACCO NON-USER: CPT | Performed by: FAMILY MEDICINE

## 2021-07-23 PROCEDURE — 3078F DIAST BP <80 MM HG: CPT | Performed by: FAMILY MEDICINE

## 2021-07-23 PROCEDURE — 3074F SYST BP LT 130 MM HG: CPT | Performed by: FAMILY MEDICINE

## 2021-07-23 PROCEDURE — 99214 OFFICE O/P EST MOD 30 MIN: CPT | Performed by: FAMILY MEDICINE

## 2021-07-23 PROCEDURE — 3008F BODY MASS INDEX DOCD: CPT | Performed by: FAMILY MEDICINE

## 2021-07-23 RX ORDER — PANTOPRAZOLE SODIUM 40 MG/1
TABLET, DELAYED RELEASE ORAL
Qty: 30 TABLET | Refills: 0 | Status: SHIPPED | OUTPATIENT
Start: 2021-07-23 | End: 2021-07-29 | Stop reason: SDUPTHER

## 2021-07-23 NOTE — PROGRESS NOTES
Assessment/Plan:     Chronic Problems:  Benign hypertension    Blood pressures from home reviewed  Stay on the current dose of amlodipine  Anxiety  Pt feels she is very short with people  Takes buspar prn and thinks it helps  Stay on the same   Gastroesophageal reflux disease without esophagitis    Patient was advised to finish her pantoprazole now  She is hesitant to be on this medication another month  I have advised her she can take Pepcid 20 mg up to twice daily, but I am calling in 1 more month of pantoprazole if needed  Visit Diagnosis:  Diagnoses and all orders for this visit:    Benign hypertension    Anxiety    Gastroesophageal reflux disease without esophagitis    Gastroesophageal reflux disease with esophagitis without hemorrhage  Comments: Will start pantoprazole bid x 7 days and then qd  Advised to f/u in 2 weeks  Avoid alcohol  [place] foods  Orders:  -     pantoprazole (PROTONIX) 40 mg tablet; Take 1 po daily          Subjective:    Patient ID: Lavonne Dixon is a 63 y o  female  Pt is here for f/u on her gerd  Loves the pantoprazole and feels she is no longer clearing her throat  No heartburn on this medication  Pt did not speak with her podiatrist yet, but she is doing exercises for plantar fasciitis  Pt has a list of bp's with her from home  Blood pressures are 127/78, 140/69, 134/80, 130/73, 118/71, 140/77, 140/75  Takes all other meds as directed  No side effects noted  The following portions of the patient's history were reviewed and updated as appropriate: allergies, current medications, past family history, past medical history, past social history, past surgical history and problem list     Review of Systems   Constitutional: Negative for chills, diaphoresis, fatigue and fever  HENT: Negative  Eyes: Negative  Respiratory: Negative for cough, shortness of breath and wheezing  Cardiovascular: Negative for chest pain and palpitations     Gastrointestinal: Negative for abdominal pain, constipation, diarrhea, nausea and vomiting  All gi indigestion and heartburn is gone  Endocrine: Positive for heat intolerance  Genitourinary: Negative  Neurological: Negative for dizziness, light-headedness and headaches  Psychiatric/Behavioral: Negative for dysphoric mood  The patient is nervous/anxious  Just sad at times  /70   Pulse 77   Resp 19   Ht 5' 3" (1 6 m)   Wt 68 9 kg (152 lb)   SpO2 99%   BMI 26 93 kg/m²   Social History     Socioeconomic History    Marital status: /Civil Union     Spouse name: Not on file    Number of children: Not on file    Years of education: Not on file    Highest education level: Not on file   Occupational History    Occupation: employed   Tobacco Use    Smoking status: Former Smoker    Smokeless tobacco: Never Used   Substance and Sexual Activity    Alcohol use: Yes     Comment: social    Drug use: No    Sexual activity: Not on file   Other Topics Concern    Not on file   Social History Narrative    Always uses seatbelt    Lives with spouse    No caffeine use     Social Determinants of Health     Financial Resource Strain:     Difficulty of Paying Living Expenses:    Food Insecurity:     Worried About Running Out of Food in the Last Year:     [address] in the Last Year:    Transportation Needs:     Lack of Transportation (Medical):      Lack of Transportation (Non-Medical):    Physical Activity:     Days of Exercise per Week:     Minutes of Exercise per Session:    Stress:     Feeling of Stress :    Social Connections:     Frequency of Communication with Friends and Family:     Frequency of Social Gatherings with Friends and Family:     Attends Zoroastrian Services:     Active Member of Clubs or Organizations:     Attends Club or Organization Meetings:     Marital Status:    Intimate Partner Violence:     Fear of Current or Ex-Partner:     Emotionally Abused:     Physically Abused:     Sexually Abused:      Past Medical History:   Diagnosis Date    Depression with anxiety 11/8/2018     Family History   Problem Relation Age of Onset    Heart attack Mother         MI    Heart attack Father         MI    Migraines Sister     Heart attack Brother         MI     History reviewed  No pertinent surgical history  Current Outpatient Medications:     ALPRAZolam (XANAX) 0 5 mg tablet, Take 1 po prior to plane rides  , Disp: 10 tablet, Rfl: 0    amLODIPine (NORVASC) 5 mg tablet, Take 1 tablet (5 mg total) by mouth daily, Disp: 90 tablet, Rfl: 0    busPIRone (BUSPAR) 15 mg tablet, TAKE 1 TABLET BY MOUTH THREE TIMES A DAY AS NEEDED FOR ANXIETY, Disp: 90 tablet, Rfl: 1    pantoprazole (PROTONIX) 40 mg tablet, Take 1 po daily, Disp: 30 tablet, Rfl: 0    mometasone (ELOCON) 0 1 % cream, Apply topically daily To eyelids only   (Patient not taking: Reported on 1/18/2021), Disp: 45 g, Rfl: 0    ondansetron (ZOFRAN) 4 mg tablet, Take 1 tablet (4 mg total) by mouth every 8 (eight) hours as needed for nausea or vomiting (Patient not taking: Reported on 1/18/2021), Disp: 20 tablet, Rfl: 0    SUMAtriptan (IMITREX) 50 mg tablet, Take by mouth (Patient not taking: Reported on 6/21/2021), Disp: , Rfl:     Allergies   Allergen Reactions    Meloxicam Throat Swelling    Skelaxin [Metaxalone] Throat Swelling          Lab Review   Appointment on 07/17/2021   Component Date Value    Sodium 07/17/2021 138     Potassium 07/17/2021 4 1     Chloride 07/17/2021 107     CO2 07/17/2021 30     ANION GAP 07/17/2021 1*    BUN 07/17/2021 22     Creatinine 07/17/2021 1 03     Glucose, Fasting 07/17/2021 95     Calcium 07/17/2021 9 3     AST 07/17/2021 18     ALT 07/17/2021 23     Alkaline Phosphatase 07/17/2021 80     Total Protein 07/17/2021 7 6     Albumin 07/17/2021 3 8     Total Bilirubin 07/17/2021 0 39     eGFR 07/17/2021 60     Cholesterol 07/17/2021 230*    Triglycerides 07/17/2021 95     HDL, Direct 07/17/2021 63     LDL Calculated 07/17/2021 148*    Non-HDL-Chol (CHOL-HDL) 07/17/2021 167     Amylase 07/17/2021 70     Lipase 07/17/2021 148    Office Visit on 06/21/2021   Component Date Value    Creatinine, Ur 07/17/2021 102 0     Microalbum  ,U,Random 07/17/2021 5 8     Microalb Creat Ratio 07/17/2021 6     Clarity, UA 07/17/2021 Clear     Color, UA 07/17/2021 Yellow     Specific Gravity, UA 07/17/2021 1 019     pH, UA 07/17/2021 6 0     Glucose, UA 07/17/2021 Negative     Ketones, UA 07/17/2021 Negative     Blood, UA 07/17/2021 Negative     Protein, UA 07/17/2021 Negative     Nitrite, UA 07/17/2021 Negative     Bilirubin, UA 07/17/2021 Negative     Urobilinogen, UA 07/17/2021 0 2     Leukocytes, UA 07/17/2021 Negative     WBC, UA 07/17/2021 None Seen     RBC, UA 07/17/2021 None Seen     Hyaline Casts, UA 07/17/2021 3-5*    Bacteria, UA 07/17/2021 Occasional     Epithelial Cells 07/17/2021 None Seen         Imaging: No results found  Objective:     Physical Exam  Vitals and nursing note reviewed  Constitutional:       General: She is not in acute distress  Appearance: Normal appearance  She is well-developed  She is not ill-appearing, toxic-appearing or diaphoretic  HENT:      Head: Normocephalic and atraumatic  Right Ear: Tympanic membrane, ear canal and external ear normal  There is no impacted cerumen  Left Ear: Tympanic membrane, ear canal and external ear normal  There is no impacted cerumen  Nose: Nose normal  No congestion  Mouth/Throat:      Mouth: Mucous membranes are moist       Pharynx: No oropharyngeal exudate  Eyes:      General:         Right eye: No discharge  Left eye: No discharge  Extraocular Movements: Extraocular movements intact  Conjunctiva/sclera: Conjunctivae normal       Pupils: Pupils are equal, round, and reactive to light     Cardiovascular:      Rate and Rhythm: Normal rate and regular rhythm  Heart sounds: No murmur heard  Pulmonary:      Effort: Pulmonary effort is normal  No respiratory distress  Breath sounds: Normal breath sounds  Abdominal:      General: Abdomen is flat  Bowel sounds are normal       Palpations: Abdomen is soft  Musculoskeletal:         General: No deformity  Normal range of motion  Cervical back: Normal range of motion and neck supple  No rigidity  Right lower leg: No edema  Left lower leg: No edema  Skin:     General: Skin is warm  Capillary Refill: Capillary refill takes less than 2 seconds  Coloration: Skin is not pale  Findings: No erythema  Neurological:      General: No focal deficit present  Mental Status: She is alert and oriented to person, place, and time  Psychiatric:         Mood and Affect: Mood normal          Behavior: Behavior normal          Thought Content: Thought content normal          Judgment: Judgment normal            Patient Instructions     Reviewed all with patient  Blood pressures from home are slightly lower but I am not taking you off the amlodipine  Blood pressure here today is perfect  You must work on your weight  Since you do not want to stay on pantoprazole for another month finish this month and then [facility], however I will send in another month in case you have breakthrough  If you do have breakthrough on the pantoprazole or cannot stop this medicine I will send to MELECIO Hancock    Portions of the record may have been created with voice recognition software  Occasional wrong word or "sound a like" substitutions may have occurred due to the inherent limitations of voice recognition software  Read the chart carefully and recognize, using context, where substitutions have occurred

## 2021-07-23 NOTE — PATIENT INSTRUCTIONS
Reviewed all with patient  Blood pressures from home are slightly lower but I am not taking you off the amlodipine  Blood pressure here today is perfect  You must work on your weight  Since you do not want to stay on pantoprazole for another month finish this month and then go right into pepcid, however I will send in another month in case you have breakthrough  If you do have breakthrough on the pantoprazole or cannot stop this medicine I will send to gi

## 2021-07-29 DIAGNOSIS — K21.00 GASTROESOPHAGEAL REFLUX DISEASE WITH ESOPHAGITIS WITHOUT HEMORRHAGE: ICD-10-CM

## 2021-07-29 RX ORDER — PANTOPRAZOLE SODIUM 40 MG/1
TABLET, DELAYED RELEASE ORAL
Qty: 30 TABLET | Refills: 0 | Status: SHIPPED | OUTPATIENT
Start: 2021-07-29 | End: 2021-10-08

## 2021-08-31 DIAGNOSIS — F41.8 DEPRESSION WITH ANXIETY: ICD-10-CM

## 2021-09-01 RX ORDER — ALPRAZOLAM 0.5 MG/1
TABLET ORAL
Qty: 30 TABLET | Refills: 0 | Status: SHIPPED | OUTPATIENT
Start: 2021-09-01 | End: 2021-11-23 | Stop reason: SDUPTHER

## 2021-10-08 DIAGNOSIS — K21.00 GASTROESOPHAGEAL REFLUX DISEASE WITH ESOPHAGITIS WITHOUT HEMORRHAGE: ICD-10-CM

## 2021-10-08 RX ORDER — PANTOPRAZOLE SODIUM 40 MG/1
TABLET, DELAYED RELEASE ORAL
Qty: 30 TABLET | Refills: 0 | Status: SHIPPED | OUTPATIENT
Start: 2021-10-08 | End: 2021-11-17

## 2021-10-14 DIAGNOSIS — F41.9 ANXIETY: ICD-10-CM

## 2021-10-14 RX ORDER — BUSPIRONE HYDROCHLORIDE 15 MG/1
TABLET ORAL
Qty: 90 TABLET | Refills: 1 | Status: SHIPPED | OUTPATIENT
Start: 2021-10-14 | End: 2022-03-31

## 2021-10-15 DIAGNOSIS — I10 BENIGN HYPERTENSION: ICD-10-CM

## 2021-10-15 RX ORDER — AMLODIPINE BESYLATE 5 MG/1
TABLET ORAL
Qty: 30 TABLET | Refills: 2 | Status: SHIPPED | OUTPATIENT
Start: 2021-10-15 | End: 2022-01-26

## 2021-11-17 DIAGNOSIS — K21.00 GASTROESOPHAGEAL REFLUX DISEASE WITH ESOPHAGITIS WITHOUT HEMORRHAGE: ICD-10-CM

## 2021-11-17 RX ORDER — PANTOPRAZOLE SODIUM 40 MG/1
TABLET, DELAYED RELEASE ORAL
Qty: 30 TABLET | Refills: 0 | Status: SHIPPED | OUTPATIENT
Start: 2021-11-17 | End: 2021-11-23 | Stop reason: ALTCHOICE

## 2021-11-23 ENCOUNTER — OFFICE VISIT (OUTPATIENT)
Dept: FAMILY MEDICINE CLINIC | Facility: CLINIC | Age: 61
End: 2021-11-23
Payer: COMMERCIAL

## 2021-11-23 VITALS
DIASTOLIC BLOOD PRESSURE: 76 MMHG | RESPIRATION RATE: 18 BRPM | OXYGEN SATURATION: 98 % | SYSTOLIC BLOOD PRESSURE: 130 MMHG | WEIGHT: 158 LBS | TEMPERATURE: 97.2 F | BODY MASS INDEX: 28 KG/M2 | HEIGHT: 63 IN | HEART RATE: 73 BPM

## 2021-11-23 DIAGNOSIS — I10 BENIGN HYPERTENSION: ICD-10-CM

## 2021-11-23 DIAGNOSIS — K21.9 GASTROESOPHAGEAL REFLUX DISEASE WITHOUT ESOPHAGITIS: ICD-10-CM

## 2021-11-23 DIAGNOSIS — F41.9 ANXIETY: Primary | ICD-10-CM

## 2021-11-23 PROCEDURE — 3008F BODY MASS INDEX DOCD: CPT | Performed by: FAMILY MEDICINE

## 2021-11-23 PROCEDURE — 3075F SYST BP GE 130 - 139MM HG: CPT | Performed by: FAMILY MEDICINE

## 2021-11-23 PROCEDURE — 3725F SCREEN DEPRESSION PERFORMED: CPT | Performed by: FAMILY MEDICINE

## 2021-11-23 PROCEDURE — 99214 OFFICE O/P EST MOD 30 MIN: CPT | Performed by: FAMILY MEDICINE

## 2021-11-23 PROCEDURE — 3078F DIAST BP <80 MM HG: CPT | Performed by: FAMILY MEDICINE

## 2021-11-23 PROCEDURE — 1036F TOBACCO NON-USER: CPT | Performed by: FAMILY MEDICINE

## 2021-11-23 RX ORDER — ALPRAZOLAM 0.5 MG/1
TABLET ORAL
Qty: 30 TABLET | Refills: 0 | Status: SHIPPED | OUTPATIENT
Start: 2021-11-23 | End: 2022-01-28

## 2021-12-01 ENCOUNTER — CLINICAL SUPPORT (OUTPATIENT)
Dept: FAMILY MEDICINE CLINIC | Facility: CLINIC | Age: 61
End: 2021-12-01
Payer: COMMERCIAL

## 2021-12-01 ENCOUNTER — TELEPHONE (OUTPATIENT)
Dept: FAMILY MEDICINE CLINIC | Facility: CLINIC | Age: 61
End: 2021-12-01

## 2021-12-01 DIAGNOSIS — Z23 NEED FOR TUBERCULOSIS VACCINATION: Primary | ICD-10-CM

## 2021-12-01 PROCEDURE — 86580 TB INTRADERMAL TEST: CPT

## 2021-12-03 ENCOUNTER — CLINICAL SUPPORT (OUTPATIENT)
Dept: FAMILY MEDICINE CLINIC | Facility: CLINIC | Age: 61
End: 2021-12-03

## 2021-12-03 DIAGNOSIS — Z11.1 ENCOUNTER FOR PPD SKIN TEST READING: Primary | ICD-10-CM

## 2021-12-03 DIAGNOSIS — Z01.00 ENCOUNTER FOR VISION SCREENING: ICD-10-CM

## 2021-12-03 LAB
INDURATION: 0 MM
TB SKIN TEST: NEGATIVE

## 2022-01-04 ENCOUNTER — ANNUAL EXAM (OUTPATIENT)
Dept: FAMILY MEDICINE CLINIC | Facility: CLINIC | Age: 62
End: 2022-01-04
Payer: COMMERCIAL

## 2022-01-04 VITALS
OXYGEN SATURATION: 97 % | BODY MASS INDEX: 28.17 KG/M2 | RESPIRATION RATE: 12 BRPM | WEIGHT: 159 LBS | TEMPERATURE: 97.4 F | SYSTOLIC BLOOD PRESSURE: 140 MMHG | HEART RATE: 88 BPM | HEIGHT: 63 IN | DIASTOLIC BLOOD PRESSURE: 90 MMHG

## 2022-01-04 DIAGNOSIS — Z01.419 ROUTINE GYNECOLOGICAL EXAMINATION: Primary | ICD-10-CM

## 2022-01-04 DIAGNOSIS — Z78.0 MENOPAUSE: ICD-10-CM

## 2022-01-04 DIAGNOSIS — Z12.31 SCREENING MAMMOGRAM, ENCOUNTER FOR: ICD-10-CM

## 2022-01-04 DIAGNOSIS — Z12.11 SCREENING FOR MALIGNANT NEOPLASM OF COLON: ICD-10-CM

## 2022-01-04 PROCEDURE — 3080F DIAST BP >= 90 MM HG: CPT | Performed by: FAMILY MEDICINE

## 2022-01-04 PROCEDURE — G0145 SCR C/V CYTO,THINLAYER,RESCR: HCPCS | Performed by: FAMILY MEDICINE

## 2022-01-04 PROCEDURE — 3008F BODY MASS INDEX DOCD: CPT | Performed by: FAMILY MEDICINE

## 2022-01-04 PROCEDURE — 1036F TOBACCO NON-USER: CPT | Performed by: FAMILY MEDICINE

## 2022-01-04 PROCEDURE — 3725F SCREEN DEPRESSION PERFORMED: CPT | Performed by: FAMILY MEDICINE

## 2022-01-04 PROCEDURE — 99214 OFFICE O/P EST MOD 30 MIN: CPT | Performed by: FAMILY MEDICINE

## 2022-01-04 PROCEDURE — 3077F SYST BP >= 140 MM HG: CPT | Performed by: FAMILY MEDICINE

## 2022-01-04 PROCEDURE — G0476 HPV COMBO ASSAY CA SCREEN: HCPCS | Performed by: FAMILY MEDICINE

## 2022-01-04 RX ORDER — MELATONIN
2000 DAILY
Qty: 60 TABLET | Refills: 3
Start: 2022-01-04

## 2022-01-04 NOTE — PROGRESS NOTES
Assessment/Plan:     Chronic Problems:  No problem-specific Assessment & Plan notes found for this encounter  Visit Diagnosis:  Diagnoses and all orders for this visit:    Routine gynecological examination  Comments: Will follow pap and call with results  Orders:  -     Liquid-based pap, screening    Screening mammogram, encounter for  Comments:  Advised to have the mammo done  Orders:  -     Mammo screening bilateral w 3d & cad; Future    Menopause  Comments:   patient was advised she needs to be on 2000 units of vitamin D3 daily for her bones and get 0894-2512 mg daily of calcium either in her diet or in a supplement  Orders:  -     cholecalciferol (VITAMIN D3) 1,000 units tablet; Take 2 tablets (2,000 Units total) by mouth daily    Screening for malignant neoplasm of colon  -     Occult Blood, Fecal Immunochemical; Future          Subjective      Yesi Ryan is a 63 y o  female who presents for annual exam  No menses since June 2019  No intermenstrual bleeding, spotting, or discharge  The patient reports that there is not domestic violence in her life  Current contraception: none  History of abnormal Pap smear: no  Family history of uterine or ovarian cancer: no   Regular self breast exam: no  History of abnormal mammogram: no  Family history of breast cancer: yes - maternal aunt  History of abnormal lipids: no  Previous gyn surgeries:  no  History of previous sti's:  no  Age at menarche: 17 or 17  Age at menopause: 66 or 64  OB/GYN history: T-  P-  A-  L- : 0  Vaginal deliveries - 0  C-Sections - 0   Last pap - 12-18-18  Last mammo - 1-13-21  Last colonoscopy - 12-22-15  Stool for ifobt -      HPI  Pt is here for routine gyn appt  Has no concerns other than getting fat since menopause   FDLMP - June 2019      The following portions of the patient's history were reviewed and updated as appropriate: allergies, current medications, past family history, past medical history, past social history, past surgical history and problem list       Review of Systems  Review of Systems   Constitutional: Negative for chills, diaphoresis, fatigue and fever  HENT: Negative for ear pain, postnasal drip, sinus pressure, sinus pain and sore throat  Eyes: Negative for pain and visual disturbance  Respiratory: Negative for cough, chest tightness and shortness of breath  Cardiovascular: Negative for chest pain and palpitations  Gastrointestinal: Negative for abdominal pain, constipation, diarrhea, nausea and vomiting  Genitourinary: Negative for dysuria, frequency and urgency  Musculoskeletal: Negative for arthralgias, gait problem and myalgias  Skin: Negative for rash and wound  Neurological: Negative for dizziness, light-headedness and numbness  Psychiatric/Behavioral: Negative for dysphoric mood and sleep disturbance  The patient is not nervous/anxious          /90 (BP Location: Right arm)   Pulse 88   Temp (!) 97 4 °F (36 3 °C)   Resp 12   Ht 5' 3" (1 6 m)   Wt 72 1 kg (159 lb)   SpO2 97%   BMI 28 17 kg/m²   Social History     Socioeconomic History    Marital status: /Civil Union     Spouse name: Not on file    Number of children: Not on file    Years of education: Not on file    Highest education level: Not on file   Occupational History    Occupation: employed   Tobacco Use    Smoking status: Former Smoker    Smokeless tobacco: Never Used   Substance and Sexual Activity    Alcohol use: Yes     Comment: social    Drug use: No    Sexual activity: Not on file   Other Topics Concern    Not on file   Social History Narrative    Always uses seatbelt    Lives with spouse    No caffeine use     Social Determinants of Health     Financial Resource Strain: Not on file   Food Insecurity: Not on file   Transportation Needs: Not on file   Physical Activity: Not on file   Stress: Not on file   Social Connections: Not on file   Intimate Partner Violence: Not on file Housing Stability: Not on file     Past Medical History:   Diagnosis Date    Depression with anxiety 11/8/2018     Family History   Problem Relation Age of Onset    Heart attack Mother         MI    Heart attack Father         MI    Migraines Sister     Heart attack Brother         MI     History reviewed  No pertinent surgical history  Current Outpatient Medications:     ALPRAZolam (XANAX) 0 5 mg tablet, TAKE 1/2 TO 1 TABLET AT BEDTIME IF NEEDED SPARINGLY, Disp: 30 tablet, Rfl: 0    amLODIPine (NORVASC) 5 mg tablet, TAKE 1 TABLET BY MOUTH EVERY DAY, Disp: 30 tablet, Rfl: 2    busPIRone (BUSPAR) 15 mg tablet, TAKE 1 TABLET BY MOUTH THREE TIMES A DAY AS NEEDED FOR ANXIETY, Disp: 90 tablet, Rfl: 1    cholecalciferol (VITAMIN D3) 1,000 units tablet, Take 2 tablets (2,000 Units total) by mouth daily, Disp: 60 tablet, Rfl: 3      Results for orders placed or performed in visit on 12/01/21   TB Skin Test   Result Value Ref Range    TB Skin Test Negative     Induration 0 mm       Objective     Lab Review   Clinical Support on 12/01/2021   Component Date Value    TB Skin Test 12/03/2021 Negative     Induration 12/03/2021 0           Imaging: No results found  Physical Exam  Vitals and nursing note reviewed  Constitutional:       General: She is not in acute distress  Appearance: Normal appearance  She is well-developed  She is not ill-appearing, toxic-appearing or diaphoretic  HENT:      Head: Normocephalic and atraumatic  Right Ear: External ear normal       Left Ear: External ear normal    Eyes:      General:         Right eye: No discharge  Left eye: No discharge  Conjunctiva/sclera: Conjunctivae normal       Pupils: Pupils are equal, round, and reactive to light  Neck:      Thyroid: No thyromegaly  Cardiovascular:      Rate and Rhythm: Normal rate and regular rhythm  Heart sounds: Normal heart sounds  No murmur heard  No friction rub     Pulmonary:      Effort: Pulmonary effort is normal  No respiratory distress  Breath sounds: Normal breath sounds  No wheezing or rales  Abdominal:      General: Bowel sounds are normal       Palpations: Abdomen is soft  Tenderness: There is no abdominal tenderness  Hernia: There is no hernia in the left inguinal area or right inguinal area  Genitourinary:     Exam position: Supine  Pubic Area: No rash  Labia:         Right: No rash  Left: No rash  Urethra: No prolapse  Vagina: Normal       Cervix: Normal       Uterus: Enlarged: Unable to palpate r/t weight  Adnexa:         Right: No mass  Left: No mass  Rectum: Normal    Musculoskeletal:         General: No tenderness or deformity  Normal range of motion  Cervical back: Normal range of motion and neck supple  Lymphadenopathy:      Cervical: No cervical adenopathy  Skin:     General: Skin is warm and dry  Findings: No rash  Comments: Breasts with no masses lesions or discharge  Neurological:      Mental Status: She is alert and oriented to person, place, and time  Cranial Nerves: No cranial nerve deficit  Psychiatric:         Mood and Affect: Mood normal          Behavior: Behavior normal          Thought Content: Thought content normal          Judgment: Judgment normal            Portions of the record may have been created with voice recognition software   Occasional wrong word or "sound a like" substitutions may have occurred due to the inherent limitations of voice recognition software   Read the chart carefully and recognize, using context, where substitutions have occurred

## 2022-01-04 NOTE — PATIENT INSTRUCTIONS
Reviewed all with patient  We will follow the Pap I will call with results  Schedule the mammogram   I also suggest to start 2000 units of vitamin D3 daily and a supplement however if you can get the 2005-0056 mg of calcium in your diet that would be better  Weight-bearing exercises  Work on the [employer]  Cut back on carbohydrates increase the exercise make better food choices

## 2022-01-12 LAB
LAB AP GYN PRIMARY INTERPRETATION: NORMAL
LAB AP LMP: NORMAL
Lab: NORMAL

## 2022-01-13 ENCOUNTER — TELEPHONE (OUTPATIENT)
Dept: FAMILY MEDICINE CLINIC | Facility: CLINIC | Age: 62
End: 2022-01-13

## 2022-01-13 NOTE — TELEPHONE ENCOUNTER
----- Message from [address] [place] [address]  sent at 1/13/2022  9:24 AM EST -----  Pap is negative repeat in 3 to 5 years sooner for problems

## 2022-01-26 DIAGNOSIS — I10 BENIGN HYPERTENSION: ICD-10-CM

## 2022-01-26 RX ORDER — AMLODIPINE BESYLATE 5 MG/1
TABLET ORAL
Qty: 30 TABLET | Refills: 2 | Status: SHIPPED | OUTPATIENT
Start: 2022-01-26 | End: 2022-05-08

## 2022-01-27 DIAGNOSIS — F41.9 ANXIETY: ICD-10-CM

## 2022-01-28 RX ORDER — ALPRAZOLAM 0.5 MG/1
TABLET ORAL
Qty: 30 TABLET | Refills: 0 | Status: SHIPPED | OUTPATIENT
Start: 2022-01-28 | End: 2022-03-31

## 2022-03-30 DIAGNOSIS — F41.9 ANXIETY: ICD-10-CM

## 2022-03-31 RX ORDER — BUSPIRONE HYDROCHLORIDE 15 MG/1
TABLET ORAL
Qty: 90 TABLET | Refills: 1 | Status: SHIPPED | OUTPATIENT
Start: 2022-03-31 | End: 2022-06-13

## 2022-03-31 RX ORDER — ALPRAZOLAM 0.5 MG/1
TABLET ORAL
Qty: 30 TABLET | Refills: 0 | Status: SHIPPED | OUTPATIENT
Start: 2022-03-31 | End: 2022-05-25 | Stop reason: SDUPTHER

## 2022-05-25 ENCOUNTER — OFFICE VISIT (OUTPATIENT)
Dept: FAMILY MEDICINE CLINIC | Facility: CLINIC | Age: 62
End: 2022-05-25
Payer: COMMERCIAL

## 2022-05-25 VITALS
BODY MASS INDEX: 28.53 KG/M2 | RESPIRATION RATE: 18 BRPM | WEIGHT: 161 LBS | HEART RATE: 94 BPM | DIASTOLIC BLOOD PRESSURE: 80 MMHG | HEIGHT: 63 IN | TEMPERATURE: 98.4 F | OXYGEN SATURATION: 97 % | SYSTOLIC BLOOD PRESSURE: 150 MMHG

## 2022-05-25 DIAGNOSIS — F41.9 ANXIETY: Primary | ICD-10-CM

## 2022-05-25 PROBLEM — F11.20 CONTINUOUS OPIOID DEPENDENCE (HCC): Status: ACTIVE | Noted: 2022-05-25

## 2022-05-25 PROCEDURE — 99213 OFFICE O/P EST LOW 20 MIN: CPT | Performed by: NURSE PRACTITIONER

## 2022-05-25 PROCEDURE — 1036F TOBACCO NON-USER: CPT | Performed by: NURSE PRACTITIONER

## 2022-05-25 PROCEDURE — 3077F SYST BP >= 140 MM HG: CPT | Performed by: NURSE PRACTITIONER

## 2022-05-25 PROCEDURE — 3725F SCREEN DEPRESSION PERFORMED: CPT | Performed by: NURSE PRACTITIONER

## 2022-05-25 PROCEDURE — 3008F BODY MASS INDEX DOCD: CPT | Performed by: NURSE PRACTITIONER

## 2022-05-25 PROCEDURE — 3079F DIAST BP 80-89 MM HG: CPT | Performed by: NURSE PRACTITIONER

## 2022-05-25 RX ORDER — AMOXICILLIN 500 MG/1
CAPSULE ORAL
COMMUNITY
Start: 2022-05-23 | End: 2022-07-12

## 2022-05-25 RX ORDER — HYDROCODONE BITARTRATE AND ACETAMINOPHEN 5; 325 MG/1; MG/1
1 TABLET ORAL EVERY 6 HOURS PRN
COMMUNITY
Start: 2022-05-23 | End: 2022-06-01 | Stop reason: ALTCHOICE

## 2022-05-25 RX ORDER — ALPRAZOLAM 0.5 MG/1
TABLET ORAL
Qty: 30 TABLET | Refills: 0 | Status: SHIPPED | OUTPATIENT
Start: 2022-05-25 | End: 2022-07-29

## 2022-05-25 RX ORDER — IBUPROFEN 600 MG/1
600 TABLET ORAL EVERY 6 HOURS PRN
COMMUNITY
Start: 2022-05-23 | End: 2022-06-01 | Stop reason: ALTCHOICE

## 2022-05-25 NOTE — ASSESSMENT & PLAN NOTE
Patient episode of anxiety  Denies being suicidal   Was seen in the emergency room at [facility]  Feels he needs a little bit more guidance  Came to the office  Discussed with patient is okay to take amoxicillin and ibuprofen as needed for dental pain  Take BuSpar 3 times a day for the next few days  Referral made to Psychiatry for therapy  Note given for work  Discussed self-care    Discussed maintaining safety, patient states that she is not suicidal   Will follow-up next

## 2022-05-25 NOTE — PATIENT INSTRUCTIONS
Continued amoxicillin this is further dental infection   Take ibuprofen every 6 hours during the day for pain may take the hydrocodone at night for acute pain if your not in acute pain you can leave it alone  Take BuSpar for the next few days 3 times a day  You may take 0 5 mg of Xanax for acute episodes of anxiety it will make you sleepy so most likely take at night  Follow-up with Psychiatry

## 2022-05-25 NOTE — PROGRESS NOTES
Assessment/Plan:  BMI Counseling: Body mass index is 28 52 kg/m²  The BMI is above normal  Nutrition recommendations include decreasing portion sizes, encouraging healthy choices of fruits and vegetables, decreasing fast food intake, consuming healthier snacks, limiting drinks that contain sugar, moderation in carbohydrate intake, increasing intake of lean protein, reducing intake of saturated and trans fat and reducing intake of cholesterol  Exercise recommendations include exercising 3-5 times per week and strength training exercises  No pharmacotherapy was ordered  Rationale for BMI follow-up plan is due to patient being overweight or obese  Depression Screening and Follow-up Plan: Patient was screened for depression during today's encounter  They screened negative with a PHQ-2 score of 2  Anxiety  Patient episode of anxiety  Denies being suicidal   Was seen in the emergency room at [facility]  Feels he needs a little bit more guidance  Came to the office  Discussed with patient is okay to take amoxicillin and ibuprofen as needed for dental pain  Take BuSpar 3 times a day for the next few days  Referral made to Psychiatry for therapy  Note given for work  Discussed self-care  Discussed maintaining safety, patient states that she is not suicidal   Will follow-up next         Problem List Items Addressed This Visit        Other    Anxiety - Primary     Patient episode of anxiety  Denies being suicidal   Was seen in the emergency room at [facility]  Feels he needs a little bit more guidance  Came to the office  Discussed with patient is okay to take amoxicillin and ibuprofen as needed for dental pain  Take BuSpar 3 times a day for the next few days  Referral made to Psychiatry for therapy  Note given for work  Discussed self-care    Discussed maintaining safety, patient states that she is not suicidal   Will follow-up next           Relevant Medications    ALPRAZolam (XANAX) 0 5 mg tablet    Other Relevant Orders    Ambulatory Referral to Psychiatry            Subjective:      Patient ID: Lindsay Colvin is a 63 y o  female  Patient is being seen for follow-up anxiety  Patient went to  [facility] states that she was complaining of chest   Cardiac workup was negative  sheIs very anxious  States she feels overwhelmed  Denies being suicidal   Has been taking amoxicillin and ibuprofen for a dental problem  Also has hydrocodone states that she just wanted at night not comfortable taking the medication does Xanax as needed acute episodes of anxiety and also takes these but they    Anxiety  Symptoms include nervous/anxious behavior  Patient reports no suicidal ideas  The following portions of the patient's history were reviewed and updated as appropriate: allergies, current medications, past family history, past medical history, past social history, past surgical history and problem list     Review of Systems   Psychiatric/Behavioral: Positive for dysphoric mood  Negative for self-injury and suicidal ideas  The patient is nervous/anxious  Objective:      /80   Pulse 94   Temp 98 4 °F (36 9 °C) (Tympanic)   Resp 18   Ht 5' 3" (1 6 m)   Wt 73 kg (161 lb)   SpO2 97%   BMI 28 52 kg/m²          Physical Exam  Psychiatric:         Attention and Perception: Attention and perception normal          Mood and Affect: Mood is anxious  Affect is tearful           Speech: Speech normal          Behavior: Behavior normal          Cognition and Memory: Cognition and memory normal

## 2022-06-01 ENCOUNTER — OFFICE VISIT (OUTPATIENT)
Dept: FAMILY MEDICINE CLINIC | Facility: CLINIC | Age: 62
End: 2022-06-01
Payer: COMMERCIAL

## 2022-06-01 VITALS
HEIGHT: 63 IN | BODY MASS INDEX: 28.95 KG/M2 | WEIGHT: 163.4 LBS | OXYGEN SATURATION: 97 % | TEMPERATURE: 98.3 F | SYSTOLIC BLOOD PRESSURE: 132 MMHG | DIASTOLIC BLOOD PRESSURE: 78 MMHG | HEART RATE: 72 BPM

## 2022-06-01 DIAGNOSIS — M72.2 PLANTAR FASCIITIS: Primary | ICD-10-CM

## 2022-06-01 PROCEDURE — 99214 OFFICE O/P EST MOD 30 MIN: CPT | Performed by: NURSE PRACTITIONER

## 2022-06-01 PROCEDURE — 3078F DIAST BP <80 MM HG: CPT | Performed by: NURSE PRACTITIONER

## 2022-06-01 PROCEDURE — 1036F TOBACCO NON-USER: CPT | Performed by: NURSE PRACTITIONER

## 2022-06-01 PROCEDURE — 3008F BODY MASS INDEX DOCD: CPT | Performed by: NURSE PRACTITIONER

## 2022-06-01 PROCEDURE — 3075F SYST BP GE 130 - 139MM HG: CPT | Performed by: NURSE PRACTITIONER

## 2022-06-01 NOTE — PATIENT INSTRUCTIONS
Follow-up with Podiatry   Continue the BuSpar twice a day continue Xanax as needed for acute episodes of anxiety make sure you eating okay sleeping okay lots of fluids follow-up with make

## 2022-06-01 NOTE — ASSESSMENT & PLAN NOTE
Patient is here for follow-up on anxiety  Reports that he she has been taking BuSpar twice a day and also Xanax as needed for acute episodes  Does feel some improvement  Has been doing some self-care  Has  Started a garden  Has appointment with therapy    Continue medication continue self-care

## 2022-06-01 NOTE — PROGRESS NOTES
Assessment/Plan:     Anxiety    Patient is here for follow-up on anxiety  Reports that he she has been taking BuSpar twice a day and also Xanax as needed for acute episodes  Does feel some improvement  Has been doing some self-care  Has  Started a garden  Has appointment with therapy  Continue medication continue self-care    Plantar fasciitis   Reports increased episodes of foot pain  Referral made to Podiatry for further management         Problem List Items Addressed This Visit        Musculoskeletal and Integument    Plantar fasciitis - Primary      Reports increased episodes of foot pain  Referral made to Podiatry for further management           Relevant Orders    Ambulatory Referral to Podiatry            Subjective:      Patient ID: Samaria Napoles is a 63 y o  female  Patient is here for follow-up on acute episodes of anxiety  Has been taking the BuSpar twice a day  Did take Xanax once  Did make an appointment to see therapy  Feels some improvement  Continues to have some depression, her dog has cancer and she is very depressed about the possible outcome  Continues to work  Reports that she did start gardening  The following portions of the patient's history were reviewed and updated as appropriate: allergies, current medications, past family history, past medical history, past social history, past surgical history and problem list     Review of Systems   Constitutional: Negative  HENT: Negative  Eyes: Negative  Respiratory: Negative  Cardiovascular: Negative  Gastrointestinal: Negative  Endocrine: Negative  Genitourinary: Negative  Musculoskeletal: Negative  Allergic/Immunologic: Negative  Neurological: Negative  Psychiatric/Behavioral: Positive for dysphoric mood  The patient is nervous/anxious            Objective:      /78   Pulse 72   Temp 98 3 °F (36 8 °C)   Ht 5' 3" (1 6 m)   Wt 74 1 kg (163 lb 6 4 oz)   SpO2 97%   BMI 28 95 kg/m² Physical Exam  Vitals and nursing note reviewed  Constitutional:       Appearance: Normal appearance  She is well-developed  HENT:      Head: Normocephalic and atraumatic  Cardiovascular:      Rate and Rhythm: Normal rate and regular rhythm  Pulses: Normal pulses  Heart sounds: Normal heart sounds  Pulmonary:      Effort: Pulmonary effort is normal       Breath sounds: Normal breath sounds  Musculoskeletal:         General: Normal range of motion  Cervical back: Normal range of motion  Skin:     General: Skin is warm and dry  Neurological:      Mental Status: She is alert and oriented to person, place, and time  Psychiatric:         Mood and Affect: Mood normal          Behavior: Behavior normal          Thought Content:  Thought content normal          Judgment: Judgment normal

## 2022-06-10 ENCOUNTER — SOCIAL WORK (OUTPATIENT)
Dept: BEHAVIORAL/MENTAL HEALTH CLINIC | Facility: CLINIC | Age: 62
End: 2022-06-10
Payer: COMMERCIAL

## 2022-06-10 DIAGNOSIS — F41.9 ANXIETY: Primary | ICD-10-CM

## 2022-06-10 PROCEDURE — 90791 PSYCH DIAGNOSTIC EVALUATION: CPT | Performed by: SOCIAL WORKER

## 2022-06-10 NOTE — PSYCH
Assessment/Plan: f/u in a few weeks     There are no diagnoses linked to this encounter  Subjective: Therapist met w/pt who is a 63year old female due to increased anxiety  Pt reports that she has always struggled w/anxiety and was prescribed medication in the past for it  She stated she did engage in therapy about 3 years ago but that was more in regards to when she lost her   Pt's  of 22 years  3 years ago and reports that her symptoms have been heightened since  She reports she has felt more isolated and alone due to his family distancing themselves from her  She stated about 2 weeks ago she felt like she was having a heart attack on her way to work due to having intense chest pain but there was no medical explanation found  She stated that the past month has been difficult in terms of anniversaries and how it brought up a lot of feelings for her  She reports her two main sources of support are her sister who resides in [place] and her significant other  She currently works for [employer] and has done so for 22 years  Patient ID: Sierra Wasserman is a 63 y o  female  HPI: 39 minutes    Pre-morbid level of function and History of Present Illness: Stemming from childhood      Previous Psychiatric/psychological treatment/year: Doesn't recall name but located near [facility]  Current Psychiatrist/Therapist:   Outpatient and/or Partial and Other [employer] Used (CTT, ICM, VNA): N/A      Problem Assessment: "I want these nightmares to go away "    SOCIAL/VOCATION:  Family Constellation (include parents, relationship with each and pertinent Psych/Medical History):     Family History   Problem Relation Age of Onset    Heart attack Mother         MI    Heart attack Father         MI    Migraines Sister     Heart attack Brother         MI       Mother: unknown  Spouse: unknown  Father: unknown  Children: unknown   Sibling: unknown  Sibling: unknown  Children: unknown  Other: rupaliwjohn Key relates best to "not sure  "  she lives with boyfriend  she does not live alone  Domestic Violence: No past history of domestic violence    Additional Comments related to family/relationships/peer support:closest to her sister but she resides in [place]  Pt reports feeling lonely  School or Work History (strengths/limitations/needs): Determined, goal oriented  Doesn't like to be alone    Her highest grade level achieved was 10th grade  GED     history includes N/A    Financial status includes income    LEISURE ASSESSMENT (Include past and present hobbies/interests and level of involvement (Ex: Group/Club Affiliations): water skiing, enjoys going out on a boat  her primary language is [place]  Preferred language is [place]  Ethnic considerations are n/a  Religions affiliations and level of involvement n/a   Does spirituality help you cope? No    FUNCTIONAL STATUS: There has been a recent change in Yesi ability to do the following: does not need can service    Level of Assistance Needed/By Whom?:     Yesi learns best by  demonstration         SUBSTANCE ABUSE ASSESSMENT: no substance abuse    Substance/Route/Age/Amount/Frequency/Last Use:     DETOX HISTORY: N/A    Previous detox/rehab treatment: n/a    HEALTH ASSESSMENT: no referral to PCP needed    LEGAL: No Mental Health Advance Directive or Power of  on file    Prenatal History: N/A    Delivery History: N/A    Developmental Milestones: N/A  Temperament as an infant was N/A  Temperament as a toddler was N/A  Temperament at school age was N/A  Temperament as a teenager was N/A      Risk Assessment:   The following ratings are based on my interview(s) with pt    Risk of Harm to Self:   Demographic risk factors include   Historical Risk Factors include none  Recent Specific Risk Factors include none  Additional Factors for a Child or Adolescent n/a    Risk of Harm to Others:   Demographic Risk Factors include none  Historical Risk Factors include none  Recent Specific Risk Factors include none    Access to Weapons:   Nayana Stephens has access to the following weapons: none   The following steps have been taken to ensure weapons are properly secured: n/a    Based on the above information, the client presents the following risk of harm to self or others:  low    The following interventions are recommended:   no intervention changes    Notes regarding this Risk Assessment: low risk        Review Of Systems:     Mood Anxiety   Behavior Normal    Thought Content Normal   General Normal    Personality Normal   Other Psych Symptoms Normal   Constitutional Normal   ENT unknown   Cardiovascular unknown   Respiratory unknown   Gastrointestinal unknown   Genitourinary unknown   Musculoskeletal unknown   Integumentary unknown   Neurological unknown   Endocrine unknown         Mental status:  Appearance anxious   Mood mood appropriate   Affect affect appropriate    Speech fluent   Thought Processes normal thought processes   Hallucinations no hallucinations present    Thought Content no delusions   Abnormal Thoughts no suicidal thoughts    Orientation  oriented to person   Remote Memory short term memory intact   Attention Span concentration intact   Intellect unknown   Fund of Knowledge displays adequate knowledge of current events   [employer] intact   Judgement judgment was intact   Muscle Strength unknown   Language no difficulty naming common objects   Pain none   Pain Scale unknown

## 2022-06-11 DIAGNOSIS — I10 BENIGN HYPERTENSION: ICD-10-CM

## 2022-06-11 DIAGNOSIS — F41.9 ANXIETY: ICD-10-CM

## 2022-06-11 RX ORDER — AMLODIPINE BESYLATE 5 MG/1
TABLET ORAL
Qty: 30 TABLET | Refills: 0 | Status: SHIPPED | OUTPATIENT
Start: 2022-06-11 | End: 2022-07-10

## 2022-06-13 RX ORDER — BUSPIRONE HYDROCHLORIDE 15 MG/1
TABLET ORAL
Qty: 90 TABLET | Refills: 1 | Status: SHIPPED | OUTPATIENT
Start: 2022-06-13

## 2022-07-10 DIAGNOSIS — I10 BENIGN HYPERTENSION: ICD-10-CM

## 2022-07-10 RX ORDER — AMLODIPINE BESYLATE 5 MG/1
TABLET ORAL
Qty: 30 TABLET | Refills: 0 | Status: SHIPPED | OUTPATIENT
Start: 2022-07-10 | End: 2022-08-11

## 2022-07-12 ENCOUNTER — SOCIAL WORK (OUTPATIENT)
Dept: BEHAVIORAL/MENTAL HEALTH CLINIC | Facility: CLINIC | Age: 62
End: 2022-07-12
Payer: COMMERCIAL

## 2022-07-12 ENCOUNTER — OFFICE VISIT (OUTPATIENT)
Dept: FAMILY MEDICINE CLINIC | Facility: CLINIC | Age: 62
End: 2022-07-12
Payer: COMMERCIAL

## 2022-07-12 VITALS
TEMPERATURE: 98.6 F | WEIGHT: 164.4 LBS | OXYGEN SATURATION: 97 % | HEIGHT: 63 IN | DIASTOLIC BLOOD PRESSURE: 78 MMHG | SYSTOLIC BLOOD PRESSURE: 130 MMHG | BODY MASS INDEX: 29.13 KG/M2 | HEART RATE: 81 BPM

## 2022-07-12 DIAGNOSIS — Z00.00 HEALTHCARE MAINTENANCE: ICD-10-CM

## 2022-07-12 DIAGNOSIS — F33.1 MODERATE EPISODE OF RECURRENT MAJOR DEPRESSIVE DISORDER (HCC): Primary | ICD-10-CM

## 2022-07-12 DIAGNOSIS — Z12.31 ENCOUNTER FOR SCREENING MAMMOGRAM FOR MALIGNANT NEOPLASM OF BREAST: ICD-10-CM

## 2022-07-12 DIAGNOSIS — F41.9 ANXIETY: ICD-10-CM

## 2022-07-12 DIAGNOSIS — I10 BENIGN HYPERTENSION: ICD-10-CM

## 2022-07-12 PROBLEM — F11.20 CONTINUOUS OPIOID DEPENDENCE (HCC): Status: RESOLVED | Noted: 2022-05-25 | Resolved: 2022-07-12

## 2022-07-12 PROBLEM — N91.2 AMENORRHEA: Status: RESOLVED | Noted: 2020-03-10 | Resolved: 2022-07-12

## 2022-07-12 PROCEDURE — 90832 PSYTX W PT 30 MINUTES: CPT | Performed by: SOCIAL WORKER

## 2022-07-12 PROCEDURE — 3078F DIAST BP <80 MM HG: CPT | Performed by: NURSE PRACTITIONER

## 2022-07-12 PROCEDURE — 3075F SYST BP GE 130 - 139MM HG: CPT | Performed by: NURSE PRACTITIONER

## 2022-07-12 PROCEDURE — 99214 OFFICE O/P EST MOD 30 MIN: CPT | Performed by: NURSE PRACTITIONER

## 2022-07-12 RX ORDER — CITALOPRAM 10 MG/1
10 TABLET ORAL DAILY
Qty: 30 TABLET | Refills: 0 | Status: SHIPPED | OUTPATIENT
Start: 2022-07-12 | End: 2022-08-13

## 2022-07-12 NOTE — PROGRESS NOTES
Assessment/Plan:     Chronic Problems:  Benign hypertension  BP well controlled  Continue amlodipine  Anxiety  Will start on celexa daily  Continue buspar daily  Xanax sparingly as needed  Moderate episode of recurrent major depressive disorder [facility])  Discussed all with patient  Advised to continue counseling  Will start on celexa daily  Will return in 1 month  Visit Diagnosis:  Diagnoses and all orders for this visit:    Moderate episode of recurrent major depressive disorder (HCC)  -     citalopram (CeleXA) 10 mg tablet; Take 1 tablet (10 mg total) by mouth daily    Encounter for screening mammogram for malignant neoplasm of breast  -     Mammo screening bilateral w 3d & cad; Future    Benign hypertension    Anxiety    Healthcare maintenance  -     CBC and differential; Future  -     Comprehensive metabolic panel; Future  -     Lipid panel; Future  -     TSH, 3rd generation with Free T4 reflex; Future          Subjective:    Patient ID: Say Reed is a 63 y o  female  Patient presents for routine follow up  Depression is 2/10, no motivation, wants to lay on the couch all day  She works for area [employer]  Very depressed since her  passed 3 5 years ago  Takes buspar every morning  Taking xanax sparingly as needed, couple times a week  The following portions of the patient's history were reviewed and updated as appropriate: allergies, current medications, past family history, past medical history, past social history, past surgical history and problem list     Review of Systems   Constitutional: Negative for chills and fever  HENT: Negative for ear pain and sore throat  Eyes: Negative for pain and visual disturbance  Respiratory: Negative for cough and shortness of breath  Cardiovascular: Negative for chest pain and palpitations  Gastrointestinal: Negative for abdominal pain and vomiting  Genitourinary: Negative for dysuria and hematuria     Musculoskeletal: Negative for arthralgias and back pain  Skin: Negative for color change and rash  Neurological: Negative for seizures and syncope  Psychiatric/Behavioral: Positive for dysphoric mood and sleep disturbance  The patient is nervous/anxious  All other systems reviewed and are negative  /78 (BP Location: Left arm, Patient Position: Sitting, Cuff Size: Standard)   Pulse 81   Temp 98 6 °F (37 °C) (Tympanic)   Ht 5' 3" (1 6 m)   Wt 74 6 kg (164 lb 6 4 oz)   SpO2 97%   BMI 29 12 kg/m²   Social History     Socioeconomic History    Marital status: /Civil Union     Spouse name: Not on file    Number of children: Not on file    Years of education: Not on file    Highest education level: Not on file   Occupational History    Occupation: employed   Tobacco Use    Smoking status: Former Smoker    Smokeless tobacco: Never Used   Substance and Sexual Activity    Alcohol use: Yes     Comment: social    Drug use: No    Sexual activity: Not on file   Other Topics Concern    Not on file   Social History Narrative    Always uses seatbelt    Lives with spouse    No caffeine use     Social Determinants of Health     Financial Resource Strain: Not on file   Food Insecurity: Not on file   Transportation Needs: Not on file   Physical Activity: Not on file   Stress: Not on file   Social Connections: Not on file   Intimate Partner Violence: Not on file   Housing Stability: Not on file     Past Medical History:   Diagnosis Date    Depression with anxiety 11/8/2018     Family History   Problem Relation Age of Onset    Heart attack Mother         MI    Heart attack Father         MI    Migraines Sister     Heart attack Brother         MI     History reviewed  No pertinent surgical history      Current Outpatient Medications:     ALPRAZolam (XANAX) 0 5 mg tablet, Take 1 tab every 12 hrs  for acute anxiety, Disp: 30 tablet, Rfl: 0    amLODIPine (NORVASC) 5 mg tablet, TAKE 1 TABLET BY MOUTH EVERY DAY, Disp: 30 tablet, Rfl: 0    busPIRone (BUSPAR) 15 mg tablet, TAKE 1 TABLET BY MOUTH THREE TIMES A DAY AS NEEDED FOR ANXIETY, Disp: 90 tablet, Rfl: 1    cholecalciferol (VITAMIN D3) 1,000 units tablet, Take 2 tablets (2,000 Units total) by mouth daily, Disp: 60 tablet, Rfl: 3    citalopram (CeleXA) 10 mg tablet, Take 1 tablet (10 mg total) by mouth daily, Disp: 30 tablet, Rfl: 0    Allergies   Allergen Reactions    Meloxicam Throat Swelling and Anaphylaxis    Metaxalone Throat Swelling and Anaphylaxis          Lab Review   No visits with results within 2 Month(s) from this visit  Latest known visit with results is:   Annual Exam on 01/04/2022   Component Date Value    Case Report 01/04/2022                      Value:Gynecologic Cytology Report                       Case: Celinda Riedel Provider:  MELECIO Ellis Collected:           01/04/2022 1713              Ordering Location:     [facility]     Received:            01/04/2022 [address] [place] [address]                                                         Specimen:    LIQUID-BASED PAP, SCREENING, Cervix, Endocervical                                          Primary Interpretation 01/04/2022 Negative for intraepithelial lesion or malignancy     Specimen Adequacy 01/04/2022 Satisfactory for evaluation  Absence of endocervical/transformation zone component   Additional Information 01/04/2022                      Value: This result contains rich text formatting which cannot be displayed here      LMP 01/04/2022 6/4/2019     HPV Other HR 01/04/2022 Negative     HPV16 01/04/2022 Negative     HPV18 01/04/2022 Negative         Imaging: No results found     Objective:     Physical Exam  Constitutional:       Appearance: She is well-developed  Cardiovascular:      Rate and Rhythm: Normal rate and regular rhythm  Heart sounds: Normal heart sounds  No murmur heard  Pulmonary:      Effort: Pulmonary effort is normal  No respiratory distress  Breath sounds: Normal breath sounds  Skin:     General: Skin is warm and dry  Neurological:      Mental Status: She is alert and oriented to person, place, and time  There are no Patient Instructions on file for this visit  MELECIO Durant    Portions of the record may have been created with voice recognition software  Occasional wrong word or "sound a like" substitutions may have occurred due to the inherent limitations of voice recognition software  Read the chart carefully and recognize, using context, where substitutions have occurred

## 2022-07-12 NOTE — ASSESSMENT & PLAN NOTE
Discussed all with patient  Advised to continue counseling  Will start on celexa daily  Will return in 1 month

## 2022-07-14 NOTE — PSYCH
3:20pm-3:50pm    Assessment/Plan: f/u in three weeks     There are no diagnoses linked to this encounter  Subjective: Therapist met w/pt for individual session  Pt stated that she hasn't had any panic attacks since last visit  Pt stated that last week was stressful due to some unexpected things occurring at home  She shared that this past weekend was difficult emotionally bc she saw something on social media which immediately shifted her mood  Therapist continued to process the anger/resentments she has towards her husbands family especially right after he   Therapist and pt discussed strategies she can work on utilizing when she begins to feel emotionally overwhelmed  Patient ID: Ruth Ann Rodríguez is a 63 y o  female  HPI 30 minutes    Review of Systems      Objective: Pt appeared to be fiona good mood  Physical Exam  Pt denied any SI/Hi

## 2022-07-21 ENCOUNTER — HOSPITAL ENCOUNTER (OUTPATIENT)
Dept: MAMMOGRAPHY | Facility: CLINIC | Age: 62
Discharge: HOME | End: 2022-07-21
Payer: COMMERCIAL

## 2022-07-21 DIAGNOSIS — Z12.31 ENCOUNTER FOR SCREENING MAMMOGRAM FOR MALIGNANT NEOPLASM OF BREAST: ICD-10-CM

## 2022-07-21 PROCEDURE — 77063 BREAST TOMOSYNTHESIS BI: CPT

## 2022-07-21 PROCEDURE — 77067 SCR MAMMO BI INCL CAD: CPT

## 2022-07-25 ENCOUNTER — TELEPHONE (OUTPATIENT)
Dept: FAMILY MEDICINE CLINIC | Facility: CLINIC | Age: 62
End: 2022-07-25

## 2022-07-25 NOTE — TELEPHONE ENCOUNTER
Pt called - asking for a call back regards US breast right limited and Mammo diagnostic right w 3d & cad  Please contact pt to your earliest convenience  Pt can be reached at [phone]  Please advise

## 2022-07-26 ENCOUNTER — TELEPHONE (OUTPATIENT)
Dept: FAMILY MEDICINE CLINIC | Facility: CLINIC | Age: 62
End: 2022-07-26

## 2022-07-26 NOTE — TELEPHONE ENCOUNTER
Called and spoke with patient regarding mammogram  Patient was concerned as she stated she received a phone call from the breast center requesting further imaging  Concerned that they scheduled her 2 weeks out  Reassured patient and answered all questions 
Statement Selected

## 2022-07-27 NOTE — TELEPHONE ENCOUNTER
Signed out  Conversation  (Oldest Message First)    Tabby Cruzchristi, [address]         7/26/22 11:57 AM  Note  Called and spoke with patient regarding mammogram  Patient was concerned as she stated she received a phone call from the breast center requesting further imaging  Concerned that they scheduled her 2 weeks out  Reassured patient and answered all questions

## 2022-07-28 DIAGNOSIS — F41.9 ANXIETY: ICD-10-CM

## 2022-07-29 RX ORDER — ALPRAZOLAM 0.5 MG/1
TABLET ORAL
Qty: 30 TABLET | Refills: 0 | Status: SHIPPED | OUTPATIENT
Start: 2022-07-29 | End: 2022-09-27 | Stop reason: SDUPTHER

## 2022-08-01 ENCOUNTER — SOCIAL WORK (OUTPATIENT)
Dept: BEHAVIORAL/MENTAL HEALTH CLINIC | Facility: CLINIC | Age: 62
End: 2022-08-01
Payer: COMMERCIAL

## 2022-08-01 ENCOUNTER — OFFICE VISIT (OUTPATIENT)
Dept: FAMILY MEDICINE CLINIC | Facility: CLINIC | Age: 62
End: 2022-08-01
Payer: COMMERCIAL

## 2022-08-01 VITALS
OXYGEN SATURATION: 98 % | SYSTOLIC BLOOD PRESSURE: 132 MMHG | BODY MASS INDEX: 29.59 KG/M2 | WEIGHT: 167 LBS | DIASTOLIC BLOOD PRESSURE: 82 MMHG | TEMPERATURE: 98.4 F | HEIGHT: 63 IN | HEART RATE: 91 BPM

## 2022-08-01 DIAGNOSIS — F33.1 MODERATE EPISODE OF RECURRENT MAJOR DEPRESSIVE DISORDER (HCC): Primary | ICD-10-CM

## 2022-08-01 DIAGNOSIS — F41.9 ANXIETY: Primary | ICD-10-CM

## 2022-08-01 DIAGNOSIS — F33.1 MODERATE EPISODE OF RECURRENT MAJOR DEPRESSIVE DISORDER (HCC): ICD-10-CM

## 2022-08-01 PROCEDURE — 3075F SYST BP GE 130 - 139MM HG: CPT | Performed by: NURSE PRACTITIONER

## 2022-08-01 PROCEDURE — 90834 PSYTX W PT 45 MINUTES: CPT | Performed by: SOCIAL WORKER

## 2022-08-01 PROCEDURE — 3725F SCREEN DEPRESSION PERFORMED: CPT | Performed by: NURSE PRACTITIONER

## 2022-08-01 PROCEDURE — 3079F DIAST BP 80-89 MM HG: CPT | Performed by: NURSE PRACTITIONER

## 2022-08-01 PROCEDURE — 99214 OFFICE O/P EST MOD 30 MIN: CPT | Performed by: NURSE PRACTITIONER

## 2022-08-01 NOTE — ASSESSMENT & PLAN NOTE
Patient is not sure if she wants to start on Celexa  Advised to call if she decides she does want to start on Celexa and will follow-up 1 month after starting

## 2022-08-01 NOTE — PSYCH
4:00pm-4:45pm    Assessment/Plan: f/u in one month  Pt wants to call her insurance to see how many sessions she is covered for to avoid financial stressors  There are no diagnoses linked to this encounter  Subjective: Therapist met w/pt for individual session  Pt stated she has felt heightened symptoms due to there possibly being a mass on her breast   Pt stated that she couldn't get the testing done until 8/12 due to that being the first appointment they had available  Pt stated she has been in her head and just trying to distract herself as much as she can  Therapist and pt discussed different strategies she can continue to work on implementing to help manage her anxious thoughts  Pt stated that she hasn't started taking the medications prescribed to her because of the anxiety she has about it  Pt stated she wants to make some behavioral strategies first   Pt stated she is upset she has gained weight and wants to take better care of herself  Therapist explored this further w/pt and pt stated that she wants to start exercising  Pt stated she wants to start exercising at least 2x/week to see if that helps her feel better both physically and emotionally  Patient ID: Monse Augustine is a 63 y o  female  HPI 45 minutes    Review of Systems      Objective: Pt appeared to be in a good mood and was easily engaged         Physical Exam  Pt denied any SI/HI/AH/VH

## 2022-08-01 NOTE — PROGRESS NOTES
Assessment/Plan:     Chronic Problems: Moderate episode of recurrent major depressive disorder [facility])  Patient is not sure if she wants to start on Celexa  Advised to call if she decides she does want to start on Celexa and will follow-up 1 month after starting  Visit Diagnosis:  Diagnoses and all orders for this visit:    Moderate episode of recurrent major depressive disorder (HCC)          Subjective:    Patient ID: Pablo Pugh is a 63 y o  female  Patient presents for follow-up on Celexa  Patient states that she picked up the medication from the pharmacy and read the list of side effects  She was worried about side effects so didn't take the medication  Taking buspar twice daily  She asked how she knows if she is depressed or not  She explained at her last visit that she had no motivation and felt depressed  Depression--- 6/10, anxiety is manageable  She is very upset to have to wait for breast imaging as she had incomplete breast imaging with her mammogram   She is scheduled for a diagnostic mammogram and ultrasound in 2 weeks  She is very upset that she has to wait, thinking that she may have cancer  The following portions of the patient's history were reviewed and updated as appropriate: allergies, current medications, past family history, past medical history, past social history, past surgical history and problem list     Review of Systems   Constitutional: Negative for chills and fever  HENT: Negative for ear pain and sore throat  Eyes: Negative for pain and visual disturbance  Respiratory: Negative for cough and shortness of breath  Cardiovascular: Negative for chest pain and palpitations  Gastrointestinal: Negative for abdominal pain and vomiting  Genitourinary: Negative for dysuria and hematuria  Musculoskeletal: Negative for arthralgias and back pain  Skin: Negative for color change and rash  Neurological: Negative for seizures and syncope  Psychiatric/Behavioral: Positive for dysphoric mood  The patient is not nervous/anxious  All other systems reviewed and are negative  /82   Pulse 91   Temp 98 4 °F (36 9 °C)   Ht 5' 3" (1 6 m)   Wt 75 8 kg (167 lb)   SpO2 98%   BMI 29 58 kg/m²   Social History     Socioeconomic History    Marital status: /Civil Union     Spouse name: Not on file    Number of children: Not on file    Years of education: Not on file    Highest education level: Not on file   Occupational History    Occupation: employed   Tobacco Use    Smoking status: Former Smoker    Smokeless tobacco: Never Used   Substance and Sexual Activity    Alcohol use: Yes     Comment: social    Drug use: No    Sexual activity: Not on file   Other Topics Concern    Not on file   Social History Narrative    Always uses seatbelt    Lives with spouse    No caffeine use     Social Determinants of Health     Financial Resource Strain: Not on file   Food Insecurity: Not on file   Transportation Needs: Not on file   Physical Activity: Not on file   Stress: Not on file   Social Connections: Not on file   Intimate Partner Violence: Not on file   Housing Stability: Not on file     Past Medical History:   Diagnosis Date    Depression with anxiety 11/8/2018     Family History   Problem Relation Age of Onset    Heart attack Mother         MI    Heart attack Father         MI    Migraines Sister     Heart attack Brother         MI    Breast cancer Neg Hx      No past surgical history on file      Current Outpatient Medications:     ALPRAZolam (XANAX) 0 5 mg tablet, TAKE 1 TAB EVERY 12 HRS FOR ACUTE ANXIETY, Disp: 30 tablet, Rfl: 0    amLODIPine (NORVASC) 5 mg tablet, TAKE 1 TABLET BY MOUTH EVERY DAY, Disp: 30 tablet, Rfl: 0    busPIRone (BUSPAR) 15 mg tablet, TAKE 1 TABLET BY MOUTH THREE TIMES A DAY AS NEEDED FOR ANXIETY, Disp: 90 tablet, Rfl: 1    cholecalciferol (VITAMIN D3) 1,000 units tablet, Take 2 tablets (2,000 Units total) by mouth daily, Disp: 60 tablet, Rfl: 3    citalopram (CeleXA) 10 mg tablet, Take 1 tablet (10 mg total) by mouth daily, Disp: 30 tablet, Rfl: 0    Allergies   Allergen Reactions    Meloxicam Throat Swelling and Anaphylaxis    Metaxalone Throat Swelling and Anaphylaxis          Lab Review   No visits with results within 2 Month(s) from this visit  Latest known visit with results is:   Annual Exam on 01/04/2022   Component Date Value    Case Report 01/04/2022                      Value:Gynecologic Cytology Report                       Case: Andre Go Provider:  MELECIO Wilson Collected:           01/04/2022 1713              Ordering Location:     [facility]     Received:            01/04/2022 1713                                     [place], [place]                                                         Specimen:    LIQUID-BASED PAP, SCREENING, Cervix, Endocervical                                          Primary Interpretation 01/04/2022 Negative for intraepithelial lesion or malignancy     Specimen Adequacy 01/04/2022 Satisfactory for evaluation  Absence of endocervical/transformation zone component   Additional Information 01/04/2022                      Value: This result contains rich text formatting which cannot be displayed here   LMP 01/04/2022 6/4/2019     HPV Other HR 01/04/2022 Negative     HPV16 01/04/2022 Negative     HPV18 01/04/2022 Negative         Imaging: Mammo screening bilateral w 3d & cad    Result Date: 7/25/2022  Narrative: DIAGNOSIS: Encounter for screening mammogram for malignant neoplasm of breast TECHNIQUE: Digital screening mammography was performed   Computer Aided Detection (CAD) analyzed all applicable images  COMPARISONS: Prior breast imaging dated: 01/13/2021, 09/03/2019, 08/28/2018, 08/25/2017, 12/16/2014, 12/10/2013, and 02/09/2010 RELEVANT HISTORY: Family Breast Cancer History: History of breast cancer in Neg Hx  Family Medical History: No known relevant family medical history  Personal History: No known relevant hormone history  No known relevant surgical history  No known relevant medical history  The patient is scheduled in a reminder system for screening mammography  8-10% of cancers will be missed on mammography  Management of a palpable abnormality must be based on clinical grounds  Patients will be notified of their results via letter from our facility  Accredited by [employer] of [employer] and FDA  RISK ASSESSMENT: 5 Year Tyrer-Cuzick: 1 75 % 10 Year Tyrer-Cuzick: 3 66 % Lifetime Tyrer-Cuzick: 9 11 % TISSUE DENSITY: There are scattered areas of fibroglandular density  INDICATION: Jacques Darling is a 63 y o  female presenting for screening mammography  FINDINGS: RIGHT A) FOCAL ASYMMETRY: There is a focal asymmetry seen in the upper outer quadrant of the right breast at 10 o'clock in the middle depth on the MLO and CC views  This was not seen with certainty on prior studies, although possibly obscured by surrounding fibroglandular tissue  Diagnostic mammography, with possible ultrasound, is recommended to ensure this is nothing more then summation artifact  Left There are no suspicious masses, grouped microcalcifications or areas of unexplained architectural distortion  The skin and nipple areolar complex are unremarkable  Impression: Additional imaging required   A breast health care nurse from our facility will be contacting the patient regarding the need for additional imaging ASSESSMENT/BI-RADS CATEGORY: Left: 1 - Negative Right: 0 - Incomplete: Needs Additional Imaging Evaluation Overall: 0 - Incomplete: Needs Additional Imaging Evaluation RECOMMENDATION:      - Diagnostic mammogram at the current time for the right breast       - Ultrasound at the current time for the right breast       - Routine screening mammogram in 1 year for the left breast  Workstation ID: [id number]       Objective:     Physical Exam  Constitutional:       Appearance: She is well-developed  Cardiovascular:      Rate and Rhythm: Normal rate and regular rhythm  Heart sounds: Normal heart sounds  No murmur heard  Pulmonary:      Effort: Pulmonary effort is normal  No respiratory distress  Breath sounds: Normal breath sounds  Skin:     General: Skin is warm and dry  Neurological:      Mental Status: She is alert and oriented to person, place, and time  Psychiatric:         Mood and Affect: Affect is flat  There are no Patient Instructions on file for this visit  MELECIO Durant    Portions of the record may have been created with voice recognition software  Occasional wrong word or "sound a like" substitutions may have occurred due to the inherent limitations of voice recognition software  Read the chart carefully and recognize, using context, where substitutions have occurred

## 2022-08-11 DIAGNOSIS — I10 BENIGN HYPERTENSION: ICD-10-CM

## 2022-08-11 RX ORDER — AMLODIPINE BESYLATE 5 MG/1
TABLET ORAL
Qty: 30 TABLET | Refills: 0 | Status: SHIPPED | OUTPATIENT
Start: 2022-08-11 | End: 2022-09-11

## 2022-08-12 ENCOUNTER — HOSPITAL ENCOUNTER (OUTPATIENT)
Dept: ULTRASOUND IMAGING | Facility: CLINIC | Age: 62
Discharge: HOME | End: 2022-08-12
Payer: COMMERCIAL

## 2022-08-12 ENCOUNTER — HOSPITAL ENCOUNTER (OUTPATIENT)
Dept: MAMMOGRAPHY | Facility: CLINIC | Age: 62
Discharge: HOME | End: 2022-08-12
Payer: COMMERCIAL

## 2022-08-12 VITALS — BODY MASS INDEX: 29.59 KG/M2 | HEIGHT: 63 IN | WEIGHT: 167 LBS

## 2022-08-12 DIAGNOSIS — R92.8 ABNORMAL SCREENING MAMMOGRAM: ICD-10-CM

## 2022-08-12 PROCEDURE — G0279 TOMOSYNTHESIS, MAMMO: HCPCS

## 2022-08-12 PROCEDURE — 77065 DX MAMMO INCL CAD UNI: CPT

## 2022-08-13 DIAGNOSIS — F33.1 MODERATE EPISODE OF RECURRENT MAJOR DEPRESSIVE DISORDER (HCC): ICD-10-CM

## 2022-08-13 RX ORDER — CITALOPRAM 10 MG/1
TABLET ORAL
Qty: 30 TABLET | Refills: 0 | Status: SHIPPED | OUTPATIENT
Start: 2022-08-13

## 2022-09-11 DIAGNOSIS — I10 BENIGN HYPERTENSION: ICD-10-CM

## 2022-09-11 RX ORDER — AMLODIPINE BESYLATE 5 MG/1
TABLET ORAL
Qty: 30 TABLET | Refills: 0 | Status: SHIPPED | OUTPATIENT
Start: 2022-09-11 | End: 2022-10-27 | Stop reason: SDUPTHER

## 2022-09-27 DIAGNOSIS — F41.9 ANXIETY: ICD-10-CM

## 2022-09-27 DIAGNOSIS — I10 BENIGN HYPERTENSION: ICD-10-CM

## 2022-09-27 RX ORDER — AMLODIPINE BESYLATE 5 MG/1
TABLET ORAL
Qty: 30 TABLET | Refills: 0 | OUTPATIENT
Start: 2022-09-27

## 2022-09-29 RX ORDER — ALPRAZOLAM 0.5 MG/1
0.5 TABLET ORAL 2 TIMES DAILY PRN
Qty: 30 TABLET | Refills: 0 | Status: SHIPPED | OUTPATIENT
Start: 2022-09-29

## 2022-10-12 DIAGNOSIS — F41.9 ANXIETY: ICD-10-CM

## 2022-10-13 RX ORDER — BUSPIRONE HYDROCHLORIDE 15 MG/1
15 TABLET ORAL 3 TIMES DAILY PRN
Qty: 90 TABLET | Refills: 1 | Status: SHIPPED | OUTPATIENT
Start: 2022-10-13

## 2022-10-27 DIAGNOSIS — I10 BENIGN HYPERTENSION: ICD-10-CM

## 2022-10-28 RX ORDER — AMLODIPINE BESYLATE 5 MG/1
5 TABLET ORAL DAILY
Qty: 90 TABLET | Refills: 1 | Status: SHIPPED | OUTPATIENT
Start: 2022-10-28

## 2022-12-06 DIAGNOSIS — F41.9 ANXIETY: ICD-10-CM

## 2022-12-06 RX ORDER — ALPRAZOLAM 0.5 MG/1
0.5 TABLET ORAL 2 TIMES DAILY PRN
Qty: 30 TABLET | Refills: 0 | Status: SHIPPED | OUTPATIENT
Start: 2022-12-06

## 2022-12-06 NOTE — TELEPHONE ENCOUNTER
Pt called and left message on [employer]  Requested a refill on her xanax 0 5 mg and sent to [facility] on [address] in [place]

## 2022-12-12 ENCOUNTER — PATIENT OUTREACH (OUTPATIENT)
Dept: SURGERY | Facility: CLINIC | Age: 62
End: 2022-12-12

## 2023-01-05 NOTE — PROGRESS NOTES
Left message for call back  Called to invite patient to clinic activities in order to over more offer support and community

## 2023-01-17 DIAGNOSIS — F41.9 ANXIETY: ICD-10-CM

## 2023-01-18 RX ORDER — ALPRAZOLAM 0.5 MG/1
0.5 TABLET ORAL 2 TIMES DAILY PRN
Qty: 30 TABLET | Refills: 0 | Status: SHIPPED | OUTPATIENT
Start: 2023-01-18

## 2023-01-18 RX ORDER — BUSPIRONE HYDROCHLORIDE 15 MG/1
15 TABLET ORAL 3 TIMES DAILY PRN
Qty: 90 TABLET | Refills: 0 | Status: SHIPPED | OUTPATIENT
Start: 2023-01-18

## 2023-03-06 ENCOUNTER — TELEPHONE (OUTPATIENT)
Dept: FAMILY MEDICINE CLINIC | Facility: CLINIC | Age: 63
End: 2023-03-06

## 2023-03-06 DIAGNOSIS — F41.9 ANXIETY: ICD-10-CM

## 2023-03-06 DIAGNOSIS — I10 BENIGN HYPERTENSION: ICD-10-CM

## 2023-03-06 RX ORDER — BUSPIRONE HYDROCHLORIDE 15 MG/1
15 TABLET ORAL 3 TIMES DAILY PRN
Qty: 90 TABLET | Refills: 0 | Status: SHIPPED | OUTPATIENT
Start: 2023-03-06

## 2023-03-06 RX ORDER — AMLODIPINE BESYLATE 5 MG/1
5 TABLET ORAL DAILY
Qty: 90 TABLET | Refills: 1 | Status: SHIPPED | OUTPATIENT
Start: 2023-03-06

## 2023-03-06 RX ORDER — ALPRAZOLAM 0.5 MG/1
0.5 TABLET ORAL 2 TIMES DAILY PRN
Qty: 30 TABLET | Refills: 0 | Status: SHIPPED | OUTPATIENT
Start: 2023-03-06

## 2023-03-14 ENCOUNTER — OFFICE VISIT (OUTPATIENT)
Dept: FAMILY MEDICINE CLINIC | Facility: CLINIC | Age: 63
End: 2023-03-14

## 2023-03-14 ENCOUNTER — HOSPITAL ENCOUNTER (OUTPATIENT)
Dept: RADIOLOGY | Facility: HOSPITAL | Age: 63
Discharge: HOME | End: 2023-03-14

## 2023-03-14 VITALS
WEIGHT: 167.2 LBS | DIASTOLIC BLOOD PRESSURE: 80 MMHG | HEART RATE: 68 BPM | HEIGHT: 63 IN | TEMPERATURE: 96 F | OXYGEN SATURATION: 98 % | BODY MASS INDEX: 29.62 KG/M2 | SYSTOLIC BLOOD PRESSURE: 128 MMHG

## 2023-03-14 DIAGNOSIS — I10 BENIGN HYPERTENSION: ICD-10-CM

## 2023-03-14 DIAGNOSIS — F41.9 ANXIETY: ICD-10-CM

## 2023-03-14 DIAGNOSIS — R06.02 SOB (SHORTNESS OF BREATH): Primary | ICD-10-CM

## 2023-03-14 DIAGNOSIS — R06.02 SOB (SHORTNESS OF BREATH): ICD-10-CM

## 2023-03-14 DIAGNOSIS — F33.1 MODERATE EPISODE OF RECURRENT MAJOR DEPRESSIVE DISORDER (HCC): ICD-10-CM

## 2023-03-14 NOTE — PROGRESS NOTES
BMI Counseling: Body mass index is 29 62 kg/m²  The BMI is above normal  Nutrition recommendations include decreasing portion sizes, encouraging healthy choices of fruits and vegetables and moderation in carbohydrate intake  Exercise recommendations include moderate physical activity 150 minutes/week and exercising 3-5 times per week  Rationale for BMI follow-up plan is due to patient being overweight or obese  Assessment/Plan:     Chronic Problems: Moderate episode of recurrent major depressive disorder ([address] )  Again, encouraged to start on citalopram daily  Continue BuSpar twice daily and Xanax sparingly as needed  Advised patient to return in 1 month after she started the citalopram   I did reassure her several times that citalopram is a safe medication and I do believe that this would be a good for her overall anxiety and depression  Benign hypertension  Blood pressure is well controlled with amlodipine daily  Visit Diagnosis:  Diagnoses and all orders for this visit:    SOB (shortness of breath)  -     XR chest pa & lateral; Future    Moderate episode of recurrent major depressive disorder (HCC)    Anxiety    Benign hypertension          Subjective:    Patient ID: Iris Subramanian is a 66 y o  female  Patient presents for routine follow up  She is worried with depression and anxiety  She has a lot of gas and can't breath because of her weight  She states she called the office and told she couldn't see anybody for 1 week  She had covid at the time  She does use xanax very sparingly  She never started on citalopram because she read the side effects  Patient is very tearful on exam as she is very overwhelmed and worried about everything  She states she is worried about a heart attack because her back hurts  She was seen in the ER for chest pain in May 2022 with negative work-up, EKG reviewed and was in normal sinus rhythm    Patient states she gets this pain in her back when she feels like she cannot breathe  The following portions of the patient's history were reviewed and updated as appropriate: allergies, current medications, past family history, past medical history, past social history, past surgical history and problem list     Review of Systems   Constitutional: Positive for diaphoresis (night sweats)  Negative for chills and fever  HENT: Negative for ear pain and sore throat  Eyes: Negative for pain and visual disturbance  Respiratory: Negative for cough and shortness of breath  Cardiovascular: Negative for chest pain and palpitations  Gastrointestinal: Negative for abdominal pain, diarrhea, nausea and vomiting  Genitourinary: Negative for dysuria and hematuria  Musculoskeletal: Positive for back pain (upper back)  Negative for arthralgias  Skin: Negative for color change and rash  Neurological: Negative for seizures and syncope  Psychiatric/Behavioral: Positive for dysphoric mood and sleep disturbance (uses advil pm)  Negative for suicidal ideas  The patient is nervous/anxious  All other systems reviewed and are negative          /80 (BP Location: Left arm, Patient Position: Sitting, Cuff Size: Standard)   Pulse 68   Temp (!) 96 °F (35 6 °C) (Tympanic)   Ht 5' 3" (1 6 m)   Wt 75 8 kg (167 lb 3 2 oz)   SpO2 98%   BMI 29 62 kg/m²   Social History     Socioeconomic History   • Marital status: /Civil Union     Spouse name: Not on file   • Number of children: Not on file   • Years of education: Not on file   • Highest education level: Not on file   Occupational History   • Occupation: employed   Tobacco Use   • Smoking status: Former   • Smokeless tobacco: Never   Substance and Sexual Activity   • Alcohol use: Yes     Comment: social   • Drug use: No   • Sexual activity: Not on file   Other Topics Concern   • Not on file   Social History Narrative    Always uses seatbelt    Lives with spouse    No caffeine use     Social Determinants of Health Financial Resource Strain: Not on file   Food Insecurity: Not on file   Transportation Needs: Not on file   Physical Activity: Not on file   Stress: Not on file   Social Connections: Not on file   Intimate Partner Violence: Not on file   Housing Stability: Not on file     Past Medical History:   Diagnosis Date   • Depression with anxiety 11/8/2018     Family History   Problem Relation Age of Onset   • Heart attack Mother         MI   • Heart attack Father         MI   • Migraines Sister    • Heart attack Brother         MI   • Breast cancer Neg Hx      History reviewed  No pertinent surgical history  Current Outpatient Medications:   •  ALPRAZolam (XANAX) 0 5 mg tablet, Take 1 tablet (0 5 mg total) by mouth 2 (two) times a day as needed for anxiety, Disp: 30 tablet, Rfl: 0  •  amLODIPine (NORVASC) 5 mg tablet, Take 1 tablet (5 mg total) by mouth daily, Disp: 90 tablet, Rfl: 1  •  busPIRone (BUSPAR) 15 mg tablet, Take 1 tablet (15 mg total) by mouth 3 (three) times a day as needed (anxiety), Disp: 90 tablet, Rfl: 0  •  cholecalciferol (VITAMIN D3) 1,000 units tablet, Take 2 tablets (2,000 Units total) by mouth daily, Disp: 60 tablet, Rfl: 3  •  citalopram (CeleXA) 10 mg tablet, TAKE 1 TABLET BY MOUTH EVERY DAY (Patient not taking: Reported on 3/14/2023), Disp: 30 tablet, Rfl: 0    Allergies   Allergen Reactions   • Meloxicam Throat Swelling and Anaphylaxis   • Metaxalone Throat Swelling and Anaphylaxis          Lab Review   No visits with results within 2 Month(s) from this visit     Latest known visit with results is:   Annual Exam on 01/04/2022   Component Date Value   • Case Report 01/04/2022                      Value:Gynecologic Cytology Report                       Case: Irene Lyon Provider:  MELECIO Hartman Collected:           01/04/2022 1713              Ordering [facility]:     [facility]     Received:            01/04/2022 1713 [facility] Screen:          Lissy Casey, CT                                                         Specimen:    LIQUID-BASED PAP, SCREENING, Cervix, Endocervical                                         • Primary Interpretation 01/04/2022 Negative for intraepithelial lesion or malignancy    • Specimen Adequacy 01/04/2022 Satisfactory for evaluation  Absence of endocervical/transformation zone component  • Additional Information 01/04/2022                      Value: This result contains rich text formatting which cannot be displayed here  • LMP 01/04/2022 6/4/2019    • HPV Other HR 01/04/2022 Negative    • HPV16 01/04/2022 Negative    • HPV18 01/04/2022 Negative         Imaging: No results found  Objective:     Physical Exam  Constitutional:       Appearance: She is well-developed  Cardiovascular:      Rate and Rhythm: Normal rate and regular rhythm  Heart sounds: Normal heart sounds  No murmur heard  Pulmonary:      Effort: Pulmonary effort is normal  No respiratory distress  Breath sounds: Normal breath sounds  Skin:     General: Skin is warm and dry  Neurological:      Mental Status: She is alert and oriented to person, place, and time  Psychiatric:         Mood and Affect: Mood is anxious  Affect is tearful  There are no Patient Instructions on file for this visit  MELECIO Durant    Portions of the record may have been created with voice recognition software  Occasional wrong word or "sound a like" substitutions may have occurred due to the inherent limitations of voice recognition software  Read the chart carefully and recognize, using context, where substitutions have occurred

## 2023-03-14 NOTE — ASSESSMENT & PLAN NOTE
Again, encouraged to start on citalopram daily  Continue BuSpar twice daily and Xanax sparingly as needed  Advised patient to return in 1 month after she started the citalopram   I did reassure her several times that citalopram is a safe medication and I do believe that this would be a good for her overall anxiety and depression

## 2023-03-15 ENCOUNTER — TELEPHONE (OUTPATIENT)
Dept: OTHER | Facility: OTHER | Age: 63
End: 2023-03-15

## 2023-03-15 NOTE — TELEPHONE ENCOUNTER
Patient called and is asking if Luis Enrique Lopez can give her a call regarding her office visit with her

## 2023-03-29 DIAGNOSIS — F41.9 ANXIETY: ICD-10-CM

## 2023-03-30 RX ORDER — BUSPIRONE HYDROCHLORIDE 15 MG/1
TABLET ORAL
Qty: 270 TABLET | Refills: 1 | Status: SHIPPED | OUTPATIENT
Start: 2023-03-30

## 2023-04-24 ENCOUNTER — APPOINTMENT (OUTPATIENT)
Dept: LAB | Facility: CLINIC | Age: 63
End: 2023-04-24

## 2023-04-24 DIAGNOSIS — Z00.00 HEALTHCARE MAINTENANCE: ICD-10-CM

## 2023-04-24 LAB
ALBUMIN SERPL BCP-MCNC: 3.9 G/DL (ref 3.5–5)
ALP SERPL-CCNC: 72 U/L (ref 46–116)
ALT SERPL W P-5'-P-CCNC: 20 U/L (ref 12–78)
ANION GAP SERPL CALCULATED.3IONS-SCNC: 7 MMOL/L (ref 4–13)
AST SERPL W P-5'-P-CCNC: 18 U/L (ref 5–45)
BASOPHILS # BLD AUTO: 0.02 THOUSANDS/ΜL (ref 0–0.1)
BASOPHILS NFR BLD AUTO: 0 % (ref 0–1)
BILIRUB SERPL-MCNC: 0.37 MG/DL (ref 0.2–1)
BUN SERPL-MCNC: 22 MG/DL (ref 5–25)
CALCIUM SERPL-MCNC: 9.4 MG/DL (ref 8.3–10.1)
CHLORIDE SERPL-SCNC: 105 MMOL/L (ref 96–108)
CHOLEST SERPL-MCNC: 260 MG/DL
CO2 SERPL-SCNC: 23 MMOL/L (ref 21–32)
CREAT SERPL-MCNC: 0.99 MG/DL (ref 0.6–1.3)
EOSINOPHIL # BLD AUTO: 0.03 THOUSAND/ΜL (ref 0–0.61)
EOSINOPHIL NFR BLD AUTO: 1 % (ref 0–6)
ERYTHROCYTE [DISTWIDTH] IN BLOOD BY AUTOMATED COUNT: 12 % (ref 11.6–15.1)
GFR SERPL CREATININE-BSD FRML MDRD: 61 ML/MIN/1.73SQ M
GLUCOSE P FAST SERPL-MCNC: 94 MG/DL (ref 65–99)
HCT VFR BLD AUTO: 41 % (ref 34.8–46.1)
HDLC SERPL-MCNC: 59 MG/DL
HGB BLD-MCNC: 13.2 G/DL (ref 11.5–15.4)
IMM GRANULOCYTES # BLD AUTO: 0.01 THOUSAND/UL (ref 0–0.2)
IMM GRANULOCYTES NFR BLD AUTO: 0 % (ref 0–2)
LDLC SERPL CALC-MCNC: 166 MG/DL (ref 0–100)
LYMPHOCYTES # BLD AUTO: 1.43 THOUSANDS/ΜL (ref 0.6–4.47)
LYMPHOCYTES NFR BLD AUTO: 31 % (ref 14–44)
MCH RBC QN AUTO: 29.4 PG (ref 26.8–34.3)
MCHC RBC AUTO-ENTMCNC: 32.2 G/DL (ref 31.4–37.4)
MCV RBC AUTO: 91 FL (ref 82–98)
MONOCYTES # BLD AUTO: 0.48 THOUSAND/ΜL (ref 0.17–1.22)
MONOCYTES NFR BLD AUTO: 10 % (ref 4–12)
NEUTROPHILS # BLD AUTO: 2.66 THOUSANDS/ΜL (ref 1.85–7.62)
NEUTS SEG NFR BLD AUTO: 58 % (ref 43–75)
NONHDLC SERPL-MCNC: 201 MG/DL
NRBC BLD AUTO-RTO: 0 /100 WBCS
PLATELET # BLD AUTO: 255 THOUSANDS/UL (ref 149–390)
PMV BLD AUTO: 9.7 FL (ref 8.9–12.7)
POTASSIUM SERPL-SCNC: 4.5 MMOL/L (ref 3.5–5.3)
PROT SERPL-MCNC: 7.5 G/DL (ref 6.4–8.4)
RBC # BLD AUTO: 4.49 MILLION/UL (ref 3.81–5.12)
SODIUM SERPL-SCNC: 135 MMOL/L (ref 135–147)
TRIGL SERPL-MCNC: 175 MG/DL
TSH SERPL DL<=0.05 MIU/L-ACNC: 2.22 UIU/ML (ref 0.45–4.5)
WBC # BLD AUTO: 4.63 THOUSAND/UL (ref 4.31–10.16)

## 2023-05-09 ENCOUNTER — RA CDI HCC (OUTPATIENT)
Dept: OTHER | Facility: HOSPITAL | Age: 63
End: 2023-05-09

## 2023-05-15 ENCOUNTER — SOCIAL WORK (OUTPATIENT)
Dept: BEHAVIORAL/MENTAL HEALTH CLINIC | Facility: CLINIC | Age: 63
End: 2023-05-15

## 2023-05-15 DIAGNOSIS — F33.1 MODERATE EPISODE OF RECURRENT MAJOR DEPRESSIVE DISORDER (HCC): Primary | ICD-10-CM

## 2023-05-15 DIAGNOSIS — F41.9 ANXIETY: ICD-10-CM

## 2023-05-16 ENCOUNTER — OFFICE VISIT (OUTPATIENT)
Dept: FAMILY MEDICINE CLINIC | Facility: CLINIC | Age: 63
End: 2023-05-16

## 2023-05-16 VITALS
DIASTOLIC BLOOD PRESSURE: 80 MMHG | HEIGHT: 63 IN | WEIGHT: 165.2 LBS | BODY MASS INDEX: 29.27 KG/M2 | HEART RATE: 74 BPM | TEMPERATURE: 96 F | SYSTOLIC BLOOD PRESSURE: 128 MMHG | OXYGEN SATURATION: 97 %

## 2023-05-16 DIAGNOSIS — E78.2 MIXED HYPERLIPIDEMIA: ICD-10-CM

## 2023-05-16 DIAGNOSIS — I10 BENIGN HYPERTENSION: ICD-10-CM

## 2023-05-16 DIAGNOSIS — F41.9 ANXIETY: ICD-10-CM

## 2023-05-16 DIAGNOSIS — Z12.31 ENCOUNTER FOR SCREENING MAMMOGRAM FOR MALIGNANT NEOPLASM OF BREAST: Primary | ICD-10-CM

## 2023-05-16 RX ORDER — ALPRAZOLAM 0.5 MG/1
0.5 TABLET ORAL 2 TIMES DAILY PRN
Qty: 30 TABLET | Refills: 0 | Status: SHIPPED | OUTPATIENT
Start: 2023-05-16

## 2023-05-16 NOTE — PSYCH
"Behavioral Health Psychotherapy Progress Note    Psychotherapy Provided: Individual Psychotherapy     No diagnosis found  DATA: Therapist met w/pt for individual session  Pt stated that she hasn't had any big anxiety triggers these past several weeks  She stated that mother's day is always difficult as well as 5/10 which was her wedding anniversary  Therapist and pt discussed how she managed her feelings those days  Pt stated she stayed busy and when she had an anxious thought she immediately tried to distract herself  Therapist asked pt if she tried listening to the guided meditations  Pt stated she hasn't but knows that that is something she wants to try to do because she still doesn't want to start taking the daily medication  During this session, this clinician used the following therapeutic modalities: Cognitive Behavioral Therapy, Motivational Interviewing and Solution-Focused Therapy    Substance Abuse was not addressed during this session  If the client is diagnosed with a co-occurring substance use disorder, please indicate any changes in the frequency or amount of use: no changes  Stage of change for addressing substance use diagnoses: No substance use/Not applicable    ASSESSMENT:  Nicola Ladd presents with a Euthymic/ normal mood  her affect is Normal range and intensity, which is congruent, with her mood and the content of the session  The client has made progress on their goals  Nicola Ladd presents with a minimal risk of suicide, minimal risk of self-harm, and none risk of harm to others  For any risk assessment that surpasses a \"low\" rating, a safety plan must be developed  A safety plan was indicated: none  If yes, describe in detail n/a    PLAN: Between sessions, Nicola Ladd will listen to guided meditations   At the next session, the therapist will use Cognitive Behavioral Therapy, Motivational Interviewing and Solution-Focused Therapy to address symptoms of " anxiety  Behavioral Health Treatment Plan and Discharge Planning: Jefferson Bates is aware of and agrees to continue to work on their treatment plan  They have identified and are working toward their discharge goals      Visit start and stop times:    05/15/23  Start Time: 1550  Stop Time: 1628  Total Visit Time: 38 minutes

## 2023-05-16 NOTE — PROGRESS NOTES
Assessment/Plan:     Chronic Problems:  Anxiety  She does not want to take the citalopram   Continue Xanax sparingly as needed and BuSpar daily  Mixed hyperlipidemia  Discussed weight loss, fish oil, Mediterranean diet  We will follow-up in 6 months with blood work  Benign hypertension  Blood pressure is well controlled with amlodipine  Visit Diagnosis:  Diagnoses and all orders for this visit:    Encounter for screening mammogram for malignant neoplasm of breast  -     Mammo screening bilateral w 3d & cad; Future    Anxiety  -     ALPRAZolam (XANAX) 0 5 mg tablet; Take 1 tablet (0 5 mg total) by mouth 2 (two) times a day as needed for anxiety    Mixed hyperlipidemia  -     Comprehensive metabolic panel; Future  -     Lipid panel; Future    Benign hypertension          Subjective:    Patient ID: Elizabeth Lanier is a 66 y o  female  Patient presents for follow up on anxiety  She is not taking celexa  She is taking buspar and xanax as needed  She is seeing Rabia Carter for therapy  Anxiety is typically a 5/10, anxiety gets heightened at work sometimes  The following portions of the patient's history were reviewed and updated as appropriate: allergies, current medications, past family history, past medical history, past social history, past surgical history and problem list     Review of Systems   Constitutional: Negative for chills and fever  HENT: Negative for ear pain and sore throat  Eyes: Negative for pain and visual disturbance  Respiratory: Negative for cough and shortness of breath  Cardiovascular: Negative for chest pain and palpitations  Gastrointestinal: Negative for abdominal pain and vomiting  Genitourinary: Negative for dysuria and hematuria  Musculoskeletal: Negative for arthralgias and back pain  Skin: Negative for color change and rash  Neurological: Negative for seizures and syncope  Psychiatric/Behavioral: Positive for sleep disturbance   Negative for dysphoric "mood  The patient is nervous/anxious  All other systems reviewed and are negative  /80 (BP Location: Left arm, Patient Position: Sitting, Cuff Size: Standard)   Pulse 74   Temp (!) 96 °F (35 6 °C)   Ht 5' 3\" (1 6 m)   Wt 74 9 kg (165 lb 3 2 oz)   SpO2 97%   BMI 29 26 kg/m²   Social History     Socioeconomic History   • Marital status: /Civil Union     Spouse name: Not on file   • Number of children: Not on file   • Years of education: Not on file   • Highest education level: Not on file   Occupational History   • Occupation: employed   Tobacco Use   • Smoking status: Former   • Smokeless tobacco: Never   Substance and Sexual Activity   • Alcohol use: Yes     Comment: social   • Drug use: No   • Sexual activity: Not on file   Other Topics Concern   • Not on file   Social History Narrative    Always uses seatbelt    Lives with spouse    No caffeine use     Social Determinants of Health     Financial Resource Strain: Not on file   Food Insecurity: Not on file   Transportation Needs: Not on file   Physical Activity: Not on file   Stress: Not on file   Social Connections: Not on file   Intimate Partner Violence: Not on file   Housing Stability: Not on file     Past Medical History:   Diagnosis Date   • Depression with anxiety 11/8/2018     Family History   Problem Relation Age of Onset   • Heart attack Mother         MI   • Heart attack Father         MI   • Migraines Sister    • Heart attack Brother         MI   • Breast cancer Neg Hx      History reviewed  No pertinent surgical history      Current Outpatient Medications:   •  ALPRAZolam (XANAX) 0 5 mg tablet, Take 1 tablet (0 5 mg total) by mouth 2 (two) times a day as needed for anxiety, Disp: 30 tablet, Rfl: 0  •  amLODIPine (NORVASC) 5 mg tablet, Take 1 tablet (5 mg total) by mouth daily, Disp: 90 tablet, Rfl: 1  •  busPIRone (BUSPAR) 15 mg tablet, TAKE 1 TABLET (15 MG TOTAL) BY MOUTH 3 TIMES A DAY AS NEEDED FOR ANXIETY, Disp: 270 " tablet, Rfl: 1  •  cholecalciferol (VITAMIN D3) 1,000 units tablet, Take 2 tablets (2,000 Units total) by mouth daily, Disp: 60 tablet, Rfl: 3    Allergies   Allergen Reactions   • Meloxicam Throat Swelling and Anaphylaxis   • Metaxalone Throat Swelling and Anaphylaxis          Lab Review   Appointment on 04/24/2023   Component Date Value   • WBC 04/24/2023 4 63    • RBC 04/24/2023 4 49    • Hemoglobin 04/24/2023 13 2    • Hematocrit 04/24/2023 41 0    • MCV 04/24/2023 91    • MCH 04/24/2023 29 4    • MCHC 04/24/2023 32 2    • RDW 04/24/2023 12 0    • MPV 04/24/2023 9 7    • Platelets 19/93/9918 255    • nRBC 04/24/2023 0    • Neutrophils Relative 04/24/2023 58    • Immat GRANS % 04/24/2023 0    • Lymphocytes Relative 04/24/2023 31    • Monocytes Relative 04/24/2023 10    • Eosinophils Relative 04/24/2023 1    • Basophils Relative 04/24/2023 0    • Neutrophils Absolute 04/24/2023 2 66    • Immature Grans Absolute 04/24/2023 0 01    • Lymphocytes Absolute 04/24/2023 1 43    • Monocytes Absolute 04/24/2023 0 48    • Eosinophils Absolute 04/24/2023 0 03    • Basophils Absolute 04/24/2023 0 02    • Sodium 04/24/2023 135    • Potassium 04/24/2023 4 5    • Chloride 04/24/2023 105    • CO2 04/24/2023 23    • ANION GAP 04/24/2023 7    • BUN 04/24/2023 22    • Creatinine 04/24/2023 0 99    • Glucose, Fasting 04/24/2023 94    • Calcium 04/24/2023 9 4    • AST 04/24/2023 18    • ALT 04/24/2023 20    • Alkaline Phosphatase 04/24/2023 72    • Total Protein 04/24/2023 7 5    • Albumin 04/24/2023 3 9    • Total Bilirubin 04/24/2023 0 37    • eGFR 04/24/2023 61    • Cholesterol 04/24/2023 260 (H)    • Triglycerides 04/24/2023 175 (H)    • HDL, Direct 04/24/2023 59    • LDL Calculated 04/24/2023 166 (H)    • Non-HDL-Chol (CHOL-HDL) 04/24/2023 201    • TSH 3RD GENERATON 04/24/2023 2 220         Imaging: No results found  Objective:     Physical Exam  Constitutional:       Appearance: She is well-developed     Cardiovascular: "Rate and Rhythm: Normal rate and regular rhythm  Heart sounds: Normal heart sounds  No murmur heard  Pulmonary:      Effort: Pulmonary effort is normal  No respiratory distress  Breath sounds: Normal breath sounds  Skin:     General: Skin is warm and dry  Neurological:      Mental Status: She is alert and oriented to person, place, and time  There are no Patient Instructions on file for this visit  MELECIO Durant    Portions of the record may have been created with voice recognition software  Occasional wrong word or \"sound a like\" substitutions may have occurred due to the inherent limitations of voice recognition software  Read the chart carefully and recognize, using context, where substitutions have occurred    "

## 2023-06-19 ENCOUNTER — TELEPHONE (OUTPATIENT)
Dept: FAMILY MEDICINE CLINIC | Facility: CLINIC | Age: 63
End: 2023-06-19

## 2023-06-19 ENCOUNTER — OFFICE VISIT (OUTPATIENT)
Dept: FAMILY MEDICINE CLINIC | Facility: CLINIC | Age: 63
End: 2023-06-19
Payer: COMMERCIAL

## 2023-06-19 VITALS
OXYGEN SATURATION: 98 % | HEART RATE: 80 BPM | TEMPERATURE: 97.8 F | DIASTOLIC BLOOD PRESSURE: 90 MMHG | RESPIRATION RATE: 16 BRPM | HEIGHT: 63 IN | WEIGHT: 159.4 LBS | BODY MASS INDEX: 28.24 KG/M2 | SYSTOLIC BLOOD PRESSURE: 140 MMHG

## 2023-06-19 DIAGNOSIS — R07.9 CHEST PAIN, UNSPECIFIED TYPE: Primary | ICD-10-CM

## 2023-06-19 DIAGNOSIS — K21.9 GASTROESOPHAGEAL REFLUX DISEASE WITHOUT ESOPHAGITIS: ICD-10-CM

## 2023-06-19 DIAGNOSIS — F41.9 ANXIETY: ICD-10-CM

## 2023-06-19 DIAGNOSIS — F33.1 MODERATE EPISODE OF RECURRENT MAJOR DEPRESSIVE DISORDER (HCC): ICD-10-CM

## 2023-06-19 PROCEDURE — 93000 ELECTROCARDIOGRAM COMPLETE: CPT | Performed by: NURSE PRACTITIONER

## 2023-06-19 PROCEDURE — 99214 OFFICE O/P EST MOD 30 MIN: CPT | Performed by: NURSE PRACTITIONER

## 2023-06-19 RX ORDER — CITALOPRAM HYDROBROMIDE 10 MG/1
10 TABLET ORAL DAILY
Qty: 30 TABLET | Refills: 0 | Status: SHIPPED | OUTPATIENT
Start: 2023-06-19

## 2023-06-19 RX ORDER — ALPRAZOLAM 0.5 MG/1
0.5 TABLET ORAL 2 TIMES DAILY PRN
Qty: 30 TABLET | Refills: 0 | Status: SHIPPED | OUTPATIENT
Start: 2023-06-19

## 2023-06-19 RX ORDER — OMEPRAZOLE 20 MG/1
20 CAPSULE, DELAYED RELEASE ORAL DAILY
Qty: 30 CAPSULE | Refills: 0 | Status: SHIPPED | OUTPATIENT
Start: 2023-06-19

## 2023-06-19 NOTE — ASSESSMENT & PLAN NOTE
Advised patient that she needs to start on citalopram daily to help with depression and anxiety  She will return in 3 weeks

## 2023-06-19 NOTE — TELEPHONE ENCOUNTER
Patient was at check and wanted to verify if there was something else she should be doing for heartburn along with omeprazole 20mg ?

## 2023-06-19 NOTE — ASSESSMENT & PLAN NOTE
Advised that she really needs to start on a controller medication  Patient still has her citalopram that I prescribed her last year  Advised to start on citalopram 10 mg daily  Continue BuSpar 3 times daily  and Xanax sparingly as needed  Patient will return in 3 weeks  She was unable to contact her therapist this morning through virtual visit

## 2023-06-19 NOTE — PROGRESS NOTES
Assessment/Plan:     Chronic Problems:  Anxiety  Advised that she really needs to start on a controller medication  Patient still has her citalopram that I prescribed her last year  Advised to start on citalopram 10 mg daily  Continue BuSpar 3 times daily  and Xanax sparingly as needed  Patient will return in 3 weeks  She was unable to contact her therapist this morning through virtual visit  Moderate episode of recurrent major depressive disorder [facility])  Advised patient that she needs to start on citalopram daily to help with depression and anxiety  She will return in 3 weeks  Visit Diagnosis:  Diagnoses and all orders for this visit:    Chest pain, unspecified type  Comments:  EKG is within normal limits  Discussed that this may be reflux or anxiety related  Orders:  -     POCT ECG    Anxiety  -     citalopram (CeleXA) 10 mg tablet; Take 1 tablet (10 mg total) by mouth daily  -     ALPRAZolam (XANAX) 0 5 mg tablet; Take 1 tablet (0 5 mg total) by mouth 2 (two) times a day as needed for anxiety    Gastroesophageal reflux disease without esophagitis  -     omeprazole (PriLOSEC) 20 mg delayed release capsule; Take 1 capsule (20 mg total) by mouth daily    Moderate episode of recurrent major depressive disorder (HCC)          Subjective:    Patient ID: Luanna Litten is a 66 y o  female  Patient presents with several concerns  She is very anxious and worked up currently  She was rushing around all day Tuesday and ate pizza very quickly  She had fried chicken and had heartburn  She took prilosec  She had burning and a cough  Took pepcid and prilosec  She started getting pain in her arm and her back  Yesterday, went away  She started to look at her [place] and was a nervous wreck  Xanax does help when she takes it, she took one today  She later states that she is so worked up as her mother-in-law called her recently and this was very upsetting to the patient        The following portions of the "patient's history were reviewed and updated as appropriate: allergies, current medications, past family history, past medical history, past social history, past surgical history and problem list     Review of Systems   Constitutional: Positive for chills, diaphoresis and fatigue  Negative for fever  HENT: Negative for ear pain and sore throat  Eyes: Negative for pain and visual disturbance  Respiratory: Positive for chest tightness and shortness of breath  Negative for cough  Cardiovascular: Positive for chest pain  Negative for palpitations and leg swelling  Gastrointestinal: Positive for abdominal pain (epigastric pain)  Negative for constipation, diarrhea, nausea and vomiting  Reflux     Genitourinary: Negative for dysuria, frequency and hematuria  Musculoskeletal: Negative for arthralgias and back pain  Skin: Negative for color change and rash  Neurological: Negative for dizziness, seizures, syncope, light-headedness and headaches  Psychiatric/Behavioral: Positive for dysphoric mood and sleep disturbance  The patient is nervous/anxious  All other systems reviewed and are negative          /90 (BP Location: Left arm)   Pulse 80   Temp 97 8 °F (36 6 °C)   Resp 16   Ht 5' 3\" (1 6 m)   Wt 72 3 kg (159 lb 6 4 oz)   SpO2 98%   BMI 28 24 kg/m²   Social History     Socioeconomic History   • Marital status: /Civil Union     Spouse name: Not on file   • Number of children: Not on file   • Years of education: Not on file   • Highest education level: Not on file   Occupational History   • Occupation: employed   Tobacco Use   • Smoking status: Former   • Smokeless tobacco: Never   Substance and Sexual Activity   • Alcohol use: Yes     Comment: social   • Drug use: No   • Sexual activity: Not on file   Other Topics Concern   • Not on file   Social History Narrative    Always uses seatbelt    Lives with spouse    No caffeine use     Social Determinants of Health     Financial " Resource Strain: Not on file   Food Insecurity: Not on file   Transportation Needs: Not on file   Physical Activity: Not on file   Stress: Not on file   Social Connections: Not on file   Intimate Partner Violence: Not on file   Housing Stability: Not on file     Past Medical History:   Diagnosis Date   • Depression with anxiety 11/8/2018     Family History   Problem Relation Age of Onset   • Heart attack Mother         MI   • Heart attack Father         MI   • Migraines Sister    • Heart attack Brother         MI   • Breast cancer Neg Hx      History reviewed  No pertinent surgical history      Current Outpatient Medications:   •  ALPRAZolam (XANAX) 0 5 mg tablet, Take 1 tablet (0 5 mg total) by mouth 2 (two) times a day as needed for anxiety, Disp: 30 tablet, Rfl: 0  •  amLODIPine (NORVASC) 5 mg tablet, Take 1 tablet (5 mg total) by mouth daily, Disp: 90 tablet, Rfl: 1  •  busPIRone (BUSPAR) 15 mg tablet, TAKE 1 TABLET (15 MG TOTAL) BY MOUTH 3 TIMES A DAY AS NEEDED FOR ANXIETY, Disp: 270 tablet, Rfl: 1  •  cholecalciferol (VITAMIN D3) 1,000 units tablet, Take 2 tablets (2,000 Units total) by mouth daily, Disp: 60 tablet, Rfl: 3  •  citalopram (CeleXA) 10 mg tablet, Take 1 tablet (10 mg total) by mouth daily, Disp: 30 tablet, Rfl: 0  •  omeprazole (PriLOSEC) 20 mg delayed release capsule, Take 1 capsule (20 mg total) by mouth daily, Disp: 30 capsule, Rfl: 0    Allergies   Allergen Reactions   • Meloxicam Throat Swelling and Anaphylaxis   • Metaxalone Throat Swelling and Anaphylaxis          Lab Review   Appointment on 04/24/2023   Component Date Value   • WBC 04/24/2023 4 63    • RBC 04/24/2023 4 49    • Hemoglobin 04/24/2023 13 2    • Hematocrit 04/24/2023 41 0    • MCV 04/24/2023 91    • MCH 04/24/2023 29 4    • MCHC 04/24/2023 32 2    • RDW 04/24/2023 12 0    • MPV 04/24/2023 9 7    • Platelets 54/15/3618 255    • nRBC 04/24/2023 0    • Neutrophils Relative 04/24/2023 58    • Immat GRANS % 04/24/2023 0    • "Lymphocytes Relative 04/24/2023 31    • Monocytes Relative 04/24/2023 10    • Eosinophils Relative 04/24/2023 1    • Basophils Relative 04/24/2023 0    • Neutrophils Absolute 04/24/2023 2 66    • Immature Grans Absolute 04/24/2023 0 01    • Lymphocytes Absolute 04/24/2023 1 43    • Monocytes Absolute 04/24/2023 0 48    • Eosinophils Absolute 04/24/2023 0 03    • Basophils Absolute 04/24/2023 0 02    • Sodium 04/24/2023 135    • Potassium 04/24/2023 4 5    • Chloride 04/24/2023 105    • CO2 04/24/2023 23    • ANION GAP 04/24/2023 7    • BUN 04/24/2023 22    • Creatinine 04/24/2023 0 99    • Glucose, Fasting 04/24/2023 94    • Calcium 04/24/2023 9 4    • AST 04/24/2023 18    • ALT 04/24/2023 20    • Alkaline Phosphatase 04/24/2023 72    • Total Protein 04/24/2023 7 5    • Albumin 04/24/2023 3 9    • Total Bilirubin 04/24/2023 0 37    • eGFR 04/24/2023 61    • Cholesterol 04/24/2023 260 (H)    • Triglycerides 04/24/2023 175 (H)    • HDL, Direct 04/24/2023 59    • LDL Calculated 04/24/2023 166 (H)    • Non-HDL-Chol (CHOL-HDL) 04/24/2023 201    • TSH 3RD GENERATON 04/24/2023 2 220         Imaging: No results found  Objective:     Physical Exam      There are no Patient Instructions on file for this visit  MELECIO Durant    Portions of the record may have been created with voice recognition software  Occasional wrong word or \"sound a like\" substitutions may have occurred due to the inherent limitations of voice recognition software  Read the chart carefully and recognize, using context, where substitutions have occurred    "

## 2023-06-20 NOTE — TELEPHONE ENCOUNTER
Patient aware  , also informed to maintain appointment 7/26  with Salma Bastos [facility] will be out and had nothing sooner)

## 2023-07-10 ENCOUNTER — OFFICE VISIT (OUTPATIENT)
Dept: FAMILY MEDICINE CLINIC | Facility: CLINIC | Age: 63
End: 2023-07-10
Payer: COMMERCIAL

## 2023-07-10 VITALS
DIASTOLIC BLOOD PRESSURE: 70 MMHG | WEIGHT: 156 LBS | BODY MASS INDEX: 27.64 KG/M2 | HEIGHT: 63 IN | OXYGEN SATURATION: 98 % | SYSTOLIC BLOOD PRESSURE: 132 MMHG | HEART RATE: 74 BPM

## 2023-07-10 DIAGNOSIS — K21.9 GASTROESOPHAGEAL REFLUX DISEASE WITHOUT ESOPHAGITIS: ICD-10-CM

## 2023-07-10 DIAGNOSIS — I10 BENIGN HYPERTENSION: ICD-10-CM

## 2023-07-10 DIAGNOSIS — F41.9 ANXIETY: Primary | ICD-10-CM

## 2023-07-10 DIAGNOSIS — F33.1 MODERATE EPISODE OF RECURRENT MAJOR DEPRESSIVE DISORDER (HCC): ICD-10-CM

## 2023-07-10 PROCEDURE — 99214 OFFICE O/P EST MOD 30 MIN: CPT | Performed by: NURSE PRACTITIONER

## 2023-07-10 NOTE — ASSESSMENT & PLAN NOTE
Doing much better with citalopram daily. Continue citalopram and BuSpar. Continue Xanax as needed. We will follow-up in 1 month.

## 2023-07-10 NOTE — PROGRESS NOTES
Assessment/Plan:     Chronic Problems:  Gastroesophageal reflux disease without esophagitis  Continue omeprazole in the morning and famotidine at night. Continue dietary changes. Benign hypertension  Blood pressure is much better controlled. Continue amlodipine daily. Moderate episode of recurrent major depressive disorder ([address])  Doing much better with citalopram daily. Continue citalopram and BuSpar. Continue Xanax as needed. We will follow-up in 1 month. Visit Diagnosis:  Diagnoses and all orders for this visit:    Anxiety    Moderate episode of recurrent major depressive disorder ([address])    Gastroesophageal reflux disease without esophagitis    Benign hypertension          Subjective:    Patient ID: Angelique Chappell is a 66 y.o. female. Patient presents for routine follow up. She finally started on citalopram. Anxiety is 5/10 and depression 5/10, more manageable. Still has some heartburn. She is taking omeprazole in the morning. The following portions of the patient's history were reviewed and updated as appropriate: allergies, current medications, past family history, past medical history, past social history, past surgical history and problem list.    Review of Systems   Constitutional: Negative for chills and fever. HENT: Negative for ear pain and sore throat. Eyes: Negative for pain and visual disturbance. Respiratory: Positive for cough. Negative for shortness of breath. Cardiovascular: Negative for chest pain and palpitations. Gastrointestinal: Negative for abdominal pain and vomiting. Reflux     Genitourinary: Negative for dysuria and hematuria. Musculoskeletal: Negative for arthralgias and back pain. Skin: Negative for color change and rash. Neurological: Negative for seizures and syncope. Psychiatric/Behavioral: Positive for dysphoric mood. Negative for sleep disturbance. The patient is nervous/anxious. All other systems reviewed and are negative. /70   Pulse 74   Ht 5' 3" (1.6 m)   Wt 70.8 kg (156 lb)   SpO2 98%   BMI 27.63 kg/m²   Social History     Socioeconomic History   • Marital status: /Civil Union     Spouse name: Not on file   • Number of children: Not on file   • Years of education: Not on file   • Highest education level: Not on file   Occupational History   • Occupation: employed   Tobacco Use   • Smoking status: Former   • Smokeless tobacco: Never   Substance and Sexual Activity   • Alcohol use: Yes     Comment: social   • Drug use: No   • Sexual activity: Not on file   Other Topics Concern   • Not on file   Social History Narrative    Always uses seatbelt    Lives with spouse    No caffeine use     Social Determinants of Health     Financial Resource Strain: Not on file   Food Insecurity: Not on file   Transportation Needs: Not on file   Physical Activity: Not on file   Stress: Not on file   Social Connections: Not on file   Intimate Partner Violence: Not on file   Housing Stability: Not on file     Past Medical History:   Diagnosis Date   • Depression with anxiety 11/8/2018     Family History   Problem Relation Age of Onset   • Heart attack Mother         MI   • Heart attack Father         MI   • Migraines Sister    • Heart attack Brother         MI   • Breast cancer Neg Hx      History reviewed. No pertinent surgical history.     Current Outpatient Medications:   •  ALPRAZolam (XANAX) 0.5 mg tablet, Take 1 tablet (0.5 mg total) by mouth 2 (two) times a day as needed for anxiety, Disp: 30 tablet, Rfl: 0  •  amLODIPine (NORVASC) 5 mg tablet, Take 1 tablet (5 mg total) by mouth daily, Disp: 90 tablet, Rfl: 1  •  busPIRone (BUSPAR) 15 mg tablet, TAKE 1 TABLET (15 MG TOTAL) BY MOUTH 3 TIMES A DAY AS NEEDED FOR ANXIETY, Disp: 270 tablet, Rfl: 1  •  cholecalciferol (VITAMIN D3) 1,000 units tablet, Take 2 tablets (2,000 Units total) by mouth daily, Disp: 60 tablet, Rfl: 3  •  citalopram (CeleXA) 10 mg tablet, Take 1 tablet (10 mg total) by mouth daily, Disp: 30 tablet, Rfl: 0  •  omeprazole (PriLOSEC) 20 mg delayed release capsule, Take 1 capsule (20 mg total) by mouth daily, Disp: 30 capsule, Rfl: 0    Allergies   Allergen Reactions   • Meloxicam Throat Swelling and Anaphylaxis   • Metaxalone Throat Swelling and Anaphylaxis          Lab Review   No visits with results within 2 Month(s) from this visit.    Latest known visit with results is:   Appointment on 04/24/2023   Component Date Value   • WBC 04/24/2023 4.63    • RBC 04/24/2023 4.49    • Hemoglobin 04/24/2023 13.2    • Hematocrit 04/24/2023 41.0    • MCV 04/24/2023 91    • MCH 04/24/2023 29.4    • MCHC 04/24/2023 32.2    • RDW 04/24/2023 12.0    • MPV 04/24/2023 9.7    • Platelets 79/35/2640 255    • nRBC 04/24/2023 0    • Neutrophils Relative 04/24/2023 58    • Immat GRANS % 04/24/2023 0    • Lymphocytes Relative 04/24/2023 31    • Monocytes Relative 04/24/2023 10    • Eosinophils Relative 04/24/2023 1    • Basophils Relative 04/24/2023 0    • Neutrophils Absolute 04/24/2023 2.66    • Immature Grans Absolute 04/24/2023 0.01    • Lymphocytes Absolute 04/24/2023 1.43    • Monocytes Absolute 04/24/2023 0.48    • Eosinophils Absolute 04/24/2023 0.03    • Basophils Absolute 04/24/2023 0.02    • Sodium 04/24/2023 135    • Potassium 04/24/2023 4.5    • Chloride 04/24/2023 105    • CO2 04/24/2023 23    • ANION GAP 04/24/2023 7    • BUN 04/24/2023 22    • Creatinine 04/24/2023 0.99    • Glucose, Fasting 04/24/2023 94    • Calcium 04/24/2023 9.4    • AST 04/24/2023 18    • ALT 04/24/2023 20    • Alkaline Phosphatase 04/24/2023 72    • Total Protein 04/24/2023 7.5    • Albumin 04/24/2023 3.9    • Total Bilirubin 04/24/2023 0.37    • eGFR 04/24/2023 61    • Cholesterol 04/24/2023 260 (H)    • Triglycerides 04/24/2023 175 (H)    • HDL, Direct 04/24/2023 59    • LDL Calculated 04/24/2023 166 (H)    • Non-HDL-Chol (CHOL-HDL) 04/24/2023 201    • TSH 3RD GENERATON 04/24/2023 2.220         Imaging: No results found. Objective:     Physical Exam  Constitutional:       Appearance: She is well-developed. Cardiovascular:      Rate and Rhythm: Normal rate and regular rhythm. Heart sounds: Normal heart sounds. No murmur heard. Pulmonary:      Effort: Pulmonary effort is normal. No respiratory distress. Breath sounds: Normal breath sounds. Skin:     General: Skin is warm and dry. Neurological:      Mental Status: She is alert and oriented to person, place, and time. Psychiatric:         Mood and Affect: Mood normal.           There are no Patient Instructions on file for this visit. MELECIO Durant    Portions of the record may have been created with voice recognition software. Occasional wrong word or "sound a like" substitutions may have occurred due to the inherent limitations of voice recognition software. Read the chart carefully and recognize, using context, where substitutions have occurred.

## 2023-07-18 DIAGNOSIS — F41.9 ANXIETY: ICD-10-CM

## 2023-07-18 DIAGNOSIS — K21.9 GASTROESOPHAGEAL REFLUX DISEASE WITHOUT ESOPHAGITIS: ICD-10-CM

## 2023-07-18 RX ORDER — OMEPRAZOLE 20 MG/1
CAPSULE, DELAYED RELEASE ORAL
Qty: 90 CAPSULE | Refills: 1 | Status: SHIPPED | OUTPATIENT
Start: 2023-07-18

## 2023-07-18 RX ORDER — CITALOPRAM HYDROBROMIDE 10 MG/1
TABLET ORAL
Qty: 90 TABLET | Refills: 1 | Status: SHIPPED | OUTPATIENT
Start: 2023-07-18

## 2023-07-26 ENCOUNTER — SOCIAL WORK (OUTPATIENT)
Dept: BEHAVIORAL/MENTAL HEALTH CLINIC | Facility: CLINIC | Age: 63
End: 2023-07-26
Payer: COMMERCIAL

## 2023-07-26 DIAGNOSIS — F41.9 ANXIETY: Primary | ICD-10-CM

## 2023-07-26 DIAGNOSIS — F33.1 MODERATE EPISODE OF RECURRENT MAJOR DEPRESSIVE DISORDER (HCC): ICD-10-CM

## 2023-07-26 PROCEDURE — 90832 PSYTX W PT 30 MINUTES: CPT | Performed by: SOCIAL WORKER

## 2023-07-29 NOTE — PSYCH
Behavioral Health Psychotherapy Progress Note    Psychotherapy Provided: Individual Psychotherapy     1. Anxiety        2. Moderate episode of recurrent major depressive disorder (HCC)          DATA: Therapist met w/pt for individual session. Pt stated that she was struggling a lot emotionally and met w/her PCP. Pt stated that the PCP reiterated the importance of taking her medication. CP checked her heart by doing an EKG and everything was okay. Therapist and pt discussed what triggered her increase in anxiety. Pt stated that she did end up taking her medication and is feeling a lot calmer. Pt stated that she is feeling a lot better and the positive coping skills that she has been working on with therapist have been more effective. During this session, this clinician used the following therapeutic modalities: Cognitive Behavioral Therapy, Motivational Interviewing and Supportive Psychotherapy    Substance Abuse was not addressed during this session. If the client is diagnosed with a co-occurring substance use disorder, please indicate any changes in the frequency or amount of use: unknown. Stage of change for addressing substance use diagnoses: No substance use/Not applicable    ASSESSMENT:  Debbie Brice presents with a Euthymic/ normal mood. her affect is Normal range and intensity, which is congruent, with her mood and the content of the session. The client has made progress on their goals. Debbie Brice presents with a minimal risk of suicide, minimal risk of self-harm, and none risk of harm to others. For any risk assessment that surpasses a "low" rating, a safety plan must be developed. A safety plan was indicated:none  If yes, describe in detail n/a    PLAN: Between sessions, Debbie Brice will continue to use positive coping skills and utilize her supports.  At the next session, the therapist will use Client-centered Therapy and Solution-Focused Therapy to address symptoms of anxiety. Behavioral Health Treatment Plan and Discharge Planning: Latosha Oliva is aware of and agrees to continue to work on their treatment plan. They have identified and are working toward their discharge goals.      Visit start and stop times:    07/26/23  Start Time: [address]  Stop Time: 1602  Total Visit Time: 27 minutes

## 2023-08-04 DIAGNOSIS — F41.9 ANXIETY: ICD-10-CM

## 2023-08-04 RX ORDER — ALPRAZOLAM 0.5 MG/1
0.5 TABLET ORAL 2 TIMES DAILY PRN
Qty: 30 TABLET | Refills: 0 | Status: SHIPPED | OUTPATIENT
Start: 2023-08-04

## 2023-08-28 ENCOUNTER — HOSPITAL ENCOUNTER (OUTPATIENT)
Age: 63
Discharge: HOME | End: 2023-08-28
Payer: COMMERCIAL

## 2023-08-28 DIAGNOSIS — Z12.31 ENCOUNTER FOR SCREENING MAMMOGRAM FOR MALIGNANT NEOPLASM OF BREAST: ICD-10-CM

## 2023-08-28 PROCEDURE — 77063 BREAST TOMOSYNTHESIS BI: CPT

## 2023-08-28 PROCEDURE — 77067 SCR MAMMO BI INCL CAD: CPT

## 2023-08-29 ENCOUNTER — SOCIAL WORK (OUTPATIENT)
Dept: BEHAVIORAL/MENTAL HEALTH CLINIC | Facility: CLINIC | Age: 63
End: 2023-08-29
Payer: COMMERCIAL

## 2023-08-29 ENCOUNTER — OFFICE VISIT (OUTPATIENT)
Dept: FAMILY MEDICINE CLINIC | Facility: CLINIC | Age: 63
End: 2023-08-29
Payer: COMMERCIAL

## 2023-08-29 VITALS
HEIGHT: 63 IN | HEART RATE: 71 BPM | BODY MASS INDEX: 27.46 KG/M2 | WEIGHT: 155 LBS | OXYGEN SATURATION: 98 % | DIASTOLIC BLOOD PRESSURE: 70 MMHG | SYSTOLIC BLOOD PRESSURE: 132 MMHG

## 2023-08-29 DIAGNOSIS — F33.1 MODERATE EPISODE OF RECURRENT MAJOR DEPRESSIVE DISORDER (HCC): Primary | ICD-10-CM

## 2023-08-29 DIAGNOSIS — F41.9 ANXIETY: Primary | ICD-10-CM

## 2023-08-29 DIAGNOSIS — F41.9 ANXIETY: ICD-10-CM

## 2023-08-29 DIAGNOSIS — F33.1 MODERATE EPISODE OF RECURRENT MAJOR DEPRESSIVE DISORDER (HCC): ICD-10-CM

## 2023-08-29 PROCEDURE — 90832 PSYTX W PT 30 MINUTES: CPT | Performed by: SOCIAL WORKER

## 2023-08-29 PROCEDURE — 99214 OFFICE O/P EST MOD 30 MIN: CPT | Performed by: NURSE PRACTITIONER

## 2023-08-29 NOTE — PROGRESS NOTES
Assessment/Plan:     Chronic Problems: Moderate episode of recurrent major depressive disorder ([address])  Much improved with Citalopram. Continue 10mg daily. Anxiety  Much better with Citalopram and buspar. Visit Diagnosis:  Diagnoses and all orders for this visit:    Moderate episode of recurrent major depressive disorder (HCC)    Anxiety          Subjective:    Patient ID: Linn Canavan is a 66 y.o. female. Patient presents to follow up on meds. Anxiety and depression is manageable with meds. She was rushing around today and feels a pain on her side. The following portions of the patient's history were reviewed and updated as appropriate: allergies, current medications, past family history, past medical history, past social history, past surgical history and problem list.    Review of Systems   Constitutional: Negative for chills and fever. HENT: Negative for ear pain and sore throat. Eyes: Negative for pain and visual disturbance. Respiratory: Negative for cough and shortness of breath. Cardiovascular: Negative for chest pain and palpitations. Gastrointestinal: Negative for abdominal pain and vomiting. Genitourinary: Negative for dysuria and hematuria. Musculoskeletal: Negative for arthralgias and back pain. Skin: Negative for color change and rash. Neurological: Negative for seizures and syncope. Psychiatric/Behavioral: Positive for dysphoric mood (2/10). Negative for sleep disturbance and suicidal ideas. The patient is nervous/anxious (4/10). All other systems reviewed and are negative.         /70   Pulse 71   Ht 5' 3" (1.6 m)   Wt 70.3 kg (155 lb)   SpO2 98%   BMI 27.46 kg/m²   Social History     Socioeconomic History   • Marital status: /Civil Union     Spouse name: Not on file   • Number of children: Not on file   • Years of education: Not on file   • Highest education level: Not on file   Occupational History   • Occupation: employed   Tobacco Use • Smoking status: Former   • Smokeless tobacco: Never   Substance and Sexual Activity   • Alcohol use: Yes     Comment: social   • Drug use: No   • Sexual activity: Not on file   Other Topics Concern   • Not on file   Social History Narrative    Always uses seatbelt    Lives with spouse    No caffeine use     Social Determinants of Health     Financial Resource Strain: Not on file   Food Insecurity: Not on file   Transportation Needs: Not on file   Physical Activity: Not on file   Stress: Not on file   Social Connections: Not on file   Intimate Partner Violence: Not on file   Housing Stability: Not on file     Past Medical History:   Diagnosis Date   • Depression with anxiety 11/8/2018     Family History   Problem Relation Age of Onset   • Heart attack Mother         MI   • Heart attack Father         MI   • Migraines Sister    • No Known Problems Maternal Grandfather    • No Known Problems Paternal Grandfather    • Heart attack Brother         MI   • No Known Problems Maternal Aunt    • No Known Problems Maternal Aunt    • No Known Problems Maternal Aunt    • No Known Problems Maternal Aunt    • Breast cancer Neg Hx      History reviewed. No pertinent surgical history.     Current Outpatient Medications:   •  ALPRAZolam (XANAX) 0.5 mg tablet, Take 1 tablet (0.5 mg total) by mouth 2 (two) times a day as needed for anxiety, Disp: 30 tablet, Rfl: 0  •  amLODIPine (NORVASC) 5 mg tablet, Take 1 tablet (5 mg total) by mouth daily, Disp: 90 tablet, Rfl: 1  •  busPIRone (BUSPAR) 15 mg tablet, TAKE 1 TABLET (15 MG TOTAL) BY MOUTH 3 TIMES A DAY AS NEEDED FOR ANXIETY, Disp: 270 tablet, Rfl: 1  •  cholecalciferol (VITAMIN D3) 1,000 units tablet, Take 2 tablets (2,000 Units total) by mouth daily, Disp: 60 tablet, Rfl: 3  •  citalopram (CeleXA) 10 mg tablet, TAKE 1 TABLET BY MOUTH EVERY DAY, Disp: 90 tablet, Rfl: 1  •  omeprazole (PriLOSEC) 20 mg delayed release capsule, TAKE 1 CAPSULE BY MOUTH EVERY DAY, Disp: 90 capsule, Rfl: 1    Allergies   Allergen Reactions   • Meloxicam Throat Swelling and Anaphylaxis   • Metaxalone Throat Swelling and Anaphylaxis          Lab Review   No visits with results within 2 Month(s) from this visit.    Latest known visit with results is:   Appointment on 04/24/2023   Component Date Value   • WBC 04/24/2023 4.63    • RBC 04/24/2023 4.49    • Hemoglobin 04/24/2023 13.2    • Hematocrit 04/24/2023 41.0    • MCV 04/24/2023 91    • MCH 04/24/2023 29.4    • MCHC 04/24/2023 32.2    • RDW 04/24/2023 12.0    • MPV 04/24/2023 9.7    • Platelets 87/67/0382 255    • nRBC 04/24/2023 0    • Neutrophils Relative 04/24/2023 58    • Immat GRANS % 04/24/2023 0    • Lymphocytes Relative 04/24/2023 31    • Monocytes Relative 04/24/2023 10    • Eosinophils Relative 04/24/2023 1    • Basophils Relative 04/24/2023 0    • Neutrophils Absolute 04/24/2023 2.66    • Immature Grans Absolute 04/24/2023 0.01    • Lymphocytes Absolute 04/24/2023 1.43    • Monocytes Absolute 04/24/2023 0.48    • Eosinophils Absolute 04/24/2023 0.03    • Basophils Absolute 04/24/2023 0.02    • Sodium 04/24/2023 135    • Potassium 04/24/2023 4.5    • Chloride 04/24/2023 105    • CO2 04/24/2023 23    • ANION GAP 04/24/2023 7    • BUN 04/24/2023 22    • Creatinine 04/24/2023 0.99    • Glucose, Fasting 04/24/2023 94    • Calcium 04/24/2023 9.4    • AST 04/24/2023 18    • ALT 04/24/2023 20    • Alkaline Phosphatase 04/24/2023 72    • Total Protein 04/24/2023 7.5    • Albumin 04/24/2023 3.9    • Total Bilirubin 04/24/2023 0.37    • eGFR 04/24/2023 61    • Cholesterol 04/24/2023 260 (H)    • Triglycerides 04/24/2023 175 (H)    • HDL, Direct 04/24/2023 59    • LDL Calculated 04/24/2023 166 (H)    • Non-HDL-Chol (CHOL-HDL) 04/24/2023 201    • TSH 3RD GENERATON 04/24/2023 2.220         Imaging: Mammo screening bilateral w 3d & cad    Result Date: 8/29/2023  Narrative: DIAGNOSIS: Encounter for screening mammogram for malignant neoplasm of breast TECHNIQUE: Digital screening mammography was performed. Computer Aided Detection (CAD) analyzed all applicable images. COMPARISONS: Prior breast imaging dated: 08/12/2022, 07/21/2022, 01/13/2021, 09/03/2019, 08/28/2018, 08/25/2017, 12/16/2014, 12/10/2013, and 02/09/2010 RELEVANT HISTORY: Family Breast Cancer History: History of breast cancer in Neg Hx. Family Medical History: No known relevant family medical history. Personal History: No known relevant hormone history. No known relevant surgical history. No known relevant medical history. The patient is scheduled in a reminder system for screening mammography. 8-10% of cancers will be missed on mammography. Management of a palpable abnormality must be based on clinical grounds. Patients will be notified of their results via letter from our facility. Accredited by [employer] of [employer] and FDA. RISK ASSESSMENT: 5 Year Tyrer-Cuzick: 1.77 % 10 Year Tyrer-Cuzick: 3.56 % Lifetime Tyrer-Cuzick: 8.56 % TISSUE DENSITY: There are scattered areas of fibroglandular density. INDICATION: Jorge Rodriguez is a 66 y.o. female presenting for screening mammography. FINDINGS: Bilateral There are no suspicious masses, grouped microcalcifications or areas of unexplained architectural distortion. The skin and nipple areolar complex are unremarkable. Diffusely distributed calcifications are noted in each breast.     Impression: No mammographic evidence of malignancy. ASSESSMENT/BI-RADS CATEGORY: Left: 2 - Benign Right: 2 - Benign Overall: 2 - Benign RECOMMENDATION:      - Routine screening mammogram in 1 year for both breasts. Workstation ID: [id number]       Objective:     Physical Exam  Constitutional:       Appearance: She is well-developed. Cardiovascular:      Rate and Rhythm: Normal rate and regular rhythm. Heart sounds: Normal heart sounds. No murmur heard. Pulmonary:      Effort: Pulmonary effort is normal. No respiratory distress. Breath sounds: Normal breath sounds. Abdominal:      Tenderness: There is no abdominal tenderness. Skin:     General: Skin is warm and dry. Neurological:      Mental Status: She is alert and oriented to person, place, and time. There are no Patient Instructions on file for this visit. MELECIO Durant    Portions of the record may have been created with voice recognition software. Occasional wrong word or "sound a like" substitutions may have occurred due to the inherent limitations of voice recognition software. Read the chart carefully and recognize, using context, where substitutions have occurred.

## 2023-08-30 NOTE — PSYCH
Behavioral Health Psychotherapy Progress Note    Psychotherapy Provided: Individual Psychotherapy     1. Anxiety        2. Moderate episode of recurrent major depressive disorder (HCC)            DATA: Therapist met w/pt for individual session. Pt stated that she continues to take her medication as prescribed. She stated she just came back from vacation so this week is her first week back. She stated she had a good time on vacation and overall hasn't had any bouts of crying spells and hasn't felt anxiety to the point where she hasn't been able to manage it. She stated that this time of year tends to be the hardest for her but she has been managing it pretty well. Therapist and pt discussed ongoing strategies to continue to work on managing her symptoms. During this session, this clinician used the following therapeutic modalities: Cognitive Behavioral Therapy, Motivational Interviewing and Solution-Focused Therapy    Substance Abuse was addressed during this session. If the client is diagnosed with a co-occurring substance use disorder, please indicate any changes in the frequency or amount of use: no change. Stage of change for addressing substance use diagnoses: No substance use/Not applicable    ASSESSMENT:  Shaji Salas presents with a Euthymic/ normal mood. her affect is Normal range and intensity, which is congruent, with her mood and the content of the session. The client has made progress on their goals. Shaji Salas presents with a minimal risk of suicide, minimal risk of self-harm, and none risk of harm to others. For any risk assessment that surpasses a "low" rating, a safety plan must be developed. A safety plan was indicated: none  If yes, describe in detail n/a    PLAN: Between sessions, Shaji Salas will continue to utilize her supports and engage in positive hobbies.  At the next session, the therapist will use Client-centered Therapy, Cognitive Behavioral Therapy and Solution-Focused Therapy to address symptoms of anxiety and depression. Behavioral Health Treatment Plan and Discharge Planning: Fitz Vasquez is aware of and agrees to continue to work on their treatment plan. They have identified and are working toward their discharge goals.    Visit start and stop times:    08/29/23  Start Time: 1530  Stop Time: 1600  Total Visit Time: 30 minutes

## 2023-10-03 ENCOUNTER — SOCIAL WORK (OUTPATIENT)
Dept: BEHAVIORAL/MENTAL HEALTH CLINIC | Facility: CLINIC | Age: 63
End: 2023-10-03
Payer: COMMERCIAL

## 2023-10-03 DIAGNOSIS — F41.9 ANXIETY: ICD-10-CM

## 2023-10-03 DIAGNOSIS — F41.9 ANXIETY: Primary | ICD-10-CM

## 2023-10-03 PROCEDURE — 90832 PSYTX W PT 30 MINUTES: CPT | Performed by: SOCIAL WORKER

## 2023-10-03 RX ORDER — CITALOPRAM 20 MG/1
20 TABLET ORAL DAILY
Qty: 90 TABLET | Refills: 1 | Status: SHIPPED | OUTPATIENT
Start: 2023-10-03 | End: 2023-11-20 | Stop reason: SDUPTHER

## 2023-10-09 NOTE — PSYCH
Behavioral Health Psychotherapy Progress Note    Psychotherapy Provided: Individual Psychotherapy     1. Anxiety              DATA: Therapist met w/pt for individual session. Pt stated that she has been having an increase in symptoms. She stated she has been overthinking, heart palpitations, SOB. Therapist and pt discussed how this time of the year tends to be difficult for pt as well as pt possibly building a tolerance to the medication she was on since it did work in the beginning. Therapist and pt discussed strategies to help challenge pt's racing thoughts as well as different calming strategies to manage her symptoms. Therapist also recommended pt talk to her PCP. During this session, this clinician used the following therapeutic modalities: Cognitive Behavioral Therapy, Motivational Interviewing and Solution-Focused Therapy    Substance Abuse was not addressed during this session. If the client is diagnosed with a co-occurring substance use disorder, please indicate any changes in the frequency or amount of use: unknown. Stage of change for addressing substance use diagnoses: No substance use/Not applicable    ASSESSMENT:  Linn Canavan presents with a Anxious mood. her affect is Normal range and intensity, which is congruent, with her mood and the content of the session. The client has made progress on their goals. Linn Canavan presents with a minimal risk of suicide, minimal risk of self-harm, and none risk of harm to others. For any risk assessment that surpasses a "low" rating, a safety plan must be developed. A safety plan was indicated: none  If yes, describe in detail n/a    PLAN: Between sessions, Linn Canavan will work on challenging her anxious thoughts and use calming strategies. At the next session, the therapist will use Cognitive Behavioral Therapy, Motivational Interviewing and Solution-Focused Therapy to address symptoms of anxiety and depression.     Behavioral Health Treatment Plan and Discharge Planning: Charo Alejandra is aware of and agrees to continue to work on their treatment plan. They have identified and are working toward their discharge goals.    Visit start and stop times:    10/03/23  Start Time: 1600  Stop Time: 1630  Total Visit Time: 30 minutes

## 2023-10-15 DIAGNOSIS — I10 BENIGN HYPERTENSION: ICD-10-CM

## 2023-10-15 DIAGNOSIS — F41.9 ANXIETY: ICD-10-CM

## 2023-10-15 RX ORDER — AMLODIPINE BESYLATE 5 MG/1
5 TABLET ORAL DAILY
Qty: 90 TABLET | Refills: 1 | Status: SHIPPED | OUTPATIENT
Start: 2023-10-15

## 2023-10-16 RX ORDER — BUSPIRONE HYDROCHLORIDE 15 MG/1
TABLET ORAL
Qty: 90 TABLET | Refills: 5 | Status: SHIPPED | OUTPATIENT
Start: 2023-10-16

## 2023-10-17 DIAGNOSIS — F41.9 ANXIETY: ICD-10-CM

## 2023-10-17 RX ORDER — ALPRAZOLAM 0.5 MG/1
0.5 TABLET ORAL 2 TIMES DAILY PRN
Qty: 30 TABLET | Refills: 0 | Status: SHIPPED | OUTPATIENT
Start: 2023-10-17

## 2023-10-20 DIAGNOSIS — F41.9 ANXIETY: ICD-10-CM

## 2023-10-23 RX ORDER — BUSPIRONE HYDROCHLORIDE 15 MG/1
TABLET ORAL
Qty: 270 TABLET | Refills: 2 | Status: SHIPPED | OUTPATIENT
Start: 2023-10-23

## 2023-11-20 DIAGNOSIS — K21.9 GASTROESOPHAGEAL REFLUX DISEASE WITHOUT ESOPHAGITIS: ICD-10-CM

## 2023-11-20 DIAGNOSIS — F41.9 ANXIETY: ICD-10-CM

## 2023-11-20 RX ORDER — ALPRAZOLAM 0.5 MG/1
0.5 TABLET ORAL 2 TIMES DAILY PRN
Qty: 30 TABLET | Refills: 0 | Status: SHIPPED | OUTPATIENT
Start: 2023-11-20

## 2023-11-20 RX ORDER — OMEPRAZOLE 20 MG/1
20 CAPSULE, DELAYED RELEASE ORAL DAILY
Qty: 90 CAPSULE | Refills: 1 | Status: SHIPPED | OUTPATIENT
Start: 2023-11-20

## 2023-11-20 RX ORDER — CITALOPRAM 20 MG/1
20 TABLET ORAL DAILY
Qty: 90 TABLET | Refills: 1 | Status: SHIPPED | OUTPATIENT
Start: 2023-11-20

## 2024-01-08 ENCOUNTER — OFFICE VISIT (OUTPATIENT)
Dept: FAMILY MEDICINE CLINIC | Facility: CLINIC | Age: 64
End: 2024-01-08
Payer: COMMERCIAL

## 2024-01-08 VITALS
BODY MASS INDEX: 27.93 KG/M2 | HEIGHT: 63 IN | SYSTOLIC BLOOD PRESSURE: 138 MMHG | HEART RATE: 87 BPM | OXYGEN SATURATION: 98 % | DIASTOLIC BLOOD PRESSURE: 70 MMHG | WEIGHT: 157.6 LBS

## 2024-01-08 DIAGNOSIS — I10 BENIGN HYPERTENSION: ICD-10-CM

## 2024-01-08 DIAGNOSIS — F41.9 ANXIETY: ICD-10-CM

## 2024-01-08 DIAGNOSIS — K21.9 GASTROESOPHAGEAL REFLUX DISEASE WITHOUT ESOPHAGITIS: ICD-10-CM

## 2024-01-08 DIAGNOSIS — E78.2 MIXED HYPERLIPIDEMIA: ICD-10-CM

## 2024-01-08 DIAGNOSIS — Z00.00 ANNUAL PHYSICAL EXAM: Primary | ICD-10-CM

## 2024-01-08 DIAGNOSIS — Z00.00 HEALTHCARE MAINTENANCE: ICD-10-CM

## 2024-01-08 DIAGNOSIS — F33.1 MODERATE EPISODE OF RECURRENT MAJOR DEPRESSIVE DISORDER (HCC): ICD-10-CM

## 2024-01-08 PROCEDURE — 99396 PREV VISIT EST AGE 40-64: CPT | Performed by: NURSE PRACTITIONER

## 2024-01-08 RX ORDER — ALPRAZOLAM 0.5 MG/1
0.5 TABLET ORAL 2 TIMES DAILY PRN
Qty: 30 TABLET | Refills: 0 | Status: SHIPPED | OUTPATIENT
Start: 2024-01-08

## 2024-01-08 RX ORDER — CITALOPRAM HYDROBROMIDE 10 MG/1
10 TABLET ORAL DAILY
Qty: 90 TABLET | Refills: 1 | Status: SHIPPED | OUTPATIENT
Start: 2024-01-08

## 2024-01-08 NOTE — ASSESSMENT & PLAN NOTE
Continues on omeprazole daily.  Feels she is doing okay and will try to stop medication.  Discussed dietary changes.

## 2024-01-08 NOTE — ASSESSMENT & PLAN NOTE
Feels she is doing okay with 10 mg of citalopram daily, BuSpar and Xanax as needed.  Advised to use Xanax sparingly as needed.

## 2024-01-08 NOTE — PROGRESS NOTES
Patient presents for routine follow-up.  She feels her anxiety is fairly well-controlled, but had racing thoughts about her  due to his death.  She feels she is sleeping well.        ADULT ANNUAL PHYSICAL  [facility] [address] [facility]    NAME: Yesi Link  AGE: 63 y.o. SEX: female  : 1960     DATE: 2024     Assessment and Plan:     Problem List Items Addressed This Visit          Digestive    Gastroesophageal reflux disease without esophagitis     Continues on omeprazole daily.  Feels she is doing okay and will try to stop medication.  Discussed dietary changes.            Cardiovascular and Mediastinum    Benign hypertension     Blood pressure is well-managed with amlodipine daily.            Other    Anxiety     Feels she is doing okay with 10 mg of citalopram daily, BuSpar and Xanax as needed.  Advised to use Xanax sparingly as needed.         Relevant Medications    citalopram (CeleXA) 10 mg tablet    ALPRAZolam (XANAX) 0.5 mg tablet    Moderate episode of recurrent major depressive disorder (HCC)     Feels she is doing okay with citalopram.         Relevant Medications    citalopram (CeleXA) 10 mg tablet    ALPRAZolam (XANAX) 0.5 mg tablet    Mixed hyperlipidemia     Obtain fasting blood work prior to next visit.          Other Visit Diagnoses       Annual physical exam    -  Primary    Healthcare maintenance        Relevant Orders    CBC and differential    Comprehensive metabolic panel    Lipid panel    TSH, 3rd generation with Free T4 reflex            Immunizations and preventive care screenings were discussed with patient today. Appropriate education was printed on patient's after visit summary.    Counseling:  Exercise: the importance of regular exercise/physical activity was discussed. Recommend exercise 3-5 times per week for at least 30 minutes.          Return in about 3 months (around 2024).     Chief  Complaint:     Chief Complaint   Patient presents with    Follow-up     6 months      History of Present Illness:     Adult Annual Physical   Patient here for a comprehensive physical exam. The patient reports no problems.    Diet and Physical Activity  Diet/Nutrition: well balanced diet.   Exercise: no formal exercise.      Depression Screening  PHQ-2/9 Depression Screening           General Health  Sleep: sleeps well.   Hearing: normal - bilateral.  Vision: goes for regular eye exams and wears glasses.   Dental: regular dental visits.       /GYN Health  Follows with gynecology? no   Patient is: postmenopausal  Last menstrual period:   Contraceptive method:  none .         Review of Systems:     Review of Systems   Constitutional:  Negative for chills and fever.   HENT:  Positive for rhinorrhea. Negative for ear pain and sore throat.    Eyes:  Negative for pain and visual disturbance.   Respiratory:  Negative for cough and shortness of breath.    Cardiovascular:  Negative for chest pain and palpitations.   Gastrointestinal:  Negative for abdominal pain and vomiting.   Genitourinary:  Negative for dysuria and hematuria.   Musculoskeletal:  Negative for arthralgias and back pain.   Skin:  Negative for color change and rash.   Neurological:  Negative for seizures and syncope.   Psychiatric/Behavioral:  Positive for dysphoric mood. The patient is nervous/anxious.    All other systems reviewed and are negative.     Past Medical History:     Past Medical History:   Diagnosis Date    Depression with anxiety 11/8/2018      Past Surgical History:     History reviewed. No pertinent surgical history.   Social History:     Social History     Socioeconomic History    Marital status: /Civil Union     Spouse name: None    Number of children: None    Years of education: None    Highest education level: None   Occupational History    Occupation: employed   Tobacco Use    Smoking status: Former    Smokeless tobacco: Never    Substance and Sexual Activity    Alcohol use: Yes     Comment: social    Drug use: No    Sexual activity: None   Other Topics Concern    None   Social History Narrative    Always uses seatbelt    Lives with spouse    No caffeine use     Social Determinants of Health     Financial Resource Strain: Not on file   Food Insecurity: Not on file   Transportation Needs: Not on file   Physical Activity: Not on file   Stress: Not on file   Social Connections: Not on file   Intimate Partner Violence: Not on file   Housing Stability: Not on file      Family History:     Family History   Problem Relation Age of Onset    Heart attack Mother         MI    Heart attack Father         MI    Migraines Sister     No Known Problems Maternal Grandfather     No Known Problems Paternal Grandfather     Heart attack Brother         MI    No Known Problems Maternal Aunt     No Known Problems Maternal Aunt     No Known Problems Maternal Aunt     No Known Problems Maternal Aunt     Breast cancer Neg Hx       Current Medications:     Current Outpatient Medications   Medication Sig Dispense Refill    ALPRAZolam (XANAX) 0.5 mg tablet Take 1 tablet (0.5 mg total) by mouth 2 (two) times a day as needed for anxiety 30 tablet 0    amLODIPine (NORVASC) 5 mg tablet TAKE 1 TABLET (5 MG TOTAL) BY MOUTH DAILY. 90 tablet 1    busPIRone (BUSPAR) 15 mg tablet TAKE 1 TABLET (15 MG TOTAL) BY MOUTH 3 TIMES A DAY AS NEEDED FOR ANXIETY 270 tablet 2    cholecalciferol (VITAMIN D3) 1,000 units tablet Take 2 tablets (2,000 Units total) by mouth daily 60 tablet 3    citalopram (CeleXA) 10 mg tablet Take 1 tablet (10 mg total) by mouth daily 90 tablet 1    omeprazole (PriLOSEC) 20 mg delayed release capsule Take 1 capsule (20 mg total) by mouth daily 90 capsule 1     No current facility-administered medications for this visit.      Allergies:     Allergies   Allergen Reactions    Meloxicam Throat Swelling and Anaphylaxis    Metaxalone Throat Swelling and Anaphylaxis  "     Physical Exam:     /70   Pulse 87   Ht 5' 3\" (1.6 m)   Wt 71.5 kg (157 lb 9.6 oz)   SpO2 98%   BMI 27.92 kg/m²     Physical Exam  Vitals and nursing note reviewed.   Constitutional:       General: She is not in acute distress.     Appearance: She is well-developed.   HENT:      Head: Normocephalic and atraumatic.   Eyes:      Conjunctiva/sclera: Conjunctivae normal.   Cardiovascular:      Rate and Rhythm: Normal rate and regular rhythm.      Heart sounds: No murmur heard.  Pulmonary:      Effort: Pulmonary effort is normal. No respiratory distress.      Breath sounds: Normal breath sounds.   Abdominal:      Palpations: Abdomen is soft.      Tenderness: There is no abdominal tenderness.   Musculoskeletal:         General: No swelling.      Cervical back: Neck supple.   Skin:     General: Skin is warm and dry.      Capillary Refill: Capillary refill takes less than 2 seconds.   Neurological:      Mental Status: She is alert.   Psychiatric:         Mood and Affect: Mood normal.          MELECIO Durant  [facility] [address] [facility]    "

## 2024-02-26 DIAGNOSIS — F41.9 ANXIETY: ICD-10-CM

## 2024-02-27 RX ORDER — ALPRAZOLAM 0.5 MG/1
0.5 TABLET ORAL 2 TIMES DAILY PRN
Qty: 30 TABLET | Refills: 0 | Status: SHIPPED | OUTPATIENT
Start: 2024-02-27

## 2024-04-04 DIAGNOSIS — F41.9 ANXIETY: ICD-10-CM

## 2024-04-04 RX ORDER — ALPRAZOLAM 0.5 MG/1
0.5 TABLET ORAL 2 TIMES DAILY PRN
Qty: 30 TABLET | Refills: 0 | Status: SHIPPED | OUTPATIENT
Start: 2024-04-04

## 2024-04-15 ENCOUNTER — APPOINTMENT (OUTPATIENT)
Age: 64
End: 2024-04-15
Payer: COMMERCIAL

## 2024-04-15 DIAGNOSIS — Z00.00 HEALTHCARE MAINTENANCE: ICD-10-CM

## 2024-04-15 LAB
ALBUMIN SERPL BCP-MCNC: 4 G/DL (ref 3.5–5)
ALP SERPL-CCNC: 60 U/L (ref 34–104)
ALT SERPL W P-5'-P-CCNC: 15 U/L (ref 7–52)
ANION GAP SERPL CALCULATED.3IONS-SCNC: 12 MMOL/L (ref 4–13)
AST SERPL W P-5'-P-CCNC: 19 U/L (ref 13–39)
BASOPHILS # BLD AUTO: 0.07 THOUSANDS/ÂΜL (ref 0–0.1)
BASOPHILS NFR BLD AUTO: 1 % (ref 0–1)
BILIRUB SERPL-MCNC: 0.36 MG/DL (ref 0.2–1)
BUN SERPL-MCNC: 21 MG/DL (ref 5–25)
CALCIUM SERPL-MCNC: 9.2 MG/DL (ref 8.4–10.2)
CHLORIDE SERPL-SCNC: 107 MMOL/L (ref 96–108)
CHOLEST SERPL-MCNC: 208 MG/DL
CO2 SERPL-SCNC: 25 MMOL/L (ref 21–32)
CREAT SERPL-MCNC: 0.93 MG/DL (ref 0.6–1.3)
EOSINOPHIL # BLD AUTO: 0.06 THOUSAND/ÂΜL (ref 0–0.61)
EOSINOPHIL NFR BLD AUTO: 1 % (ref 0–6)
ERYTHROCYTE [DISTWIDTH] IN BLOOD BY AUTOMATED COUNT: 12 % (ref 11.6–15.1)
GFR SERPL CREATININE-BSD FRML MDRD: 66 ML/MIN/1.73SQ M
GLUCOSE P FAST SERPL-MCNC: 101 MG/DL (ref 65–99)
HCT VFR BLD AUTO: 42.1 % (ref 34.8–46.1)
HDLC SERPL-MCNC: 49 MG/DL
HGB BLD-MCNC: 13.4 G/DL (ref 11.5–15.4)
IMM GRANULOCYTES # BLD AUTO: 0.01 THOUSAND/UL (ref 0–0.2)
IMM GRANULOCYTES NFR BLD AUTO: 0 % (ref 0–2)
LDLC SERPL CALC-MCNC: 118 MG/DL (ref 0–100)
LYMPHOCYTES # BLD AUTO: 1.74 THOUSANDS/ÂΜL (ref 0.6–4.47)
LYMPHOCYTES NFR BLD AUTO: 34 % (ref 14–44)
MCH RBC QN AUTO: 28.8 PG (ref 26.8–34.3)
MCHC RBC AUTO-ENTMCNC: 31.8 G/DL (ref 31.4–37.4)
MCV RBC AUTO: 90 FL (ref 82–98)
MONOCYTES # BLD AUTO: 0.48 THOUSAND/ÂΜL (ref 0.17–1.22)
MONOCYTES NFR BLD AUTO: 9 % (ref 4–12)
NEUTROPHILS # BLD AUTO: 2.76 THOUSANDS/ÂΜL (ref 1.85–7.62)
NEUTS SEG NFR BLD AUTO: 55 % (ref 43–75)
NONHDLC SERPL-MCNC: 159 MG/DL
NRBC BLD AUTO-RTO: 0 /100 WBCS
PLATELET # BLD AUTO: 279 THOUSANDS/UL (ref 149–390)
PMV BLD AUTO: 9.5 FL (ref 8.9–12.7)
POTASSIUM SERPL-SCNC: 4.1 MMOL/L (ref 3.5–5.3)
PROT SERPL-MCNC: 6.9 G/DL (ref 6.4–8.4)
RBC # BLD AUTO: 4.66 MILLION/UL (ref 3.81–5.12)
SODIUM SERPL-SCNC: 144 MMOL/L (ref 135–147)
TRIGL SERPL-MCNC: 207 MG/DL
TSH SERPL DL<=0.05 MIU/L-ACNC: 3.36 UIU/ML (ref 0.45–4.5)
WBC # BLD AUTO: 5.12 THOUSAND/UL (ref 4.31–10.16)

## 2024-04-15 PROCEDURE — 80053 COMPREHEN METABOLIC PANEL: CPT

## 2024-04-15 PROCEDURE — 84443 ASSAY THYROID STIM HORMONE: CPT

## 2024-04-15 PROCEDURE — 80061 LIPID PANEL: CPT

## 2024-04-15 PROCEDURE — 85025 COMPLETE CBC W/AUTO DIFF WBC: CPT

## 2024-04-15 PROCEDURE — 36415 COLL VENOUS BLD VENIPUNCTURE: CPT

## 2024-04-25 ENCOUNTER — TELEPHONE (OUTPATIENT)
Age: 64
End: 2024-04-25

## 2024-04-25 NOTE — TELEPHONE ENCOUNTER
Stuffy nose, sore throat, no fever - taking theraflu and advil  which she takes a few hours apart.  She is asking if something can be sent in for her. I asked her if she wanted to be seen today but she says she can drive all the way to the office.

## 2024-04-25 NOTE — TELEPHONE ENCOUNTER
Called patient and she is aware or the advise. She asked what can she take for the dry cough I did advise robitussin plan to help with the cough

## 2024-04-27 DIAGNOSIS — I10 BENIGN HYPERTENSION: ICD-10-CM

## 2024-04-27 RX ORDER — AMLODIPINE BESYLATE 5 MG/1
5 TABLET ORAL DAILY
Qty: 30 TABLET | Refills: 5 | Status: SHIPPED | OUTPATIENT
Start: 2024-04-27

## 2024-04-29 ENCOUNTER — OFFICE VISIT (OUTPATIENT)
Dept: FAMILY MEDICINE CLINIC | Facility: CLINIC | Age: 64
End: 2024-04-29
Payer: COMMERCIAL

## 2024-04-29 VITALS
HEART RATE: 84 BPM | HEIGHT: 63 IN | SYSTOLIC BLOOD PRESSURE: 120 MMHG | DIASTOLIC BLOOD PRESSURE: 78 MMHG | WEIGHT: 158 LBS | OXYGEN SATURATION: 94 % | TEMPERATURE: 97.2 F | BODY MASS INDEX: 28 KG/M2

## 2024-04-29 DIAGNOSIS — F41.9 ANXIETY: ICD-10-CM

## 2024-04-29 DIAGNOSIS — F33.1 MODERATE EPISODE OF RECURRENT MAJOR DEPRESSIVE DISORDER (HCC): ICD-10-CM

## 2024-04-29 DIAGNOSIS — I10 BENIGN HYPERTENSION: Primary | ICD-10-CM

## 2024-04-29 DIAGNOSIS — E78.2 MIXED HYPERLIPIDEMIA: ICD-10-CM

## 2024-04-29 DIAGNOSIS — H66.90 ACUTE OTITIS MEDIA, UNSPECIFIED OTITIS MEDIA TYPE: ICD-10-CM

## 2024-04-29 DIAGNOSIS — R05.9 COUGH, UNSPECIFIED TYPE: ICD-10-CM

## 2024-04-29 LAB
SARS-COV-2 AG UPPER RESP QL IA: NEGATIVE
VALID CONTROL: NORMAL

## 2024-04-29 PROCEDURE — 3078F DIAST BP <80 MM HG: CPT | Performed by: NURSE PRACTITIONER

## 2024-04-29 PROCEDURE — 87811 SARS-COV-2 COVID19 W/OPTIC: CPT | Performed by: NURSE PRACTITIONER

## 2024-04-29 PROCEDURE — 3725F SCREEN DEPRESSION PERFORMED: CPT | Performed by: NURSE PRACTITIONER

## 2024-04-29 PROCEDURE — 99214 OFFICE O/P EST MOD 30 MIN: CPT | Performed by: NURSE PRACTITIONER

## 2024-04-29 PROCEDURE — 3074F SYST BP LT 130 MM HG: CPT | Performed by: NURSE PRACTITIONER

## 2024-04-29 RX ORDER — BUSPIRONE HYDROCHLORIDE 15 MG/1
15 TABLET ORAL 3 TIMES DAILY
Qty: 270 TABLET | Refills: 2 | Status: SHIPPED | OUTPATIENT
Start: 2024-04-29

## 2024-04-29 RX ORDER — AMOXICILLIN AND CLAVULANATE POTASSIUM 875; 125 MG/1; MG/1
1 TABLET, FILM COATED ORAL EVERY 12 HOURS SCHEDULED
Qty: 14 TABLET | Refills: 0 | Status: SHIPPED | OUTPATIENT
Start: 2024-04-29 | End: 2024-05-06

## 2024-04-29 NOTE — PROGRESS NOTES
Name: Yesi Link      : 1960      MRN: [id number]  Encounter Provider: MELECIO Durant  Encounter Date: 2024   Encounter department: [facility] [address]    Assessment & Plan     1. Benign hypertension  Assessment & Plan:  Blood pressure is well-managed with amlodipine daily.      2. Anxiety  Assessment & Plan:  Recommend continuing on citalopram, patient declines.  Advised using sparingly as needed and continue on BuSpar 3 times daily.    Orders:  -     busPIRone (BUSPAR) 15 mg tablet; Take 1 tablet (15 mg total) by mouth 3 (three) times a day    3. Acute otitis media, unspecified otitis media type  -     amoxicillin-clavulanate (AUGMENTIN) 875-125 mg per tablet; Take 1 tablet by mouth every 12 (twelve) hours for 7 days    4. Moderate episode of recurrent major depressive disorder (HCC)  Assessment & Plan:  Patient stopped her citalopram.  I did discuss that I think she should continue this medication, but patient declines at this time.      5. Mixed hyperlipidemia  Assessment & Plan:  Reviewed lipid panel with patient, there has been some improvement.  Given information on Mediterranean diet.             Subjective      Patient presents for sick visit and follow-up. She was taking advil and robitussin. This started 1 week ago.  She stopped citalopram as she felt her anxiety was okay.  She has been using Xanax.  For anxiety.  It is coming up on the anniversary of her 's death.      Review of Systems   Constitutional:  Positive for chills, diaphoresis and fatigue. Negative for appetite change and fever.   HENT:  Positive for congestion, ear pain, rhinorrhea and sore throat. Negative for sinus pressure and sinus pain.    Respiratory:  Positive for cough. Negative for shortness of breath.    Gastrointestinal:  Negative for diarrhea, nausea and vomiting.   Neurological:  Positive for headaches.   Psychiatric/Behavioral:  Positive for dysphoric mood. The  "patient is nervous/anxious.        Current Outpatient Medications on File Prior to Visit   Medication Sig    ALPRAZolam (XANAX) 0.5 mg tablet Take 1 tablet (0.5 mg total) by mouth 2 (two) times a day as needed for anxiety    amLODIPine (NORVASC) 5 mg tablet TAKE 1 TABLET (5 MG TOTAL) BY MOUTH DAILY.    cholecalciferol (VITAMIN D3) 1,000 units tablet Take 2 tablets (2,000 Units total) by mouth daily    omeprazole (PriLOSEC) 20 mg delayed release capsule Take 1 capsule (20 mg total) by mouth daily    [DISCONTINUED] busPIRone (BUSPAR) 15 mg tablet TAKE 1 TABLET (15 MG TOTAL) BY MOUTH 3 TIMES A DAY AS NEEDED FOR ANXIETY    [DISCONTINUED] citalopram (CeleXA) 10 mg tablet Take 1 tablet (10 mg total) by mouth daily       Objective     /78   Pulse 84   Temp (!) 97.2 °F (36.2 °C)   Ht 5' 3\" (1.6 m)   Wt 71.7 kg (158 lb)   SpO2 94%   BMI 27.99 kg/m²     Physical Exam  Constitutional:       Appearance: She is well-developed.   HENT:      Right Ear: Tympanic membrane is injected.      Left Ear: Tympanic membrane is injected.   Cardiovascular:      Rate and Rhythm: Normal rate and regular rhythm.      Heart sounds: Normal heart sounds. No murmur heard.  Pulmonary:      Effort: Pulmonary effort is normal. No respiratory distress.      Breath sounds: Normal breath sounds.   Skin:     General: Skin is warm and dry.   Neurological:      Mental Status: She is alert and oriented to person, place, and time.       MELECIO Durant    "

## 2024-04-29 NOTE — ASSESSMENT & PLAN NOTE
Reviewed lipid panel with patient, there has been some improvement.  Given information on Mediterranean diet.

## 2024-04-29 NOTE — ASSESSMENT & PLAN NOTE
Patient stopped her citalopram.  I did discuss that I think she should continue this medication, but patient declines at this time.

## 2024-04-29 NOTE — ASSESSMENT & PLAN NOTE
Recommend continuing on citalopram, patient declines.  Advised using sparingly as needed and continue on BuSpar 3 times daily.

## 2024-04-30 RX ORDER — ALPRAZOLAM 0.5 MG/1
0.5 TABLET ORAL 2 TIMES DAILY PRN
Qty: 30 TABLET | Refills: 0 | Status: SHIPPED | OUTPATIENT
Start: 2024-04-30

## 2024-05-02 ENCOUNTER — APPOINTMENT (OUTPATIENT)
Age: 64
End: 2024-05-02
Payer: COMMERCIAL

## 2024-05-02 ENCOUNTER — NURSE TRIAGE (OUTPATIENT)
Age: 64
End: 2024-05-02

## 2024-05-02 DIAGNOSIS — R05.1 ACUTE COUGH: Primary | ICD-10-CM

## 2024-05-02 DIAGNOSIS — R05.1 ACUTE COUGH: ICD-10-CM

## 2024-05-02 PROCEDURE — 71046 X-RAY EXAM CHEST 2 VIEWS: CPT

## 2024-05-02 RX ORDER — BENZONATATE 200 MG/1
200 CAPSULE ORAL 3 TIMES DAILY PRN
Qty: 20 CAPSULE | Refills: 0 | Status: SHIPPED | OUTPATIENT
Start: 2024-05-02

## 2024-05-02 NOTE — TELEPHONE ENCOUNTER
"Pt still with URI sx's-cough, congestion, fatigue. Denies any fever. 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 She was also advised by her boss that she should ask for a chest xray. Pt states that she does not currently have any SOB. Just gets very fatigued easily. Please call pt back with recommendations.       Reason for Disposition   Cough with cold symptoms (e.g., runny nose, postnasal drip, throat clearing)    Answer Assessment - Initial Assessment Questions  1. ONSET: \"When did the cough begin?\"       2 wks ago  2. SEVERITY: \"How bad is the cough today?\"       moderate  3. SPUTUM: \"Describe the color of your sputum\" (none, dry cough; clear, white, yellow, green)      Mostly dry  4. HEMOPTYSIS: \"Are you coughing up any blood?\" If so ask: \"How much?\" (flecks, streaks, tablespoons, etc.)      denies  5. DIFFICULTY BREATHING: \"Are you having difficulty breathing?\" If Yes, ask: \"How bad is it?\" (e.g., mild, moderate, severe)     - MILD: No SOB at rest, mild SOB with walking, speaks normally in sentences, can lay down, no retractions, pulse < 100.     - MODERATE: SOB at rest, SOB with minimal exertion and prefers to sit, cannot lie down flat, speaks in phrases, mild retractions, audible wheezing, pulse 100-120.     - SEVERE: Very SOB at rest, speaks in single words, struggling to breathe, sitting hunched forward, retractions, pulse > 120       Denies   6. FEVER: \"Do you have a fever?\" If Yes, ask: \"What is your temperature, how was it measured, and when did it start?\"      denies  7. CARDIAC HISTORY: \"Do you have any history of heart disease?\" (e.g., heart attack, congestive heart failure)       denies  8. LUNG HISTORY: \"Do you have any history of lung disease?\"  (e.g., pulmonary embolus, asthma, emphysema)      denies  9. PE RISK FACTORS: \"Do you have a history of blood clots?\" (or: recent major surgery, recent prolonged travel, bedridden)      denies  10. OTHER SYMPTOMS: \"Do you have " "any other symptoms?\" (e.g., runny nose, wheezing, chest pain)        denies    Protocols used: Cough-ADULT-OH    "

## 2024-05-02 NOTE — TELEPHONE ENCOUNTER
Called patient and she is aware and will have the chest x-ray done and will  the tessalon as well. I did let her know we would extend the note but she said she has to go back tomorrow because they need her. I did advise she could ear a mask and proper hand washing as well she was concerned if she was contagious

## 2024-05-21 DIAGNOSIS — I10 BENIGN HYPERTENSION: ICD-10-CM

## 2024-05-21 RX ORDER — AMLODIPINE BESYLATE 5 MG/1
5 TABLET ORAL DAILY
Qty: 90 TABLET | Refills: 1 | Status: SHIPPED | OUTPATIENT
Start: 2024-05-21

## 2024-05-23 DIAGNOSIS — K21.9 GASTROESOPHAGEAL REFLUX DISEASE WITHOUT ESOPHAGITIS: ICD-10-CM

## 2024-05-23 RX ORDER — OMEPRAZOLE 20 MG/1
20 CAPSULE, DELAYED RELEASE ORAL DAILY
Qty: 90 CAPSULE | Refills: 1 | Status: SHIPPED | OUTPATIENT
Start: 2024-05-23

## 2024-06-24 DIAGNOSIS — F41.9 ANXIETY: ICD-10-CM

## 2024-06-24 RX ORDER — ALPRAZOLAM 0.5 MG/1
0.5 TABLET ORAL 2 TIMES DAILY PRN
Qty: 30 TABLET | Refills: 0 | Status: SHIPPED | OUTPATIENT
Start: 2024-06-24

## 2024-06-24 NOTE — TELEPHONE ENCOUNTER
Reason for call:   [x] Refill   [] Prior Auth  [] Other:     Office:   [x] PCP/Provider -   [] Specialty/Provider -     Medication:   Alprazolam 0.5mg- take 1 tablet by mouth 2 times a day as needed      Pharmacy: [facility]    Does the patient have enough for 3 days?   [] Yes   [x] No - Send as HP to POD

## 2024-07-28 ENCOUNTER — OFFICE VISIT (OUTPATIENT)
Age: 64
End: 2024-07-28
Payer: COMMERCIAL

## 2024-07-28 VITALS
WEIGHT: 161 LBS | DIASTOLIC BLOOD PRESSURE: 79 MMHG | OXYGEN SATURATION: 98 % | HEART RATE: 69 BPM | BODY MASS INDEX: 28.52 KG/M2 | SYSTOLIC BLOOD PRESSURE: 164 MMHG | RESPIRATION RATE: 18 BRPM | TEMPERATURE: 97.1 F

## 2024-07-28 DIAGNOSIS — K52.89 OTHER NONINFECTIOUS GASTROENTERITIS: Primary | ICD-10-CM

## 2024-07-28 DIAGNOSIS — R10.30 LOWER ABDOMINAL PAIN: ICD-10-CM

## 2024-07-28 DIAGNOSIS — R14.3 FLATULENCE: ICD-10-CM

## 2024-07-28 LAB
SL AMB  POCT GLUCOSE, UA: NEGATIVE
SL AMB LEUKOCYTE ESTERASE,UA: ABNORMAL
SL AMB POCT BILIRUBIN,UA: NEGATIVE
SL AMB POCT BLOOD,UA: NEGATIVE
SL AMB POCT CLARITY,UA: ABNORMAL
SL AMB POCT COLOR,UA: YELLOW
SL AMB POCT KETONES,UA: NEGATIVE
SL AMB POCT NITRITE,UA: NEGATIVE
SL AMB POCT PH,UA: 5
SL AMB POCT SPECIFIC GRAVITY,UA: 1.02
SL AMB POCT URINE PROTEIN: NEGATIVE
SL AMB POCT UROBILINOGEN: 0.2

## 2024-07-28 PROCEDURE — 81002 URINALYSIS NONAUTO W/O SCOPE: CPT | Performed by: PHYSICIAN ASSISTANT

## 2024-07-28 PROCEDURE — 87086 URINE CULTURE/COLONY COUNT: CPT | Performed by: PHYSICIAN ASSISTANT

## 2024-07-28 PROCEDURE — 99214 OFFICE O/P EST MOD 30 MIN: CPT | Performed by: PHYSICIAN ASSISTANT

## 2024-07-28 NOTE — PATIENT INSTRUCTIONS
"Patient Education     Viral gastroenteritis in adults   The Basics   Written by the doctors and editors at [facility]   What is viral gastroenteritis? -- Viral gastroenteritis is an infection that can cause diarrhea and vomiting. It happens when a person's stomach and intestines get infected with a virus (figure 1). One of the most common causes of gastroenteritis is norovirus. But other viruses can cause it, too.  People can get viral gastroenteritis if they:   Touch an infected person or a surface with the virus on it, and then don't wash their hands   Eat foods or drink liquids with the virus in them. If people with the virus don't wash their hands, they can spread it to food or liquids they touch.  What are the symptoms of viral gastroenteritis? -- The infection causes diarrhea and vomiting. People can have either diarrhea or vomiting, or both. These symptoms usually start suddenly, and can be severe.  Viral gastroenteritis can also cause:   Fever   Headache or muscle aches   Belly pain or cramping   Loss of appetite  If you have a lot of diarrhea and vomiting, your body can lose too much water. This is known as \"dehydration.\" Dehydration can make you feel thirsty, tired, dizzy, or even confused. It can also make your urine look dark yellow.  Severe dehydration can be life-threatening. Older people are more likely to get severe dehydration.  Will I need tests? -- Not usually. Your doctor or nurse should be able to tell if you have viral gastroenteritis by learning about your symptoms and doing an exam. But the doctor or nurse might do tests to check for dehydration or to see which virus is causing the infection. These tests can include:   Blood tests   Urine tests   Tests on a sample of bowel movement  Is there anything I can do on my own to feel better? -- Yes. You need to replace the body's fluids that are lost through vomiting and diarrhea:   Drink fluids when you are able. It might help to take small sips " "every 15 to 30 minutes. Try to drink more as you start to feel better.   When you have a lot of vomiting or diarrhea, your body loses both water and salt. Drinking fluids that contain some salt can help replace what your body has lost. Examples include \"oral rehydration solutions,\" sports drinks, and broth. If you drink a lot of plain water, make sure you are also eating. This will help your body keep the right salt and water balance.   Avoid drinks with a lot of sugar, like juice or soda. Avoid alcohol, too.   Eat when you are able. If you can keep food down, it's best to eat lean meats, fruits, vegetables, and whole-grain breads and cereals. Avoid eating foods with a lot of fat or sugar, which can make symptoms worse.  If you are an adult younger than 65 and you have a new bout of diarrhea, and no fever and no blood in your bowel movements, you can take medicine to stop diarrhea such as loperamide (brand name: Imodium) for 1 to 2 days. But if you are older than 65, have a fever, or have blood in your bowel movements, do not take these medicines without checking with your doctor.  If you have diabetes, you might need to check your blood sugar more often until you feel better. Ask your doctor or nurse about this.  Should I call the doctor or nurse? -- Call the doctor or nurse if you:   Have any symptoms of dehydration, like feeling very tired, thirsty, dizzy, or confused   Have diarrhea or vomiting that lasts longer than a few days   Vomit up blood, have bloody diarrhea, or have severe belly pain   Haven't been able to drink anything for many hours   Haven't needed to urinate in the past 6 to 8 hours (during the day)  How is viral gastroenteritis treated? -- Most people do not need any treatment, because their symptoms will get better on their own. But people with severe dehydration might need treatment in the hospital. This involves getting fluids through a thin tube that goes into a vein, called an \"IV.\"  Doctors " "do not treat viral gastroenteritis with antibiotics. That's because antibiotics treat infections that are caused by bacteria, not viruses.  Can viral gastroenteritis be prevented? -- Sometimes. To lower the chance of getting or spreading the infection, wash your hands well with soap and water:   After you use the bathroom   Before you eat   Before you prepare food  Also, if you are caring for a child in diapers, wash your hands well after changing diapers. Do not change diapers near where you cook or eat food.  All topics are updated as new evidence becomes available and our peer review process is complete.  This topic retrieved from [employer] on: Mar 06, 2024.  Topic 79936 Version 17.0  Release: 32.2.4 - C32.64  © 2024 UpToDate, Inc. and/or its affiliates. All rights reserved.  figure 1: Digestive system     This drawing shows the organs in the body that process food. Together, these organs are called the \"digestive system\" or \"digestive tract.\" As food travels through this system, the body absorbs nutrients and water.  Graphic 53123 Version 5.0  Consumer Information Use and Disclaimer   Disclaimer: This generalized information is a limited summary of diagnosis, treatment, and/or medication information. It is not meant to be comprehensive and should be used as a tool to help the user understand and/or assess potential diagnostic and treatment options. It does NOT include all information about conditions, treatments, medications, side effects, or risks that may apply to a specific patient. It is not intended to be medical advice or a substitute for the medical advice, diagnosis, or treatment of a health care provider based on the health care provider's examination and assessment of a patient's specific and unique circumstances. Patients must speak with a health care provider for complete information about their health, medical questions, and treatment options, including any risks or benefits regarding use of " medications. This information does not endorse any treatments or medications as safe, effective, or approved for treating a specific patient. UpToDate, Inc. and its affiliates disclaim any warranty or liability relating to this information or the use thereof.The use of this information is governed by the Terms of Use, available at https://www.[employer] [facility].com/en/know/clinical-effectiveness-terms. 2024© UpToDate, Inc. and its affiliates and/or licensors. All rights reserved.  Copyright   © 2024 UpToDate, Inc. and/or its affiliates. All rights reserved.

## 2024-07-28 NOTE — PROGRESS NOTES
[facility]        NAME: Yesi Link is a 64 y.o. female  : 1960    MRN: [id number]  DATE: 2024  TIME: 12:19 PM    Assessment and Plan   Other noninfectious gastroenteritis [K52.89]  1. Other noninfectious gastroenteritis        2. Lower abdominal pain  POCT urine dip    Urine culture      3. Flatulence  POCT urine dip    Urine culture            Patient Instructions     Your urinalysis was unremarkable a urine culture was sent and pending    Continue to use Pepto-Bismol as directed on package insert.  Not to exceed 8 doses in 24 hours  Increase fluid intake and remain well-hydrated  Probiotics as discussed  Either take Pepcid or your Prilosec but do not take both at the same time.  Follow-up with your GI specialist as discussed    Follow up with PCP in 3-5 days.  Proceed to  ER if symptoms worsen.    If tests are performed, our office will contact you with results only if changes need to made to the care plan discussed with you at the visit. [facility]    Chief Complaint     Chief Complaint   Patient presents with    Abdominal Pain     Pt states she has pain under her umbilicus for 2 days. Pt believes it is gas. OTC pepto bismol, gas x, dramamine for seferino          History of Present Illness       Abdominal Pain  This is a new problem. The current episode started yesterday. The problem occurs constantly. The most recent episode lasted 2 days. The pain is located in the suprapubic region. The pain is at a severity of 7/10. The quality of the pain is cramping. The abdominal pain does not radiate. Associated symptoms include belching and flatus. Pertinent negatives include no anorexia, constipation, diarrhea, dysuria, fever, frequency, headaches, hematochezia, hematuria, melena, myalgias, nausea, vomiting or weight loss. Nothing aggravates the pain. She has tried H2 blockers and antacids for the symptoms. The treatment provided mild relief.  Prior diagnostic workup includes GI consult. Her past medical history is significant for GERD. There is no history of abdominal surgery or irritable bowel syndrome.       Review of Systems   Review of Systems   Constitutional:  Positive for appetite change. Negative for activity change, chills, fatigue, fever and weight loss.   HENT:  Negative for congestion.    Respiratory:  Negative for cough.    Gastrointestinal:  Positive for abdominal pain and flatus. Negative for anorexia, constipation, diarrhea, hematochezia, melena, nausea and vomiting.   Genitourinary:  Negative for dysuria, frequency and hematuria.   Musculoskeletal:  Negative for myalgias.   Neurological:  Negative for headaches.         Current Medications       Current Outpatient Medications:     ALPRAZolam (XANAX) 0.5 mg tablet, Take 1 tablet (0.5 mg total) by mouth 2 (two) times a day as needed for anxiety, Disp: 30 tablet, Rfl: 0    amLODIPine (NORVASC) 5 mg tablet, TAKE 1 TABLET (5 MG TOTAL) BY MOUTH DAILY., Disp: 90 tablet, Rfl: 1    busPIRone (BUSPAR) 15 mg tablet, Take 1 tablet (15 mg total) by mouth 3 (three) times a day, Disp: 270 tablet, Rfl: 2    cholecalciferol (VITAMIN D3) 1,000 units tablet, Take 2 tablets (2,000 Units total) by mouth daily, Disp: 60 tablet, Rfl: 3    omeprazole (PriLOSEC) 20 mg delayed release capsule, TAKE 1 CAPSULE BY MOUTH EVERY DAY, Disp: 90 capsule, Rfl: 1    benzonatate (TESSALON) 200 MG capsule, Take 1 capsule (200 mg total) by mouth 3 (three) times a day as needed for cough (Patient not taking: Reported on 7/28/2024), Disp: 20 capsule, Rfl: 0    Current Allergies     Allergies as of 07/28/2024 - Reviewed 07/28/2024   Allergen Reaction Noted    Meloxicam Throat Swelling and Anaphylaxis 03/10/2020    Metaxalone Throat Swelling and Anaphylaxis 03/10/2020            The following portions of the patient's history were reviewed and updated as appropriate: allergies, current medications, past family history, past  medical history, past social history, past surgical history and problem list.     Past Medical History:   Diagnosis Date    Depression with anxiety 11/8/2018       History reviewed. No pertinent surgical history.    Family History   Problem Relation Age of Onset    Heart attack Mother         MI    Heart attack Father         MI    Migraines Sister     No Known Problems Maternal Grandfather     No Known Problems Paternal Grandfather     Heart attack Brother         MI    No Known Problems Maternal Aunt     No Known Problems Maternal Aunt     No Known Problems Maternal Aunt     No Known Problems Maternal Aunt     Breast cancer Neg Hx          Medications have been verified.        Objective   /79   Pulse 69   Temp (!) 97.1 °F (36.2 °C)   Resp 18   Wt 73 kg (161 lb)   SpO2 98%   BMI 28.52 kg/m²        Physical Exam     Physical Exam  Vitals and nursing note reviewed.   Constitutional:       General: She is not in acute distress.     Appearance: She is well-developed and normal weight. She is not ill-appearing, toxic-appearing or diaphoretic.   HENT:      Mouth/Throat:      Mouth: Mucous membranes are moist.      Pharynx: Oropharynx is clear.   Eyes:      Extraocular Movements: Extraocular movements intact.      Pupils: Pupils are equal, round, and reactive to light.   Cardiovascular:      Rate and Rhythm: Normal rate and regular rhythm.      Heart sounds: Normal heart sounds.   Pulmonary:      Effort: Pulmonary effort is normal. No respiratory distress.      Breath sounds: Normal breath sounds.   Abdominal:      General: Abdomen is flat. Bowel sounds are normal. There is no abdominal bruit. There are no signs of injury.      Palpations: Abdomen is soft. There is no hepatomegaly, splenomegaly or mass.      Tenderness: There is abdominal tenderness in the suprapubic area. There is no right CVA tenderness, left CVA tenderness, guarding or rebound. Negative signs include Nicholson's sign.   Skin:     General:  Skin is warm and dry.   Neurological:      General: No focal deficit present.      Mental Status: She is alert and oriented to person, place, and time.   Psychiatric:         Mood and Affect: Mood normal.         Behavior: Behavior normal.

## 2024-07-29 DIAGNOSIS — F41.9 ANXIETY: ICD-10-CM

## 2024-07-29 LAB — BACTERIA UR CULT: NORMAL

## 2024-07-29 RX ORDER — ALPRAZOLAM 0.5 MG/1
0.5 TABLET ORAL 2 TIMES DAILY PRN
Qty: 30 TABLET | Refills: 0 | Status: SHIPPED | OUTPATIENT
Start: 2024-07-29

## 2024-07-29 NOTE — TELEPHONE ENCOUNTER
Reason for call:   [x] Refill   [] Prior Auth  [] Other:     Office:   [x] PCP/Provider - MELECIO Durant   [] Specialty/Provider -     Medication:     ALPRAZolam (XANAX) 0.5 mg tablet 0.5 mg, 2 times daily PRN # 30         Pharmacy: [facility]/pharmacy #[id number] - [facility] [address] [facility] - [address]     Does the patient have enough for 3 days?   [] Yes   [x] No - Send as HP to POD

## 2024-08-27 DIAGNOSIS — F41.9 ANXIETY: ICD-10-CM

## 2024-08-27 RX ORDER — ALPRAZOLAM 0.5 MG
0.5 TABLET ORAL 2 TIMES DAILY PRN
Qty: 30 TABLET | Refills: 0 | Status: SHIPPED | OUTPATIENT
Start: 2024-08-27

## 2024-08-27 NOTE — TELEPHONE ENCOUNTER
Patient says that her anxiety is really bad.  She says that she never really started the citalopram 10mg because she read the possible side affects which include heart attack.  She asking if she should start it now because her anxiety is so bad.  Can she take it with the xanax 0.5mg (which she only has 2 left). Please contact patient and advise.  Thank you.

## 2024-09-06 ENCOUNTER — TELEMEDICINE (OUTPATIENT)
Dept: BEHAVIORAL/MENTAL HEALTH CLINIC | Facility: CLINIC | Age: 64
End: 2024-09-06
Payer: COMMERCIAL

## 2024-09-06 DIAGNOSIS — F33.1 MODERATE EPISODE OF RECURRENT MAJOR DEPRESSIVE DISORDER (HCC): ICD-10-CM

## 2024-09-06 DIAGNOSIS — F41.9 ANXIETY: Primary | ICD-10-CM

## 2024-09-06 PROCEDURE — 90834 PSYTX W PT 45 MINUTES: CPT | Performed by: SOCIAL WORKER

## 2024-09-09 ENCOUNTER — HOSPITAL ENCOUNTER (OUTPATIENT)
Age: 64
Discharge: HOME | End: 2024-09-09
Payer: COMMERCIAL

## 2024-09-09 DIAGNOSIS — Z12.31 ENCOUNTER FOR SCREENING MAMMOGRAM FOR MALIGNANT NEOPLASM OF BREAST: ICD-10-CM

## 2024-09-09 PROCEDURE — 77063 BREAST TOMOSYNTHESIS BI: CPT

## 2024-09-09 PROCEDURE — 77067 SCR MAMMO BI INCL CAD: CPT

## 2024-09-09 NOTE — PSYCH
"Behavioral Health Psychotherapy Progress Note    Psychotherapy Provided: Individual Psychotherapy     1. Anxiety        2. Moderate episode of recurrent major depressive disorder (HCC)              Visit start and stop times:    09/06/24  Start Time: 1315  Stop Time: 1400  Total Visit Time: 45 minutes  Virtual Regular Visit  Therapist met w/pt for individual session.  Pt shared that she had a random panic attack a few weeks ago and her anxiety  has been heightened since.  She stated she wasn't sure what was happening and where it came from so she asked to begin meeting w/therapist again.  Therapist explored with pt what has been going on.  Therapist also reminded pt that this tends to be a difficult time for her due to it being the death anniversary of her  and his birthday.  Pt agreed and didn't realize that this would happen again because she was \"better.\"   Therapist explained to pt grief and anxiety and at times how they may be unpredictable.  Pt then began to share that there have been a few underlying this contributing to her increased anxiety as well. She stated that she has been thinking about the past more which has created a lot more feelings.  Therapist and pt reviewed coping skills that could be beneficial for pt.      Verification of patient location:    Patient is located at Home in the following state in which I hold an active license [place]      Assessment/Plan: f/u in one week    Problem List Items Addressed This Visit       Anxiety - Primary    Moderate episode of recurrent major depressive disorder (HCC)              Reason for visit is No chief complaint on file.       Encounter provider Naty Dickerson      Recent Visits  Date Type Provider Dept   09/06/24 Telemedicine Naty Dickerson [facility] [address]   Showing recent visits within past 7 days and meeting all other requirements  Future Appointments  No visits were found meeting these conditions.  Showing future " appointments within next 150 days and meeting all other requirements       The patient was identified by name and date of birth. Yesi Link was informed that this is a telemedicine visit and that the visit is being conducted throughthe Epic Embedded platform. She agrees to proceed..  My office door was closed. No one else was in the room.  She acknowledged consent and understanding of privacy and security of the video platform. The patient has agreed to participate and understands they can discontinue the visit at any time.    Patient is aware this is a billable service.     Subjective  Yesi Link is a 64 y.o. female  .      HPI     Past Medical History:   Diagnosis Date    Depression with anxiety 11/8/2018       No past surgical history on file.    Current Outpatient Medications   Medication Sig Dispense Refill    ALPRAZolam (XANAX) 0.5 mg tablet Take 1 tablet (0.5 mg total) by mouth 2 (two) times a day as needed for anxiety 30 tablet 0    amLODIPine (NORVASC) 5 mg tablet TAKE 1 TABLET (5 MG TOTAL) BY MOUTH DAILY. 90 tablet 1    benzonatate (TESSALON) 200 MG capsule Take 1 capsule (200 mg total) by mouth 3 (three) times a day as needed for cough (Patient not taking: Reported on 7/28/2024) 20 capsule 0    busPIRone (BUSPAR) 15 mg tablet Take 1 tablet (15 mg total) by mouth 3 (three) times a day 270 tablet 2    cholecalciferol (VITAMIN D3) 1,000 units tablet Take 2 tablets (2,000 Units total) by mouth daily 60 tablet 3    omeprazole (PriLOSEC) 20 mg delayed release capsule TAKE 1 CAPSULE BY MOUTH EVERY DAY 90 capsule 1     No current facility-administered medications for this visit.        Allergies   Allergen Reactions    Meloxicam Throat Swelling and Anaphylaxis    Metaxalone Throat Swelling and Anaphylaxis       Review of Systems    Video Exam    There were no vitals filed for this visit.    Physical Exam Pt denied any SI/Hi/Ah/VH

## 2024-09-25 ENCOUNTER — OFFICE VISIT (OUTPATIENT)
Dept: FAMILY MEDICINE CLINIC | Facility: CLINIC | Age: 64
End: 2024-09-25
Payer: COMMERCIAL

## 2024-09-25 ENCOUNTER — NURSE TRIAGE (OUTPATIENT)
Dept: OTHER | Facility: OTHER | Age: 64
End: 2024-09-25

## 2024-09-25 VITALS
WEIGHT: 164.6 LBS | DIASTOLIC BLOOD PRESSURE: 80 MMHG | SYSTOLIC BLOOD PRESSURE: 138 MMHG | OXYGEN SATURATION: 97 % | HEIGHT: 63 IN | BODY MASS INDEX: 29.16 KG/M2 | HEART RATE: 69 BPM

## 2024-09-25 DIAGNOSIS — K21.9 GASTROESOPHAGEAL REFLUX DISEASE WITHOUT ESOPHAGITIS: Primary | ICD-10-CM

## 2024-09-25 DIAGNOSIS — F41.9 ANXIETY: ICD-10-CM

## 2024-09-25 DIAGNOSIS — I10 BENIGN HYPERTENSION: ICD-10-CM

## 2024-09-25 PROCEDURE — 99214 OFFICE O/P EST MOD 30 MIN: CPT | Performed by: FAMILY MEDICINE

## 2024-09-25 RX ORDER — ALPRAZOLAM 0.5 MG
0.5 TABLET ORAL 2 TIMES DAILY PRN
Qty: 30 TABLET | Refills: 0 | Status: SHIPPED | OUTPATIENT
Start: 2024-09-25

## 2024-09-25 NOTE — ASSESSMENT & PLAN NOTE
Discussed and reviewed plan of care, recommended restart Celexa previously prescribed, continue with Valium on an as-needed basis, continue care with behavioral health  Orders:    ALPRAZolam (XANAX) 0.5 mg tablet; Take 1 tablet (0.5 mg total) by mouth 2 (two) times a day as needed for anxiety

## 2024-09-25 NOTE — TELEPHONE ENCOUNTER
Regarding: apt/anxiety  ----- Message from Sabra DUMONT sent at 9/25/2024  7:04 AM EDT -----  Patient is calling for an apt today.  She is stating she is having bad anxiety  Please call back

## 2024-09-25 NOTE — TELEPHONE ENCOUNTER
Appointment given with Wm Laz MAJANO at 1000 today. Patient did not want to return to [facility] because she states that they do not help her.  Encouraged to keep appointment with Nicolasa Saleem on Friday -her therapist.  She will do meditation yolie now.

## 2024-09-25 NOTE — PROGRESS NOTES
"Ambulatory Visit  Name: Yesi Link      : 1960      MRN: [id number]  Encounter Provider: MELECIO Conroy  Encounter Date: 2024   Encounter department: [facility] [address]    Assessment & Plan  Anxiety  Discussed and reviewed plan of care, recommended restart Celexa previously prescribed, continue with Valium on an as-needed basis, continue care with behavioral health  Orders:    ALPRAZolam (XANAX) 0.5 mg tablet; Take 1 tablet (0.5 mg total) by mouth 2 (two) times a day as needed for anxiety    Gastroesophageal reflux disease without esophagitis  Stressed importance of dietary modification/management continue use of PPI       Benign hypertension  Stable within parameters, continue amlodipine 5          History of Present Illness     Having a hard time ,   Usually this time of yr   Passing of  6yr prior   Seen by Nicolasa castrejonx prn , buspar   C/o anxiety , heart racing ,with rapid breathing sob , which then makes me feel like burping , gerd  was afraid to take celexa , initially was doing better but then became \"freaked out on reading the se\"    Anxiety  Symptoms include nervous/anxious behavior. Patient reports no chest pain, dizziness, nausea, palpitations, shortness of breath or suicidal ideas.             Review of Systems   Constitutional:  Negative for appetite change, chills, fever and unexpected weight change.   HENT:  Negative for congestion, dental problem, ear pain, hearing loss, postnasal drip, rhinorrhea, sinus pressure, sinus pain, sneezing, sore throat, tinnitus and voice change.    Eyes:  Negative for visual disturbance.   Respiratory:  Negative for apnea, cough, chest tightness and shortness of breath.    Cardiovascular:  Negative for chest pain, palpitations and leg swelling.   Gastrointestinal:  Negative for abdominal pain, blood in stool, constipation, diarrhea, nausea and vomiting.   Endocrine: Negative for cold intolerance, " "heat intolerance, polydipsia, polyphagia and polyuria.   Genitourinary:  Negative for decreased urine volume, difficulty urinating, dysuria, frequency and hematuria.   Musculoskeletal:  Negative for arthralgias, back pain, gait problem, joint swelling and myalgias.   Skin:  Negative for color change, rash and wound.   Allergic/Immunologic: Negative for environmental allergies and food allergies.   Neurological:  Negative for dizziness, syncope, weakness, light-headedness, numbness and headaches.   Hematological:  Negative for adenopathy. Does not bruise/bleed easily.   Psychiatric/Behavioral:  Negative for sleep disturbance and suicidal ideas. The patient is nervous/anxious.            Objective     /80 (BP Location: Left arm, Patient Position: Sitting, Cuff Size: Standard)   Pulse 69   Ht 5' 3\" (1.6 m)   Wt 74.7 kg (164 lb 9.6 oz)   SpO2 97%   BMI 29.16 kg/m²     Physical Exam  Constitutional:       Appearance: She is well-developed.   HENT:      Head: Normocephalic and atraumatic.   Cardiovascular:      Rate and Rhythm: Normal rate and regular rhythm.      Heart sounds: Normal heart sounds.   Pulmonary:      Effort: Pulmonary effort is normal.      Breath sounds: Normal breath sounds.   Abdominal:      General: Bowel sounds are normal.      Palpations: Abdomen is soft.   Musculoskeletal:         General: Normal range of motion.      Cervical back: Normal range of motion and neck supple.   Skin:     General: Skin is warm and dry.   Neurological:      Mental Status: She is alert and oriented to person, place, and time.      Deep Tendon Reflexes: Reflexes are normal and symmetric.   Psychiatric:         Mood and Affect: Mood is anxious.         Behavior: Behavior normal.         Thought Content: Thought content normal. Thought content does not include homicidal or suicidal ideation. Thought content does not include homicidal or suicidal plan.         Judgment: Judgment normal.         "

## 2024-09-27 ENCOUNTER — TELEMEDICINE (OUTPATIENT)
Dept: BEHAVIORAL/MENTAL HEALTH CLINIC | Facility: CLINIC | Age: 64
End: 2024-09-27
Payer: COMMERCIAL

## 2024-09-27 DIAGNOSIS — F33.1 MODERATE EPISODE OF RECURRENT MAJOR DEPRESSIVE DISORDER (HCC): ICD-10-CM

## 2024-09-27 DIAGNOSIS — F41.9 ANXIETY: Primary | ICD-10-CM

## 2024-09-27 PROCEDURE — 90834 PSYTX W PT 45 MINUTES: CPT | Performed by: SOCIAL WORKER

## 2024-09-30 NOTE — PSYCH
Behavioral Health Psychotherapy Progress Note    Psychotherapy Provided: Individual Psychotherapy     1. Anxiety        2. Moderate episode of recurrent major depressive disorder (HCC)                Visit start and stop times:    09/27/24  Start Time: 1500  Stop Time: 1540  Total Visit Time: 40 minutes  Virtual Regular Visit  Therapist met w/pt for individual session.  Symptoms include: panic, physical shakiness, racing thoughts, feeling stuck, emotional instability, and sleep dysregulation. Pt expressed stress about not doing the breathing skill right. Therapist normalized the experience and offered encouragement to not stress about the breathing. Therapist reinforced the need to practice breathing when pt not heightened.   Therapist and pt reviewed current medications to help with racing thoughts. Pt continues to take Xanax to help with sleep. Pt revealed she has not taken the prescribed Celexa because she had concerns about a heart attack and not being able to drink. Pt shared that PCP thinks she should take Celexa. Therapist reflected that pt was calmer on the medication in the past.    Therapist reminded pt that this time of year tends to be more difficult for pt due to the anniversary of her 's death.  Therapist asked pt open ended questions in regards to what pt has been experiencing recently.  Pt shared her 's 9/19 hospital admission 6 years ago and death within 3 weeks of hospitalization. Therapist validated pt had a lot to deal with at time of 's death and in the aftermath  of his death in dealing with difficult renters and other matters. Therapist offered psychoeducation on the nature of unresolved grief and how that leaves pt open to being triggered unknowingly. Therapist observed the pt has good memories of her  life, but her unprocessed grief makes this time of year difficult for her.  Therapist and pt discussed positive distractions/hobbies pt can do on her own to help her  manage her symptoms more effectively. Therapist supported pt's idea to investigate video games at [employer].  Therapist recommended that pt have another session next week due to heightened symptoms.  Pt expressed insurance coverage issues.  Therapist assured pt that due to symptoms her insurance will not give her any problems.    Therapist will send pt information on coping skills pt can try to help ease racing thoughts. Pt will report at next session on what worked for her.    Verification of patient location:    Patient is located at Home in the following state in which I hold an active license [place]      Assessment/Plan: f/u in a week    Problem List Items Addressed This Visit       Anxiety - Primary    Moderate episode of recurrent major depressive disorder (HCC)                Reason for visit is No chief complaint on file.       Encounter provider Naty Dickerson      Recent Visits  Date Type Provider Dept   09/27/24 Telemedicine Naty Dickerson [facility] [address]   Showing recent visits within past 7 days and meeting all other requirements  Future Appointments  No visits were found meeting these conditions.  Showing future appointments within next 150 days and meeting all other requirements       The patient was identified by name and date of birth. Yesi Link was informed that this is a telemedicine visit and that the visit is being conducted throughthe Epic Embedded platform. She agrees to proceed..  My office door was closed. No one else was in the room.  She acknowledged consent and understanding of privacy and security of the video platform. The patient has agreed to participate and understands they can discontinue the visit at any time.    Patient is aware this is a billable service.     Subjective  Yesi Link is a 64 y.o. female  .      HPI     Past Medical History:   Diagnosis Date    Depression with anxiety 11/8/2018       No past surgical history on file.    Current  Outpatient Medications   Medication Sig Dispense Refill    ALPRAZolam (XANAX) 0.5 mg tablet Take 1 tablet (0.5 mg total) by mouth 2 (two) times a day as needed for anxiety 30 tablet 0    amLODIPine (NORVASC) 5 mg tablet TAKE 1 TABLET (5 MG TOTAL) BY MOUTH DAILY. 90 tablet 1    benzonatate (TESSALON) 200 MG capsule Take 1 capsule (200 mg total) by mouth 3 (three) times a day as needed for cough (Patient not taking: Reported on 7/28/2024) 20 capsule 0    busPIRone (BUSPAR) 15 mg tablet Take 1 tablet (15 mg total) by mouth 3 (three) times a day 270 tablet 2    cholecalciferol (VITAMIN D3) 1,000 units tablet Take 2 tablets (2,000 Units total) by mouth daily 60 tablet 3    omeprazole (PriLOSEC) 20 mg delayed release capsule TAKE 1 CAPSULE BY MOUTH EVERY DAY 90 capsule 1     No current facility-administered medications for this visit.        Allergies   Allergen Reactions    Meloxicam Throat Swelling and Anaphylaxis    Metaxalone Throat Swelling and Anaphylaxis       Review of Systems    Video Exam    There were no vitals filed for this visit.    Physical Exam pt denied any SI/HI/AH/VH

## 2024-10-04 ENCOUNTER — TELEMEDICINE (OUTPATIENT)
Dept: BEHAVIORAL/MENTAL HEALTH CLINIC | Facility: CLINIC | Age: 64
End: 2024-10-04
Payer: COMMERCIAL

## 2024-10-04 DIAGNOSIS — F33.1 MODERATE EPISODE OF RECURRENT MAJOR DEPRESSIVE DISORDER (HCC): ICD-10-CM

## 2024-10-04 DIAGNOSIS — F41.9 ANXIETY: Primary | ICD-10-CM

## 2024-10-04 PROCEDURE — 90832 PSYTX W PT 30 MINUTES: CPT | Performed by: SOCIAL WORKER

## 2024-10-07 NOTE — PSYCH
Behavioral Health Psychotherapy Progress Note    Psychotherapy Provided: Individual Psychotherapy     1. Anxiety        2. Moderate episode of recurrent major depressive disorder (HCC)              Visit start and stop times:    10/04/24  Start Time: 1440  Stop Time: 1515  Total Visit Time: 35 minutes  Virtual Regular Visit  Therapist met w/pt for individual session.  Pt stated that her anxiety has been better this past week.  Therapist and pt discussed factors that could have contributed to pt's improvement in symptoms.  Pt stated she was busier than usual which was a distraction for her.  She stated she didn't look at the coping skills worksheet and calming meditation that therapist sent but pt will look at it this week.  Therapist and pt discussed the importance of continuing to challenge her anxious/negative thoughts and utilizing her supports.  Therapist utilized CBT strategies with pt during session.  Pt seemed to respond well to therapist's interventions.      Verification of patient location:    Patient is located at Home in the following state in which I hold an active license [place]      Assessment/Plan: f/u in 2 weeks    Problem List Items Addressed This Visit       Anxiety - Primary    Moderate episode of recurrent major depressive disorder (HCC)            Reason for visit is No chief complaint on file.       Encounter provider Naty Dickerson      Recent Visits  Date Type Provider Dept   10/04/24 Telemedicine Naty Dickerson [facility] [address]   Showing recent visits within past 7 days and meeting all other requirements  Future Appointments  No visits were found meeting these conditions.  Showing future appointments within next 150 days and meeting all other requirements       The patient was identified by name and date of birth. Yesi RILEY Link was informed that this is a telemedicine visit and that the visit is being conducted throughthe Epic Embedded platform. She agrees to proceed..   My office door was closed. No one else was in the room.  She acknowledged consent and understanding of privacy and security of the video platform. The patient has agreed to participate and understands they can discontinue the visit at any time.    Patient is aware this is a billable service.     Subjective  Yesi Link is a 64 y.o. female  .      HPI     Past Medical History:   Diagnosis Date    Depression with anxiety 11/8/2018       No past surgical history on file.    Current Outpatient Medications   Medication Sig Dispense Refill    ALPRAZolam (XANAX) 0.5 mg tablet Take 1 tablet (0.5 mg total) by mouth 2 (two) times a day as needed for anxiety 30 tablet 0    amLODIPine (NORVASC) 5 mg tablet TAKE 1 TABLET (5 MG TOTAL) BY MOUTH DAILY. 90 tablet 1    benzonatate (TESSALON) 200 MG capsule Take 1 capsule (200 mg total) by mouth 3 (three) times a day as needed for cough (Patient not taking: Reported on 7/28/2024) 20 capsule 0    busPIRone (BUSPAR) 15 mg tablet Take 1 tablet (15 mg total) by mouth 3 (three) times a day 270 tablet 2    cholecalciferol (VITAMIN D3) 1,000 units tablet Take 2 tablets (2,000 Units total) by mouth daily 60 tablet 3    omeprazole (PriLOSEC) 20 mg delayed release capsule TAKE 1 CAPSULE BY MOUTH EVERY DAY 90 capsule 1     No current facility-administered medications for this visit.        Allergies   Allergen Reactions    Meloxicam Throat Swelling and Anaphylaxis    Metaxalone Throat Swelling and Anaphylaxis       Review of Systems    Video Exam    There were no vitals filed for this visit.    Physical Exam Pt denied any SI/HI/Ah/VH

## 2024-10-18 ENCOUNTER — TELEMEDICINE (OUTPATIENT)
Dept: BEHAVIORAL/MENTAL HEALTH CLINIC | Facility: CLINIC | Age: 64
End: 2024-10-18
Payer: COMMERCIAL

## 2024-10-18 DIAGNOSIS — F33.1 MODERATE EPISODE OF RECURRENT MAJOR DEPRESSIVE DISORDER (HCC): ICD-10-CM

## 2024-10-18 DIAGNOSIS — F41.9 ANXIETY: Primary | ICD-10-CM

## 2024-10-18 PROCEDURE — 90834 PSYTX W PT 45 MINUTES: CPT | Performed by: SOCIAL WORKER

## 2024-10-21 NOTE — PSYCH
Behavioral Health Psychotherapy Progress Note    Psychotherapy Provided: Individual Psychotherapy     1. Anxiety        2. Moderate episode of recurrent major depressive disorder (HCC)              Visit start and stop times:    10/18/24  Start Time: 1500  Stop Time: 1545  Total Visit Time: 45 minutes  Virtual Regular Visit  Therapist met w/pt for individual session.  Pt stated that she did start hyperventilating today because she was thinking about the nice weather and what she would do with her  when he was alive during this time of year.  She stated that it helped knowing that this month is difficult for her but wasn't sure how to help herself.  Therapist reminded pt of the deep breathing and the grounding skills discussed in previous sessions.    Verification of patient location:    Patient is located at Home in the following state in which I hold an active license [place]      Assessment/Plan: f/u in 2-3 weeks    Problem List Items Addressed This Visit       Anxiety - Primary    Moderate episode of recurrent major depressive disorder (HCC)            Reason for visit is No chief complaint on file.       Encounter provider Naty Dickerson      Recent Visits  Date Type Provider Dept   10/18/24 Telemedicine Naty Dickerson [facility] [address]   Showing recent visits within past 7 days and meeting all other requirements  Future Appointments  No visits were found meeting these conditions.  Showing future appointments within next 150 days and meeting all other requirements       The patient was identified by name and date of birth. Yesi RILEY Link was informed that this is a telemedicine visit and that the visit is being conducted throughthe Epic Embedded platform. She agrees to proceed..  My office door was closed. No one else was in the room.  She acknowledged consent and understanding of privacy and security of the video platform. The patient has agreed to participate and understands they can  discontinue the visit at any time.    Patient is aware this is a billable service.     Sundar Link is a 64 y.o. female  .      HPI     Past Medical History:   Diagnosis Date    Depression with anxiety 11/8/2018       No past surgical history on file.    Current Outpatient Medications   Medication Sig Dispense Refill    ALPRAZolam (XANAX) 0.5 mg tablet Take 1 tablet (0.5 mg total) by mouth 2 (two) times a day as needed for anxiety 30 tablet 0    amLODIPine (NORVASC) 5 mg tablet TAKE 1 TABLET (5 MG TOTAL) BY MOUTH DAILY. 90 tablet 1    benzonatate (TESSALON) 200 MG capsule Take 1 capsule (200 mg total) by mouth 3 (three) times a day as needed for cough (Patient not taking: Reported on 7/28/2024) 20 capsule 0    busPIRone (BUSPAR) 15 mg tablet Take 1 tablet (15 mg total) by mouth 3 (three) times a day 270 tablet 2    cholecalciferol (VITAMIN D3) 1,000 units tablet Take 2 tablets (2,000 Units total) by mouth daily 60 tablet 3    omeprazole (PriLOSEC) 20 mg delayed release capsule TAKE 1 CAPSULE BY MOUTH EVERY DAY 90 capsule 1     No current facility-administered medications for this visit.        Allergies   Allergen Reactions    Meloxicam Throat Swelling and Anaphylaxis    Metaxalone Throat Swelling and Anaphylaxis       Review of Systems    Video Exam    There were no vitals filed for this visit.    Physical Exam Pt denied any SI/HI/Ah/VH

## 2024-10-28 DIAGNOSIS — F41.9 ANXIETY: ICD-10-CM

## 2024-10-28 RX ORDER — ALPRAZOLAM 0.5 MG
0.5 TABLET ORAL 2 TIMES DAILY PRN
Qty: 30 TABLET | Refills: 0 | Status: SHIPPED | OUTPATIENT
Start: 2024-10-28

## 2024-10-28 NOTE — TELEPHONE ENCOUNTER
Medication: ALPRAZolam (XANAX)    Dose/Frequency: 0.5 mg tablet / Take 1 tablet (0.5 mg total) by mouth 2 (two) times a day as needed for anxiety.    Quantity: 30 tablet     Pharmacy: [facility]/pharmacy #[id number] - [place], [place] - [address]    Office:   [x] PCP/Provider - MELECIO Gary  [] Speciality/Provider -     Does the patient have enough for 3 days?   [] Yes   [x] No - Send as HP to POD

## 2024-11-08 ENCOUNTER — TELEMEDICINE (OUTPATIENT)
Dept: BEHAVIORAL/MENTAL HEALTH CLINIC | Facility: CLINIC | Age: 64
End: 2024-11-08
Payer: COMMERCIAL

## 2024-11-08 DIAGNOSIS — F33.1 MODERATE EPISODE OF RECURRENT MAJOR DEPRESSIVE DISORDER (HCC): ICD-10-CM

## 2024-11-08 DIAGNOSIS — F41.9 ANXIETY: Primary | ICD-10-CM

## 2024-11-08 PROCEDURE — 90834 PSYTX W PT 45 MINUTES: CPT | Performed by: SOCIAL WORKER

## 2024-11-13 NOTE — PSYCH
Behavioral Health Psychotherapy Progress Note    Psychotherapy Provided: Individual Psychotherapy     1. Anxiety        2. Moderate episode of recurrent major depressive disorder (HCC)                  Visit start and stop times:    11/8/24  Start Time: 1505  Stop Time: 1545  Total Visit Time: 40 minutes  Virtual Regular Visit    DATA: Therapist met w/pt for individual session.  Pt shared that her symptoms overall have been better.  Therapist and pt discussed how September/October tend to be hard months due to various anniversaries.  Pt stated that she has been implementing some of the breathing techniques for when she is slightly triggered.  She also shared that she has been consistent w/her physical activity which she feels has also been beneficial for her overall anxiety.    Verification of patient location:    Patient is located at Home in the following state in which I hold an active license [place]      Assessment/Plan: f/u in a month    Problem List Items Addressed This Visit       Anxiety - Primary    Moderate episode of recurrent major depressive disorder (HCC)            Reason for visit is No chief complaint on file.       Encounter provider Naty Dickerson      Recent Visits  Date Type Provider Dept   11/08/24 Telemedicine Naty Dickerson [facility] [address]   Showing recent visits within past 7 days and meeting all other requirements  Future Appointments  No visits were found meeting these conditions.  Showing future appointments within next 150 days and meeting all other requirements       The patient was identified by name and date of birth. Yesi RILEY Link was informed that this is a telemedicine visit and that the visit is being conducted throughthe Epic Embedded platform. She agrees to proceed..  My office door was closed. No one else was in the room.  She acknowledged consent and understanding of privacy and security of the video platform. The patient has agreed to participate and  understands they can discontinue the visit at any time.    Patient is aware this is a billable service.     Subjective  Yesi Link is a 64 y.o. female  .      HPI     Past Medical History:   Diagnosis Date    Depression with anxiety 11/8/2018       No past surgical history on file.    Current Outpatient Medications   Medication Sig Dispense Refill    ALPRAZolam (XANAX) 0.5 mg tablet Take 1 tablet (0.5 mg total) by mouth 2 (two) times a day as needed for anxiety 30 tablet 0    amLODIPine (NORVASC) 5 mg tablet TAKE 1 TABLET (5 MG TOTAL) BY MOUTH DAILY. 90 tablet 1    benzonatate (TESSALON) 200 MG capsule Take 1 capsule (200 mg total) by mouth 3 (three) times a day as needed for cough (Patient not taking: Reported on 7/28/2024) 20 capsule 0    busPIRone (BUSPAR) 15 mg tablet Take 1 tablet (15 mg total) by mouth 3 (three) times a day 270 tablet 2    cholecalciferol (VITAMIN D3) 1,000 units tablet Take 2 tablets (2,000 Units total) by mouth daily 60 tablet 3    omeprazole (PriLOSEC) 20 mg delayed release capsule TAKE 1 CAPSULE BY MOUTH EVERY DAY 90 capsule 1     No current facility-administered medications for this visit.        Allergies   Allergen Reactions    Meloxicam Throat Swelling and Anaphylaxis    Metaxalone Throat Swelling and Anaphylaxis       Review of Systems    Video Exam    There were no vitals filed for this visit.    Physical Exam  Pt denied any SI/HI/Ah/VH

## 2024-11-19 DIAGNOSIS — F41.9 ANXIETY: ICD-10-CM

## 2024-11-19 RX ORDER — ALPRAZOLAM 0.5 MG
0.5 TABLET ORAL 2 TIMES DAILY PRN
Qty: 30 TABLET | Refills: 0 | Status: SHIPPED | OUTPATIENT
Start: 2024-11-19

## 2024-12-02 ENCOUNTER — OFFICE VISIT (OUTPATIENT)
Dept: FAMILY MEDICINE CLINIC | Facility: CLINIC | Age: 64
End: 2024-12-02
Payer: COMMERCIAL

## 2024-12-02 VITALS
HEART RATE: 66 BPM | SYSTOLIC BLOOD PRESSURE: 120 MMHG | OXYGEN SATURATION: 96 % | BODY MASS INDEX: 28.81 KG/M2 | WEIGHT: 162.6 LBS | DIASTOLIC BLOOD PRESSURE: 70 MMHG | HEIGHT: 63 IN

## 2024-12-02 DIAGNOSIS — R73.01 IFG (IMPAIRED FASTING GLUCOSE): ICD-10-CM

## 2024-12-02 DIAGNOSIS — I10 BENIGN HYPERTENSION: ICD-10-CM

## 2024-12-02 DIAGNOSIS — B35.0 SCALP DERMATOPHYTOSIS: ICD-10-CM

## 2024-12-02 DIAGNOSIS — Z78.0 POSTMENOPAUSAL: Primary | ICD-10-CM

## 2024-12-02 DIAGNOSIS — F41.9 ANXIETY: ICD-10-CM

## 2024-12-02 DIAGNOSIS — Z00.00 HEALTHCARE MAINTENANCE: ICD-10-CM

## 2024-12-02 DIAGNOSIS — F33.1 MODERATE EPISODE OF RECURRENT MAJOR DEPRESSIVE DISORDER (HCC): ICD-10-CM

## 2024-12-02 PROCEDURE — 99214 OFFICE O/P EST MOD 30 MIN: CPT | Performed by: NURSE PRACTITIONER

## 2024-12-02 RX ORDER — CLOBETASOL PROPIONATE 0.5 MG/ML
SOLUTION TOPICAL 2 TIMES DAILY
Qty: 50 ML | Refills: 0 | Status: SHIPPED | OUTPATIENT
Start: 2024-12-02

## 2024-12-02 NOTE — PROGRESS NOTES
Name: Yesi Link      : 1960      MRN: [id number]  Encounter Provider: MELECIO Durant  Encounter Date: 2024   Encounter department: [facility] [address]  :  Assessment & Plan  Postmenopausal    Orders:    DXA bone density spine hip and pelvis; Future    IFG (impaired fasting glucose)    Orders:    Hemoglobin A1C; Future    Healthcare maintenance    Orders:    CBC and differential; Future    Comprehensive metabolic panel; Future    Lipid panel; Future    TSH, 3rd generation with Free T4 reflex; Future    Hemoglobin A1C; Future    Scalp dermatophytosis    Orders:    clobetasol (TEMOVATE) 0.05 % external solution; Apply topically 2 (two) times a day    Benign hypertension  Blood pressure is well-managed with amlodipine daily.       Anxiety  Discussed that she really should start on citalopram daily as a controller medication.  She feels she is doing okay currently with BuSpar and Xanax.  Advised to use Xanax varyingly as needed.       Moderate episode of recurrent major depressive disorder (HCC)  Feels she is doing okay overall.                History of Present Illness     Patient presents for routine follow-up.  Patient is overall feeling well.  She did have some severe anxiety in September where she needed Xanax twice daily.  She still has not started on citalopram due to fear of side effects.      Review of Systems   Constitutional:  Negative for chills and fever.   HENT:  Negative for ear pain and sore throat.    Eyes:  Negative for pain and visual disturbance.   Respiratory:  Negative for cough and shortness of breath.    Cardiovascular:  Negative for chest pain and palpitations.   Gastrointestinal:  Negative for abdominal pain and vomiting.   Genitourinary:  Negative for dysuria and hematuria.   Musculoskeletal:  Negative for arthralgias and back pain.   Skin:  Negative for color change and rash.   Neurological:  Negative for seizures and syncope.  "  Psychiatric/Behavioral:  Positive for dysphoric mood. The patient is nervous/anxious (6/10).    All other systems reviewed and are negative.    Current Outpatient Medications on File Prior to Visit   Medication Sig Dispense Refill    ALPRAZolam (XANAX) 0.5 mg tablet Take 1 tablet (0.5 mg total) by mouth 2 (two) times a day as needed for anxiety 30 tablet 0    amLODIPine (NORVASC) 5 mg tablet TAKE 1 TABLET (5 MG TOTAL) BY MOUTH DAILY. 90 tablet 1    busPIRone (BUSPAR) 15 mg tablet Take 1 tablet (15 mg total) by mouth 3 (three) times a day 270 tablet 2    cholecalciferol (VITAMIN D3) 1,000 units tablet Take 2 tablets (2,000 Units total) by mouth daily 60 tablet 3    omeprazole (PriLOSEC) 20 mg delayed release capsule TAKE 1 CAPSULE BY MOUTH EVERY DAY 90 capsule 1    [DISCONTINUED] benzonatate (TESSALON) 200 MG capsule Take 1 capsule (200 mg total) by mouth 3 (three) times a day as needed for cough (Patient not taking: Reported on 7/28/2024) 20 capsule 0     No current facility-administered medications on file prior to visit.         Objective   /70 (BP Location: Left arm, Patient Position: Sitting, Cuff Size: Standard)   Pulse 66   Ht 5' 3\" (1.6 m)   Wt 73.8 kg (162 lb 9.6 oz)   SpO2 96%   BMI 28.80 kg/m²      Physical Exam  Vitals and nursing note reviewed.   Constitutional:       General: She is not in acute distress.     Appearance: She is well-developed.   HENT:      Head: Normocephalic and atraumatic.   Eyes:      Conjunctiva/sclera: Conjunctivae normal.   Cardiovascular:      Rate and Rhythm: Normal rate and regular rhythm.      Heart sounds: No murmur heard.  Pulmonary:      Effort: Pulmonary effort is normal. No respiratory distress.      Breath sounds: Normal breath sounds.   Abdominal:      Palpations: Abdomen is soft.      Tenderness: There is no abdominal tenderness.   Musculoskeletal:         General: No swelling.      Cervical back: Neck supple.   Skin:     General: Skin is warm and dry.     "  Capillary Refill: Capillary refill takes less than 2 seconds.   Neurological:      Mental Status: She is alert.   Psychiatric:         Mood and Affect: Mood normal.       Administrative Statements   I have spent a total time of 20 minutes in caring for this patient on the day of the visit/encounter including Counseling / Coordination of care, Documenting in the medical record, Reviewing / ordering tests, medicine, procedures  , and Obtaining or reviewing history  . Topics discussed with the patient / family include symptom assessment and management and medication review.

## 2024-12-02 NOTE — ASSESSMENT & PLAN NOTE
Discussed that she really should start on citalopram daily as a controller medication.  She feels she is doing okay currently with BuSpar and Xanax.  Advised to use Xanax varyingly as needed.

## 2024-12-06 ENCOUNTER — TELEMEDICINE (OUTPATIENT)
Dept: BEHAVIORAL/MENTAL HEALTH CLINIC | Facility: CLINIC | Age: 64
End: 2024-12-06
Payer: COMMERCIAL

## 2024-12-06 DIAGNOSIS — F41.9 ANXIETY: Primary | ICD-10-CM

## 2024-12-06 PROCEDURE — 90834 PSYTX W PT 45 MINUTES: CPT | Performed by: SOCIAL WORKER

## 2024-12-09 NOTE — PSYCH
Behavioral Health Psychotherapy Progress Note    Psychotherapy Provided: Individual Psychotherapy     1. Anxiety                Behavioral Health Treatment Plan and Discharge Planning: Yesi Link is aware of and agrees to continue to work on their treatment plan. They have identified and are working toward their discharge goals. yes    Visit start and stop times:    12/06/24  Start Time: 1500  Stop Time: 1540  Total Visit Time: 40 minutes  Virtual Regular Visit  Therapist met w/pt for individual session.  Pt shared that she has had a few anxious moments especially from Thanksgiving.  She stated that she usually gets calls from her husbands kids and didn't receive any which has raised some suspicion for pt.  Pt stated that she has been ruminating on it and knows that it something out of her control.  therapist and pt discussed different strategies to help her get out of the ruminating thoughts.  pt stated she also hasn't been able to go to her aqua therapy due to being sick and knows that going there also helps her overall emotional state.      Verification of patient location:    Patient is located at Home in the following state in which I hold an active license [place]      Assessment/Plan: f/u in one month    Problem List Items Addressed This Visit       Anxiety - Primary              Reason for visit is No chief complaint on file.       Encounter provider Naty Dickerson      Recent Visits  Date Type Provider Dept   12/06/24 Telemedicine Naty Dickerson [facility] [address] [facility]   12/02/24 Office Visit MELECIO Durant [facility] [address] [place] [address]   Showing recent visits within past 7 days and meeting all other requirements  Future Appointments  No visits were found meeting these conditions.  Showing future appointments within next 150 days and meeting all other requirements       The patient was identified by name and date of birth. Yesi Link was informed that this is a telemedicine  visit and that the visit is being conducted throughthe Epic Embedded platform. She agrees to proceed..  My office door was closed. No one else was in the room.  She acknowledged consent and understanding of privacy and security of the video platform. The patient has agreed to participate and understands they can discontinue the visit at any time.    Patient is aware this is a billable service.     Sundar Link is a 64 y.o. female  .      HPI     Past Medical History:   Diagnosis Date    Depression with anxiety 11/8/2018       No past surgical history on file.    Current Outpatient Medications   Medication Sig Dispense Refill    ALPRAZolam (XANAX) 0.5 mg tablet Take 1 tablet (0.5 mg total) by mouth 2 (two) times a day as needed for anxiety 30 tablet 0    amLODIPine (NORVASC) 5 mg tablet TAKE 1 TABLET (5 MG TOTAL) BY MOUTH DAILY. 90 tablet 1    busPIRone (BUSPAR) 15 mg tablet Take 1 tablet (15 mg total) by mouth 3 (three) times a day 270 tablet 2    cholecalciferol (VITAMIN D3) 1,000 units tablet Take 2 tablets (2,000 Units total) by mouth daily 60 tablet 3    clobetasol (TEMOVATE) 0.05 % external solution Apply topically 2 (two) times a day 50 mL 0    omeprazole (PriLOSEC) 20 mg delayed release capsule TAKE 1 CAPSULE BY MOUTH EVERY DAY 90 capsule 1     No current facility-administered medications for this visit.        Allergies   Allergen Reactions    Meloxicam Throat Swelling and Anaphylaxis    Metaxalone Throat Swelling and Anaphylaxis       Review of Systems    Video Exam    There were no vitals filed for this visit.    Physical Exam Pt denied any SI/HI/Ah/VH

## 2024-12-14 DIAGNOSIS — K21.9 GASTROESOPHAGEAL REFLUX DISEASE WITHOUT ESOPHAGITIS: ICD-10-CM

## 2024-12-14 DIAGNOSIS — I10 BENIGN HYPERTENSION: ICD-10-CM

## 2024-12-14 RX ORDER — AMLODIPINE BESYLATE 5 MG/1
5 TABLET ORAL DAILY
Qty: 30 TABLET | Refills: 5 | Status: SHIPPED | OUTPATIENT
Start: 2024-12-14

## 2024-12-20 DIAGNOSIS — F41.9 ANXIETY: ICD-10-CM

## 2024-12-20 NOTE — TELEPHONE ENCOUNTER
Reason for call:   [x] Refill   [] Prior Auth  [] Other:     Office:   [x] PCP/Provider - [facility] [address] [facility]    Medication: alprazolam    Dose/Frequency: 0.25mg bid prn    Quantity: 30    Pharmacy: [facility]/pharmacy #[id number] - [facility] DIANNA CHRISTOPHER - [address]     Does the patient have enough for 3 days?   [] Yes   [x] No - Send as HP to POD

## 2024-12-23 RX ORDER — ALPRAZOLAM 0.5 MG
0.5 TABLET ORAL 2 TIMES DAILY PRN
Qty: 30 TABLET | Refills: 0 | Status: SHIPPED | OUTPATIENT
Start: 2024-12-23

## 2025-01-17 ENCOUNTER — TELEPHONE (OUTPATIENT)
Age: 65
End: 2025-01-17

## 2025-01-17 NOTE — TELEPHONE ENCOUNTER
Pt called in stating she forgot she had an appt with Nicolasa today. Pt apologizes and wishes to r/s her appt.     Please advise & call pt back to r/s her appt. Thank you!    Yesi: [phone]

## 2025-01-27 DIAGNOSIS — F41.9 ANXIETY: ICD-10-CM

## 2025-01-27 RX ORDER — BUSPIRONE HYDROCHLORIDE 15 MG/1
15 TABLET ORAL 3 TIMES DAILY
Qty: 270 TABLET | Refills: 0 | Status: SHIPPED | OUTPATIENT
Start: 2025-01-27

## 2025-01-27 NOTE — TELEPHONE ENCOUNTER
Reason for call:   [x] Refill   [] Prior Auth  [] Other:     Office:   [x] PCP/Provider - MELECIO Durant   [] Specialty/Provider -     Medication: (BUSPAR) 15 mg / 1 tab daily / 270 tabs                      ALPRAZolam 0.5 mg / 1 tab daily / 30 tabs        Pharmacy: [facility]/pharmacy #[id number] - [facility] [place] - [address]          Does the patient have enough for 3 days?   [x] Yes   [] No - Send as HP to POD

## 2025-01-28 RX ORDER — ALPRAZOLAM 0.5 MG
0.5 TABLET ORAL 2 TIMES DAILY PRN
Qty: 30 TABLET | Refills: 0 | Status: SHIPPED | OUTPATIENT
Start: 2025-01-28

## 2025-01-31 ENCOUNTER — TELEMEDICINE (OUTPATIENT)
Dept: BEHAVIORAL/MENTAL HEALTH CLINIC | Facility: CLINIC | Age: 65
End: 2025-01-31
Payer: COMMERCIAL

## 2025-01-31 DIAGNOSIS — F41.9 ANXIETY: Primary | ICD-10-CM

## 2025-01-31 DIAGNOSIS — F33.1 MODERATE EPISODE OF RECURRENT MAJOR DEPRESSIVE DISORDER (HCC): ICD-10-CM

## 2025-01-31 PROCEDURE — 90834 PSYTX W PT 45 MINUTES: CPT | Performed by: SOCIAL WORKER

## 2025-02-02 NOTE — PSYCH
Behavioral Health Psychotherapy Progress Note    Psychotherapy Provided: Individual Psychotherapy     1. Anxiety        2. Moderate episode of recurrent major depressive disorder (HCC)              Behavioral Health Treatment Plan and Discharge Planning: Yesi Link is aware of and agrees to continue to work on their treatment plan. They have identified and are working toward their discharge goals. yes    Depression Follow-up Plan Completed: Not applicable    Visit start and stop times:    25  Start Time: 1500  Stop Time: 1540  Total Visit Time: 40 minutes  Virtual Regular Visit  Therapist met w/pt for individual session.  Symptoms include: racing thoughts, excessive worry, fatigue.  Pt shared that she heard from her  husbands family on her birthday which has prompted some anxiety for pt.  Pt stated that she made plans w/them in less than a month to go sledding.  She stated that she is looking forward to it but also worried because it has been a while since she has saw them.  Therapist and pt discussed pt's worries and therapist helped pt work on reframing and challenging her anxious thoughts rather than staying stuck in them.  Pt stated that she has felt more anxiety in general and knows that physical activity does help but due to the cold weather she has been less active.    Verification of patient location:    Patient is located at Home in the following state in which I hold an active license [place]      Assessment/Plan:  f/u in one month    Problem List Items Addressed This Visit       Anxiety - Primary    Moderate episode of recurrent major depressive disorder (HCC)       Reason for visit is No chief complaint on file.       Encounter provider Naty Dickerson      Recent Visits  Date Type Provider Dept   25 Telemedicine Naty Dickerson [facility] [address]   Showing recent visits within past 7 days and meeting all other requirements  Future Appointments  No visits were found  meeting these conditions.  Showing future appointments within next 150 days and meeting all other requirements       The patient was identified by name and date of birth. Yesi Link was informed that this is a telemedicine visit and that the visit is being conducted throughthe Epic Embedded platform. She agrees to proceed..  My office door was closed. No one else was in the room.  She acknowledged consent and understanding of privacy and security of the video platform. The patient has agreed to participate and understands they can discontinue the visit at any time.    Patient is aware this is a billable service.     Subjective  Yesi Link is a 65 y.o. female  .      HPI     Past Medical History:   Diagnosis Date    Depression with anxiety 11/8/2018       No past surgical history on file.    Current Outpatient Medications   Medication Sig Dispense Refill    busPIRone (BUSPAR) 15 mg tablet Take 1 tablet (15 mg total) by mouth 3 (three) times a day 270 tablet 0    ALPRAZolam (XANAX) 0.5 mg tablet Take 1 tablet (0.5 mg total) by mouth 2 (two) times a day as needed for anxiety 30 tablet 0    amLODIPine (NORVASC) 5 mg tablet TAKE 1 TABLET (5 MG TOTAL) BY MOUTH DAILY. 30 tablet 5    cholecalciferol (VITAMIN D3) 1,000 units tablet Take 2 tablets (2,000 Units total) by mouth daily 60 tablet 3    clobetasol (TEMOVATE) 0.05 % external solution Apply topically 2 (two) times a day 50 mL 0    omeprazole (PriLOSEC) 20 mg delayed release capsule TAKE 1 CAPSULE BY MOUTH EVERY DAY 30 capsule 5     No current facility-administered medications for this visit.        Allergies   Allergen Reactions    Meloxicam Throat Swelling and Anaphylaxis    Metaxalone Throat Swelling and Anaphylaxis       Review of Systems    Video Exam    There were no vitals filed for this visit.    Physical Exam Pt denied any SI/HI/Ah/VH

## 2025-02-21 DIAGNOSIS — F41.9 ANXIETY: ICD-10-CM

## 2025-02-21 RX ORDER — ALPRAZOLAM 0.5 MG
0.5 TABLET ORAL 2 TIMES DAILY PRN
Qty: 30 TABLET | Refills: 0 | Status: SHIPPED | OUTPATIENT
Start: 2025-02-21

## 2025-02-21 NOTE — TELEPHONE ENCOUNTER
Pt called requested med refill:    ALPRAZolam (XANAX) 0.5 mg tablet     Pharmacy confirmed.    Please advise, thank you.

## 2025-03-07 ENCOUNTER — TELEMEDICINE (OUTPATIENT)
Dept: BEHAVIORAL/MENTAL HEALTH CLINIC | Facility: CLINIC | Age: 65
End: 2025-03-07
Payer: COMMERCIAL

## 2025-03-07 DIAGNOSIS — F41.9 ANXIETY: Primary | ICD-10-CM

## 2025-03-07 PROCEDURE — 90832 PSYTX W PT 30 MINUTES: CPT | Performed by: SOCIAL WORKER

## 2025-03-13 NOTE — BH TREATMENT PLAN
"Outpatient Behavioral Health Psychotherapy Treatment Plan    Yesi A Nikolay  1960     Date of Initial Psychotherapy Assessment: 3/7/25   Date of Current Treatment Plan: 03/7/25  Treatment Plan Target Date: 9/7/25  Treatment Plan Expiration Date: TBD    Diagnosis:   1. Anxiety            Area(s) of Need: lack of insight, lack of coping skills    Long Term Goal 1 (in the client's own words): \"I want to be able to sleep better.\"    Stage of Change: Contemplation    Target Date for completion: TBD     Anticipated therapeutic modalities: CBT     People identified to complete this goal: pt      Objective 1: (identify the means of measuring success in meeting the objective): Pt will read handout on Grounding by Bonnie Montenegro and practice 1 grounding skill per week to help manage overall anxiety levels      Objective 2: (identify the means of measuring success in meeting the objective): Pt will practice 2 sleeping meditations per week to determine what helps and what doesn't help with her sleep.          I am currently under the care of a [facility] psychiatric provider: no    My [facility] psychiatric provider is: PCP    I am currently taking psychiatric medications: Yes, as prescribed    I feel that I will be ready for discharge from mental health care when I reach the following (measurable goal/objective): \"When I am able to manage my anxiety when it comes up.\"    For children and adults who have a legal guardian:   Has there been any change to custody orders and/or guardianship status? NA. If yes, attach updated documentation.    I have updated my Crisis Plan and have been offered a copy of this plan    Behavioral Health Treatment Plan [facility]: Diagnosis and Treatment Plan explained to Yesi Link acknowledges an understanding of their diagnosis. Yesi Link agrees to this treatment plan.    I have been offered a copy of this Treatment Plan. yes        "

## 2025-03-13 NOTE — PSYCH
"Behavioral Health Psychotherapy Progress Note    Psychotherapy Provided: Individual Psychotherapy     1. Anxiety          DATA: Therapist met w/pt for individual session.  She stated she wasn't able to meet up with her family due to the weather a few weeks ago. She expressed disappointment but also shared that they are working on making new plans so they can all get together.  Pt stated that she continues to struggle w/sleeping but knows that when she returns back to water aerobics it will improve slightly.  Therapist and pt worked on tx plan goals and main goal is to work on finding healthy strategies to implement for sleep.  Pt also stated that she wants to continue to work on different strategies for when she does feel more anxious.    During this session, this clinician used the following therapeutic modalities: Cognitive Behavioral Therapy and Solution-Focused Therapy    Substance Abuse was not addressed during this session. If the client is diagnosed with a co-occurring substance use disorder, please indicate any changes in the frequency or amount of use: n/a. Stage of change for addressing substance use diagnoses: No substance use/Not applicable    ASSESSMENT:  Yesi Link presents with a Euthymic/ normal and Anxious mood.     her affect is Normal range and intensity, which is congruent, with her mood and the content of the session. The client has made progress on their goals.     Yesi Link presents with a none risk of suicide, none risk of self-harm, and none risk of harm to others.    For any risk assessment that surpasses a \"low\" rating, a safety plan must be developed.    A safety plan was indicated: no  If yes, describe in detail n/a    PLAN: Between sessions, Yesi Link will practice strategies discussed during session today to help with sleep. At the next session, the therapist will use Cognitive Behavioral Therapy to address symptoms of anxiety.    Behavioral Health Treatment Plan and " Discharge Planning: Yesi Link is aware of and agrees to continue to work on their treatment plan. They have identified and are working toward their discharge goals. yes    Depression Follow-up Plan Completed: Not applicable    Visit start and stop times:    03/7/25  Start Time: 1400  Stop Time: 1435  Total Visit Time: 35 minutes  Virtual Regular Visit    Verification of patient location:    Patient is located at Home in the following state in which I hold an active license [place]      Assessment/Plan:    Problem List Items Addressed This Visit       Anxiety - Primary         Reason for visit is No chief complaint on file.       Encounter provider Naty Dickerson      Recent Visits  Date Type Provider Dept   03/07/25 Telemedicine Naty Dickerson [facility] [address]   Showing recent visits within past 7 days and meeting all other requirements  Future Appointments  No visits were found meeting these conditions.  Showing future appointments within next 150 days and meeting all other requirements       The patient was identified by name and date of birth. Yesi Link was informed that this is a telemedicine visit and that the visit is being conducted throughthe Epic Embedded platform. She agrees to proceed..  My office door was closed. No one else was in the room.  She acknowledged consent and understanding of privacy and security of the video platform. The patient has agreed to participate and understands they can discontinue the visit at any time.    Patient is aware this is a billable service.     Subjective  Yesi Link is a 65 y.o. female  .      HPI     Past Medical History:   Diagnosis Date    Depression with anxiety 11/8/2018       No past surgical history on file.    Current Outpatient Medications   Medication Sig Dispense Refill    busPIRone (BUSPAR) 15 mg tablet Take 1 tablet (15 mg total) by mouth 3 (three) times a day 270 tablet 0    ALPRAZolam (XANAX) 0.5 mg tablet Take 1 tablet  (0.5 mg total) by mouth 2 (two) times a day as needed for anxiety 30 tablet 0    amLODIPine (NORVASC) 5 mg tablet TAKE 1 TABLET (5 MG TOTAL) BY MOUTH DAILY. 30 tablet 5    cholecalciferol (VITAMIN D3) 1,000 units tablet Take 2 tablets (2,000 Units total) by mouth daily 60 tablet 3    clobetasol (TEMOVATE) 0.05 % external solution Apply topically 2 (two) times a day 50 mL 0    omeprazole (PriLOSEC) 20 mg delayed release capsule TAKE 1 CAPSULE BY MOUTH EVERY DAY 30 capsule 5     No current facility-administered medications for this visit.        Allergies   Allergen Reactions    Meloxicam Throat Swelling and Anaphylaxis    Metaxalone Throat Swelling and Anaphylaxis       Review of Systems    Video Exam    There were no vitals filed for this visit.    Physical Exam Pt denied any SI/Hi/Ah/VH

## 2025-03-28 DIAGNOSIS — F41.9 ANXIETY: ICD-10-CM

## 2025-03-28 NOTE — TELEPHONE ENCOUNTER
Medication: ALPRAZolam (XANAX) 0.5 mg tablet     Dose/Frequency: Take 1 tablet (0.5 mg total) by mouth 2 (two) times a day as needed for anxiety     Quantity:  30 tablet     Pharmacy: [facility]/pharmacy #[id number] - [facility] DIANNA CHRISTOPHER - [address]     Office:   [x] PCP/Provider -   [] Speciality/Provider -     Does the patient have enough for 3 days?   [x] Yes   [] No - Send as HP to POD

## 2025-03-31 RX ORDER — ALPRAZOLAM 0.5 MG
0.5 TABLET ORAL 2 TIMES DAILY PRN
Qty: 30 TABLET | Refills: 0 | Status: SHIPPED | OUTPATIENT
Start: 2025-03-31

## 2025-04-04 ENCOUNTER — TELEPHONE (OUTPATIENT)
Age: 65
End: 2025-04-04

## 2025-04-04 NOTE — TELEPHONE ENCOUNTER
Patient is calling regarding cancelling an appointment.    Date/Time: 4/4/2025 3pm    Reason:     Patient was rescheduled: YES [] NO [x]  If yes, when was Patient reschedule for: patient stated she will call back to reschedule    Patient requesting call back to reschedule: YES [] NO [x]

## 2025-04-07 ENCOUNTER — OFFICE VISIT (OUTPATIENT)
Dept: FAMILY MEDICINE CLINIC | Facility: CLINIC | Age: 65
End: 2025-04-07
Payer: COMMERCIAL

## 2025-04-07 VITALS
OXYGEN SATURATION: 98 % | HEIGHT: 63 IN | DIASTOLIC BLOOD PRESSURE: 70 MMHG | BODY MASS INDEX: 29.45 KG/M2 | HEART RATE: 69 BPM | SYSTOLIC BLOOD PRESSURE: 134 MMHG | WEIGHT: 166.2 LBS

## 2025-04-07 DIAGNOSIS — Z00.00 ANNUAL PHYSICAL EXAM: Primary | ICD-10-CM

## 2025-04-07 DIAGNOSIS — F41.9 ANXIETY: ICD-10-CM

## 2025-04-07 DIAGNOSIS — I10 BENIGN HYPERTENSION: ICD-10-CM

## 2025-04-07 DIAGNOSIS — Z12.11 SCREEN FOR COLON CANCER: ICD-10-CM

## 2025-04-07 DIAGNOSIS — F33.1 MODERATE EPISODE OF RECURRENT MAJOR DEPRESSIVE DISORDER (HCC): ICD-10-CM

## 2025-04-07 PROCEDURE — 99396 PREV VISIT EST AGE 40-64: CPT | Performed by: NURSE PRACTITIONER

## 2025-04-07 PROCEDURE — 99214 OFFICE O/P EST MOD 30 MIN: CPT | Performed by: NURSE PRACTITIONER

## 2025-04-07 NOTE — PROGRESS NOTES
Adult Annual Physical  Name: Yesi Link      : 1960      MRN: [id number]  Encounter Provider: MELECIO Durant  Encounter Date: 2025   Encounter department: [facility] [address]    :  Assessment & Plan  Screen for colon cancer    Orders:    Ambulatory Referral to Gastroenterology; Future    Benign hypertension  Blood pressure is well-managed with amlodipine daily.       Moderate episode of recurrent major depressive disorder (HCC)  Continue to see therapy.  Patient declines medication.         Anxiety  Continues with BuSpar as needed and Xanax as needed.  Continue with therapy.  Feels she is doing okay.       Annual physical exam             Preventive Screenings:    - Breast cancer screening: screening up-to-date     Immunizations:  - Immunizations due: Influenza, Prevnar 20, Tdap and Zoster (Shingrix)         History of Present Illness     Adult Annual Physical:  Patient presents for annual physical. Patient presents for annual physical. .     Diet and Physical Activity:  - Diet/Nutrition: no special diet.  - Exercise: no formal exercise.    General Health:  - Sleep: sleeps poorly.  - Hearing: normal hearing bilateral ears.  - Vision: wears glasses.    Review of Systems   Constitutional:  Negative for chills and fever.   HENT:  Negative for ear pain and sore throat.    Eyes:  Negative for pain and visual disturbance.   Respiratory:  Negative for cough and shortness of breath.    Cardiovascular:  Negative for chest pain and palpitations.   Gastrointestinal:  Negative for abdominal pain and vomiting.   Genitourinary:  Negative for dysuria and hematuria.   Musculoskeletal:  Positive for arthralgias. Negative for back pain.   Skin:  Negative for color change and rash.   Neurological:  Negative for seizures and syncope.   Psychiatric/Behavioral:  Positive for sleep disturbance. The patient is nervous/anxious.    All other systems reviewed and are  "negative.    Current Outpatient Medications on File Prior to Visit   Medication Sig Dispense Refill    ALPRAZolam (XANAX) 0.5 mg tablet Take 1 tablet (0.5 mg total) by mouth 2 (two) times a day as needed for anxiety 30 tablet 0    amLODIPine (NORVASC) 5 mg tablet TAKE 1 TABLET (5 MG TOTAL) BY MOUTH DAILY. 30 tablet 5    busPIRone (BUSPAR) 15 mg tablet Take 1 tablet (15 mg total) by mouth 3 (three) times a day 270 tablet 0    cholecalciferol (VITAMIN D3) 1,000 units tablet Take 2 tablets (2,000 Units total) by mouth daily 60 tablet 3    omeprazole (PriLOSEC) 20 mg delayed release capsule TAKE 1 CAPSULE BY MOUTH EVERY DAY 30 capsule 5    [DISCONTINUED] clobetasol (TEMOVATE) 0.05 % external solution Apply topically 2 (two) times a day 50 mL 0     No current facility-administered medications on file prior to visit.        Objective   /70 (BP Location: Left arm, Patient Position: Sitting, Cuff Size: Standard)   Pulse 69   Ht 5' 3\" (1.6 m)   Wt 75.4 kg (166 lb 3.2 oz)   SpO2 98%   BMI 29.44 kg/m²     Physical Exam  Vitals and nursing note reviewed.   Constitutional:       General: She is not in acute distress.     Appearance: She is well-developed.   HENT:      Head: Normocephalic and atraumatic.      Right Ear: Tympanic membrane normal.      Left Ear: Tympanic membrane normal.   Eyes:      Conjunctiva/sclera: Conjunctivae normal.   Cardiovascular:      Rate and Rhythm: Normal rate and regular rhythm.      Heart sounds: No murmur heard.  Pulmonary:      Effort: Pulmonary effort is normal. No respiratory distress.      Breath sounds: Normal breath sounds.   Abdominal:      Palpations: Abdomen is soft.      Tenderness: There is no abdominal tenderness.   Musculoskeletal:         General: No swelling.      Cervical back: Neck supple.   Skin:     General: Skin is warm and dry.      Capillary Refill: Capillary refill takes less than 2 seconds.   Neurological:      Mental Status: She is alert and oriented to person, " place, and time.   Psychiatric:         Mood and Affect: Mood normal.

## 2025-04-07 NOTE — ASSESSMENT & PLAN NOTE
Continues with BuSpar as needed and Xanax as needed.  Continue with therapy.  Feels she is doing okay.

## 2025-04-07 NOTE — ASSESSMENT & PLAN NOTE
Blood pressure is well-managed with amlodipine daily.        HISTORY OF PRESENT ILLNESS:   Mylene is a 33 year old       who presents today for postpartum examination. She delivered a baby girl by  section on 2020.  She reports no issues.  Postpartum bleeding stopped within the last week.  It has been light.  She has no residual pain.  She is breast feeding her baby and reports no breast concerns.  Mood has been good, and she reports no signs or symptoms of postpartum depression, she is coping well.  She has not resumed sexual activity, and plans to use withdrawal method for contraception.  Kegel exercises were discussed and recommended.  Last Pap smear was performed 2018 and was NIL, HPV negative.      I have reviewed the patient's medications and allergies, past medical, surgical, social and family history, updating these as appropriate.  See Histories section of the electronic medical record for a display of this information.    EXAM:  Visit Vitals  /78   Pulse 88   Ht 5' 4\" (1.626 m)   Wt 72 kg   LMP 2019   BMI 27.25 kg/m²     General: Well-developed and well-nourished, appropriately groomed.  HEENT: Normocephalic. Nonicteric sclerae. Pink and moist mucous membranes. Normal dentition.  Neck: Supple, no lymphadenopathy or thyromegaly noted.  Heart: Regular rate and rhythm.  Lungs: Clear to auscultation bilaterally.  Breasts: Exam deferred.  Abdomen: Soft, nontender, nondistended. No hepatosplenomegaly or other organomegaly noted. Uterus involuted.   Incision:  Healing well.  Extremities: No edema or pain to palpation.  Neurologic: Cranial nerves II through XII grossly intact.  Skin: No obvious lesions or rashes.  Psychiatric: Alert and oriented to person, place, and time. Appropriate affect and mood.  Pelvic:  Deferred    Recent PHQ 2/9 Score    PHQ 2:  Date Adult PHQ 2 Score   3/4/2020 0       PHQ 9:  Date Adult PHQ 9 Score   2018 1       ASSESSMENT & PLAN:  33 year old       female for postpartum exam following   delivery.  The patient has done well in the postpartum period. She will be using withdrawal method for contraception. She will continue to follow up on an as-needed basis.    We briefly discussed trial of labor after .  We discussed that it is reasonable to attempt.  Given that she had her  for nonreassuring fetal tones.  Recommend waiting at least a year before getting pregnant again.

## 2025-04-27 DIAGNOSIS — F41.9 ANXIETY: ICD-10-CM

## 2025-04-27 RX ORDER — BUSPIRONE HYDROCHLORIDE 15 MG/1
15 TABLET ORAL 3 TIMES DAILY
Qty: 90 TABLET | Refills: 1 | Status: SHIPPED | OUTPATIENT
Start: 2025-04-27

## 2025-05-19 DIAGNOSIS — I10 BENIGN HYPERTENSION: ICD-10-CM

## 2025-05-19 DIAGNOSIS — F41.9 ANXIETY: ICD-10-CM

## 2025-05-19 NOTE — TELEPHONE ENCOUNTER
Medication: ALPRAZolam (XANAX) 0.5 mg table     Dose/Frequency: Take 1 tablet (0.5 mg total) by mouth 2 (two) times a day as needed for anxiety,     Quantity: 30    Pharmacy: [facility]/pharmacy #[id number] [place]     Office:   [x] PCP/Provider -   [] Speciality/Provider -     Does the patient have enough for 3 days?   [] Yes   [] No - Send as HP to POD    Medication: amLODIPine (NORVASC) 5 mg tablet     Dose/Frequency: TAKE 1 TABLET (5 MG TOTAL) BY MOUTH DAILY.,     Quantity: 30    Pharmacy: [facility]/pharmacy #[id number] [facility] [address]RISTOPHER - [id number]     Office:   [x] PCP/Provider -   [] Speciality/Provider -     Does the patient have enough for 3 days?   [] Yes   [] No - Send as HP to POD

## 2025-05-20 RX ORDER — ALPRAZOLAM 0.5 MG
0.5 TABLET ORAL 2 TIMES DAILY PRN
Qty: 30 TABLET | Refills: 0 | Status: SHIPPED | OUTPATIENT
Start: 2025-05-20

## 2025-05-20 RX ORDER — AMLODIPINE BESYLATE 5 MG/1
5 TABLET ORAL DAILY
Qty: 30 TABLET | Refills: 5 | Status: SHIPPED | OUTPATIENT
Start: 2025-05-20

## 2025-06-03 ENCOUNTER — LAB (OUTPATIENT)
Age: 65
End: 2025-06-03
Attending: NURSE PRACTITIONER
Payer: COMMERCIAL

## 2025-06-03 ENCOUNTER — OFFICE VISIT (OUTPATIENT)
Age: 65
End: 2025-06-03
Payer: COMMERCIAL

## 2025-06-03 VITALS
RESPIRATION RATE: 18 BRPM | TEMPERATURE: 97.4 F | HEART RATE: 82 BPM | DIASTOLIC BLOOD PRESSURE: 66 MMHG | OXYGEN SATURATION: 98 % | SYSTOLIC BLOOD PRESSURE: 113 MMHG | BODY MASS INDEX: 28.66 KG/M2 | WEIGHT: 161.8 LBS

## 2025-06-03 DIAGNOSIS — Z00.00 HEALTHCARE MAINTENANCE: ICD-10-CM

## 2025-06-03 DIAGNOSIS — R14.0 ABDOMINAL BLOATING: Primary | ICD-10-CM

## 2025-06-03 DIAGNOSIS — R73.01 IFG (IMPAIRED FASTING GLUCOSE): ICD-10-CM

## 2025-06-03 LAB
ALBUMIN SERPL BCG-MCNC: 4.3 G/DL (ref 3.5–5)
ALP SERPL-CCNC: 65 U/L (ref 34–104)
ALT SERPL W P-5'-P-CCNC: 18 U/L (ref 7–52)
ANION GAP SERPL CALCULATED.3IONS-SCNC: 11 MMOL/L (ref 4–13)
AST SERPL W P-5'-P-CCNC: 26 U/L (ref 13–39)
BASOPHILS # BLD AUTO: 0.02 THOUSANDS/ÂΜL (ref 0–0.1)
BASOPHILS NFR BLD AUTO: 1 % (ref 0–1)
BILIRUB SERPL-MCNC: 0.5 MG/DL (ref 0.2–1)
BUN SERPL-MCNC: 16 MG/DL (ref 5–25)
CALCIUM SERPL-MCNC: 9.2 MG/DL (ref 8.4–10.2)
CHLORIDE SERPL-SCNC: 100 MMOL/L (ref 96–108)
CHOLEST SERPL-MCNC: 221 MG/DL (ref ?–200)
CO2 SERPL-SCNC: 27 MMOL/L (ref 21–32)
CREAT SERPL-MCNC: 1.22 MG/DL (ref 0.6–1.3)
EOSINOPHIL # BLD AUTO: 0 THOUSAND/ÂΜL (ref 0–0.61)
EOSINOPHIL NFR BLD AUTO: 0 % (ref 0–6)
ERYTHROCYTE [DISTWIDTH] IN BLOOD BY AUTOMATED COUNT: 11.9 % (ref 11.6–15.1)
EST. AVERAGE GLUCOSE BLD GHB EST-MCNC: 114 MG/DL
GFR SERPL CREATININE-BSD FRML MDRD: 47 ML/MIN/1.73SQ M
GLUCOSE SERPL-MCNC: 103 MG/DL (ref 65–140)
HBA1C MFR BLD: 5.6 %
HCT VFR BLD AUTO: 41.9 % (ref 34.8–46.1)
HDLC SERPL-MCNC: 62 MG/DL
HGB BLD-MCNC: 13.4 G/DL (ref 11.5–15.4)
IMM GRANULOCYTES # BLD AUTO: 0.01 THOUSAND/UL (ref 0–0.2)
IMM GRANULOCYTES NFR BLD AUTO: 0 % (ref 0–2)
LDLC SERPL CALC-MCNC: 138 MG/DL (ref 0–100)
LYMPHOCYTES # BLD AUTO: 0.76 THOUSANDS/ÂΜL (ref 0.6–4.47)
LYMPHOCYTES NFR BLD AUTO: 21 % (ref 14–44)
MCH RBC QN AUTO: 29.3 PG (ref 26.8–34.3)
MCHC RBC AUTO-ENTMCNC: 32 G/DL (ref 31.4–37.4)
MCV RBC AUTO: 92 FL (ref 82–98)
MONOCYTES # BLD AUTO: 0.41 THOUSAND/ÂΜL (ref 0.17–1.22)
MONOCYTES NFR BLD AUTO: 11 % (ref 4–12)
NEUTROPHILS # BLD AUTO: 2.42 THOUSANDS/ÂΜL (ref 1.85–7.62)
NEUTS SEG NFR BLD AUTO: 67 % (ref 43–75)
NONHDLC SERPL-MCNC: 159 MG/DL
NRBC BLD AUTO-RTO: 0 /100 WBCS
PLATELET # BLD AUTO: 175 THOUSANDS/UL (ref 149–390)
PMV BLD AUTO: 9.6 FL (ref 8.9–12.7)
POTASSIUM SERPL-SCNC: 4.1 MMOL/L (ref 3.5–5.3)
PROT SERPL-MCNC: 7.5 G/DL (ref 6.4–8.4)
RBC # BLD AUTO: 4.58 MILLION/UL (ref 3.81–5.12)
SARS-COV-2 AG UPPER RESP QL IA: NEGATIVE
SODIUM SERPL-SCNC: 138 MMOL/L (ref 135–147)
TRIGL SERPL-MCNC: 107 MG/DL (ref ?–150)
TSH SERPL DL<=0.05 MIU/L-ACNC: 2.69 UIU/ML (ref 0.45–4.5)
VALID CONTROL: NORMAL
WBC # BLD AUTO: 3.62 THOUSAND/UL (ref 4.31–10.16)

## 2025-06-03 PROCEDURE — 93005 ELECTROCARDIOGRAM TRACING: CPT

## 2025-06-03 PROCEDURE — 84443 ASSAY THYROID STIM HORMONE: CPT

## 2025-06-03 PROCEDURE — 80061 LIPID PANEL: CPT

## 2025-06-03 PROCEDURE — 36415 COLL VENOUS BLD VENIPUNCTURE: CPT

## 2025-06-03 PROCEDURE — 87811 SARS-COV-2 COVID19 W/OPTIC: CPT | Performed by: PHYSICIAN ASSISTANT

## 2025-06-03 PROCEDURE — 83036 HEMOGLOBIN GLYCOSYLATED A1C: CPT

## 2025-06-03 PROCEDURE — 99213 OFFICE O/P EST LOW 20 MIN: CPT | Performed by: PHYSICIAN ASSISTANT

## 2025-06-03 PROCEDURE — 85025 COMPLETE CBC W/AUTO DIFF WBC: CPT

## 2025-06-03 PROCEDURE — 80053 COMPREHEN METABOLIC PANEL: CPT

## 2025-06-03 NOTE — PROGRESS NOTES
[facility]        NAME: Yesi Link is a 65 y.o. female  : 1960    MRN: [id number]  DATE: Gregoria 3, 2025  TIME: 9:26 AM    Assessment and Plan   Abdominal bloating [R14.0]  1. Abdominal bloating  Poct Covid 19 Rapid Antigen Test          Patient's abdominal bloating symptoms are unclear.  She has no history suggestive infectious gastroenteritis.  She reports all symptoms began after eating seafood this past Saturday night.  She did decline a nausea medicine.  She did request EKG and rapid COVID min which were normal.  Based on abdominal examination there might be a mild stool burden in the left lower quadrant but she is passing stool and flatus.  I explained to the patient at this time I cannot explain her symptoms based on history exam and her testing done here alone.  I recommended that she follow-up with her PCP for further workup    The patient and/or parent/legal guardian verbalized understanding of exam findings and   Treatment plan. We engaged in the shared decision-making process and treatment options were   discussed at length with the patient.  All questions, concerns and complaints were answered and   addressed to the patient's' and/or parent/legal guardians's satisfaction.    Patient Instructions   There are no Patient Instructions on file for this visit.    Follow up with PCP in 3-5 days.  Proceed to  ER if symptoms worsen.    If tests are performed, our office will contact you with results only if   changes need to made to the care plan discussed with you at the visit.   You can review your full results on [facility].     Chief Complaint     Chief Complaint   Patient presents with   • Nausea     Symptoms started 4 nights ago after going out to eat. C/o fatigue, loss appetite and headache.          History of Present Illness       HPI  Symptoms started 4 nights ago after going out to eat. C/o fatigue, loss appetite and headache. . Started after eating gloria gloria this past  Saturday evening at a restaurant. Reports the fish was cold when she ate it. No vomiting. Feels nauseous. Nausea has not been constant. Has taken advil and tylenol for headache. No diarrhea. Reports fever of 102 yesterday but temp taken with heat all the way up on the forehead. Also having chills. Home covid test was negative. No abdominal pain. Just feels bloated. Reports dry throat and cough. No  symptoms reported.    Review of Systems   Review of Systems  All other related systems reviewed with patient or accompanying historian and are negative except as noted in HPI    Current Medications     Current Medications[1]    Current Allergies     Allergies as of 06/03/2025 - Reviewed 06/03/2025   Allergen Reaction Noted   • Meloxicam Throat Swelling and Anaphylaxis 03/10/2020   • Metaxalone Throat Swelling and Anaphylaxis 03/10/2020            The following portions of the patient's history were reviewed and updated as appropriate: allergies, current medications, past family history, past medical history, past social history, past surgical history and problem list.     Past Medical History[2]    Past Surgical History[3]    Family History[4]      Medications have been verified.        Objective   /66   Pulse 82   Temp (!) 97.4 °F (36.3 °C)   Resp 18   Wt 73.4 kg (161 lb 12.8 oz)   SpO2 98%   BMI 28.66 kg/m²   No LMP recorded. Patient is postmenopausal.       Physical Exam     Physical Exam  Constitutional:       General: She is not in acute distress.     Appearance: She is well-developed. She is not diaphoretic.   HENT:      Head: Normocephalic and atraumatic.     Eyes:      General: No scleral icterus.     Conjunctiva/sclera: Conjunctivae normal.     Neck:      Trachea: No tracheal deviation.     Cardiovascular:      Rate and Rhythm: Normal rate and regular rhythm.      Heart sounds: Normal heart sounds. No murmur heard.  Pulmonary:      Effort: Pulmonary effort is normal. No respiratory distress.       "Breath sounds: Normal breath sounds. No stridor. No wheezing, rhonchi or rales.   Abdominal:      General: Abdomen is flat. There is distension (minimally).      Palpations: Abdomen is soft.      Tenderness: There is no abdominal tenderness. There is no guarding or rebound.      Comments: Dullness to percussion LLQ tympanic in other quadrants       Musculoskeletal:      Cervical back: Normal range of motion and neck supple.   Lymphadenopathy:      Cervical: No cervical adenopathy.     Skin:     General: Skin is warm and dry.      Findings: No erythema.     Neurological:      Mental Status: She is alert and oriented to person, place, and time.     Psychiatric:         Behavior: Behavior normal.         Ortho Exam        Procedures  No Procedures performed today        Note: Portions of this record may have been created with voice recognition software. Occasional wrong word or \"sound a like\" substitutions may have occurred due to the inherent limitations of voice recognition software. Please read the chart carefully and recognize, using context, where substitutions have occurred.*             [1]    Current Outpatient Medications:   •  ALPRAZolam (XANAX) 0.5 mg tablet, Take 1 tablet (0.5 mg total) by mouth 2 (two) times a day as needed for anxiety, Disp: 30 tablet, Rfl: 0  •  amLODIPine (NORVASC) 5 mg tablet, Take 1 tablet (5 mg total) by mouth daily, Disp: 30 tablet, Rfl: 5  •  busPIRone (BUSPAR) 15 mg tablet, TAKE 1 TABLET BY MOUTH 3 TIMES A DAY., Disp: 90 tablet, Rfl: 1  •  cholecalciferol (VITAMIN D3) 1,000 units tablet, Take 2 tablets (2,000 Units total) by mouth daily, Disp: 60 tablet, Rfl: 3  •  omeprazole (PriLOSEC) 20 mg delayed release capsule, TAKE 1 CAPSULE BY MOUTH EVERY DAY, Disp: 30 capsule, Rfl: 5[2]  Past Medical History:  Diagnosis Date   • Depression with anxiety 11/8/2018   [3]  No past surgical history on file.[4]  Family History  Problem Relation Name Age of Onset   • Heart attack Mother          " MI   • Heart attack Father          MI   • Migraines Sister     • No Known Problems Maternal Grandfather     • No Known Problems Paternal Grandfather     • Heart attack Brother          MI   • No Known Problems Maternal Aunt     • No Known Problems Maternal Aunt     • No Known Problems Maternal Aunt     • No Known Problems Maternal Aunt     • Breast cancer Neg Hx

## 2025-06-03 NOTE — ASSESSMENT & PLAN NOTE
Patient was advised to finish her pantoprazole now  She is hesitant to be on this medication another month  I have advised her she can take Pepcid 20 mg up to twice daily, but I am calling in 1 more month of pantoprazole if needed  s/p L renal stent , follows with Dr. Reddy  - Cont home ASA and plavix - Cont home crestor

## 2025-06-03 NOTE — LETTER
Gregoria 3, 2025     Patient: Yesi Link   YOB: 1960   Date of Visit: 6/3/2025       To Whom It May Concern:    It is my medical opinion that Yesi Link may return to work on 6/5/25.    If you have any questions or concerns, please don't hesitate to call.         Sincerely,        Brian Crowder PA-C    CC: No Recipients

## 2025-06-04 ENCOUNTER — OFFICE VISIT (OUTPATIENT)
Dept: FAMILY MEDICINE CLINIC | Facility: CLINIC | Age: 65
End: 2025-06-04
Payer: COMMERCIAL

## 2025-06-04 ENCOUNTER — TELEPHONE (OUTPATIENT)
Dept: OTHER | Facility: OTHER | Age: 65
End: 2025-06-04

## 2025-06-04 VITALS
OXYGEN SATURATION: 98 % | DIASTOLIC BLOOD PRESSURE: 78 MMHG | WEIGHT: 164 LBS | BODY MASS INDEX: 29.06 KG/M2 | HEIGHT: 63 IN | HEART RATE: 83 BPM | SYSTOLIC BLOOD PRESSURE: 126 MMHG

## 2025-06-04 DIAGNOSIS — G44.219 EPISODIC TENSION-TYPE HEADACHE, NOT INTRACTABLE: Primary | ICD-10-CM

## 2025-06-04 DIAGNOSIS — F41.9 ANXIETY: ICD-10-CM

## 2025-06-04 PROCEDURE — 99214 OFFICE O/P EST MOD 30 MIN: CPT | Performed by: FAMILY MEDICINE

## 2025-06-04 NOTE — TELEPHONE ENCOUNTER
Patient is calling reporting that she was seen at urgent care yesterday and she spoke to her PCP who told her to call the office first thing this morning to speak to Ele.  She is requesting Ele call her back as soon as possible, and if Ele is off today, she would like this message passed on to Leia Mendoza.

## 2025-06-04 NOTE — PROGRESS NOTES
Name: Yesi Link      : 1960      MRN: [id number]  Encounter Provider: MELECIO Conroy  Encounter Date: 2025   Encounter department: [facility] [address]  :  Assessment & Plan  Episodic tension-type headache, not intractable  Discussed findings with patient reviewed potential factors, stress related recommend continuation       Anxiety  Continue current medications              History of Present Illness   C/o heaache , and stomach upset   Afte reating out fish was cold   Treating with  xanax , may be the ha , related to stress , ready to leave work stressors   Xanax 2,5 mg bid prn   Stress   New puppy   Questions lyme   Stress, stress , stress     Headache  Nausea  Associated symptoms include headaches and nausea. Pertinent negatives include no abdominal pain, arthralgias, chest pain, chills, congestion, coughing, fever, joint swelling, myalgias, numbness, rash, sore throat, vomiting or weakness.     Review of Systems   Constitutional:  Negative for appetite change, chills, fever and unexpected weight change.   HENT:  Negative for congestion, dental problem, ear pain, hearing loss, postnasal drip, rhinorrhea, sinus pressure, sinus pain, sneezing, sore throat, tinnitus and voice change.    Eyes:  Negative for visual disturbance.   Respiratory:  Negative for apnea, cough, chest tightness and shortness of breath.    Cardiovascular:  Negative for chest pain, palpitations and leg swelling.   Gastrointestinal:  Positive for nausea. Negative for abdominal pain, blood in stool, constipation, diarrhea and vomiting.   Endocrine: Negative for cold intolerance, heat intolerance, polydipsia, polyphagia and polyuria.   Genitourinary:  Negative for decreased urine volume, difficulty urinating, dysuria, frequency and hematuria.   Musculoskeletal:  Negative for arthralgias, back pain, gait problem, joint swelling and myalgias.   Skin:  Negative for color change, rash and  "wound.   Allergic/Immunologic: Negative for environmental allergies and food allergies.   Neurological:  Positive for headaches. Negative for dizziness, syncope, weakness, light-headedness and numbness.   Hematological:  Negative for adenopathy. Does not bruise/bleed easily.   Psychiatric/Behavioral:  Negative for sleep disturbance and suicidal ideas. The patient is nervous/anxious.        Objective   /78   Pulse 83   Ht 5' 3\" (1.6 m)   Wt 74.4 kg (164 lb)   SpO2 98%   BMI 29.05 kg/m²      Physical Exam  Constitutional:       General: She is not in acute distress.     Appearance: She is well-developed. She is not ill-appearing or toxic-appearing.   HENT:      Head: Normocephalic and atraumatic.     Eyes:      General: No scleral icterus.     Conjunctiva/sclera: Conjunctivae normal.       Cardiovascular:      Rate and Rhythm: Normal rate and regular rhythm.      Pulses: Normal pulses.      Heart sounds: Normal heart sounds.   Pulmonary:      Effort: Pulmonary effort is normal.      Breath sounds: Normal breath sounds.   Abdominal:      General: Bowel sounds are normal. There is no distension.      Palpations: Abdomen is soft. There is no mass.      Tenderness: There is no abdominal tenderness. There is no right CVA tenderness or left CVA tenderness.      Hernia: No hernia is present.     Musculoskeletal:         General: Normal range of motion.      Cervical back: Normal range of motion and neck supple.      Right lower leg: No edema.      Left lower leg: No edema.     Skin:     General: Skin is warm and dry.      Findings: No lesion or rash.     Neurological:      General: No focal deficit present.      Mental Status: She is alert and oriented to person, place, and time.      Deep Tendon Reflexes: Reflexes are normal and symmetric.     Psychiatric:         Behavior: Behavior normal.         Thought Content: Thought content normal.         Judgment: Judgment normal.         "

## 2025-06-05 LAB
ATRIAL RATE: 65 BPM
P AXIS: 47 DEGREES
PR INTERVAL: 164 MS
QRS AXIS: 22 DEGREES
QRSD INTERVAL: 78 MS
QT INTERVAL: 384 MS
QTC INTERVAL: 400 MS
T WAVE AXIS: 41 DEGREES
VENTRICULAR RATE: 65 BPM

## 2025-06-05 PROCEDURE — 93010 ELECTROCARDIOGRAM REPORT: CPT | Performed by: STUDENT IN AN ORGANIZED HEALTH CARE EDUCATION/TRAINING PROGRAM

## 2025-06-11 DIAGNOSIS — I10 BENIGN HYPERTENSION: ICD-10-CM

## 2025-06-12 DIAGNOSIS — F41.9 ANXIETY: ICD-10-CM

## 2025-06-12 RX ORDER — AMLODIPINE BESYLATE 5 MG/1
5 TABLET ORAL DAILY
Qty: 90 TABLET | Refills: 1 | Status: SHIPPED | OUTPATIENT
Start: 2025-06-12

## 2025-06-12 NOTE — TELEPHONE ENCOUNTER
Reason for call:   [x] Refill   [] Prior Auth  [] Other:     Office:   [x] PCP/Provider -   [] Specialty/Provider -     Medication: ALPRAZolam (XANAX) 0.5 mg tablet     Dose/Frequency:  Take 1 tablet (0.5 mg total) by mouth 2 (two) times a day     Quantity: 30 tablet    Pharmacy: [facility]/pharmacy #[id number] - [place], [place] -     Local Pharmacy   Does the patient have enough for 3 days?   [] Yes   [x] No - Send as HP to POD    Mail Away Pharmacy   Does the patient have enough for 10 days?   [] Yes   [] No - Send as HP to POD

## 2025-06-13 RX ORDER — ALPRAZOLAM 0.5 MG
0.5 TABLET ORAL 2 TIMES DAILY PRN
Qty: 30 TABLET | Refills: 0 | Status: SHIPPED | OUTPATIENT
Start: 2025-06-13

## 2025-06-15 DIAGNOSIS — K21.9 GASTROESOPHAGEAL REFLUX DISEASE WITHOUT ESOPHAGITIS: ICD-10-CM

## 2025-06-15 RX ORDER — OMEPRAZOLE 20 MG/1
20 CAPSULE, DELAYED RELEASE ORAL DAILY
Qty: 30 CAPSULE | Refills: 5 | Status: SHIPPED | OUTPATIENT
Start: 2025-06-15

## 2025-06-16 ENCOUNTER — APPOINTMENT (OUTPATIENT)
Age: 65
End: 2025-06-16
Attending: NURSE PRACTITIONER
Payer: COMMERCIAL

## 2025-06-16 ENCOUNTER — OFFICE VISIT (OUTPATIENT)
Dept: FAMILY MEDICINE CLINIC | Facility: CLINIC | Age: 65
End: 2025-06-16
Payer: COMMERCIAL

## 2025-06-16 VITALS
HEART RATE: 83 BPM | BODY MASS INDEX: 28.56 KG/M2 | HEIGHT: 63 IN | SYSTOLIC BLOOD PRESSURE: 124 MMHG | DIASTOLIC BLOOD PRESSURE: 80 MMHG | OXYGEN SATURATION: 98 % | RESPIRATION RATE: 18 BRPM | TEMPERATURE: 99 F | WEIGHT: 161.2 LBS

## 2025-06-16 DIAGNOSIS — R51.9 ACUTE INTRACTABLE HEADACHE, UNSPECIFIED HEADACHE TYPE: ICD-10-CM

## 2025-06-16 DIAGNOSIS — I10 BENIGN HYPERTENSION: ICD-10-CM

## 2025-06-16 DIAGNOSIS — A69.20 ERYTHEMA MIGRANS (LYME DISEASE): Primary | ICD-10-CM

## 2025-06-16 DIAGNOSIS — A69.20 ERYTHEMA MIGRANS (LYME DISEASE): ICD-10-CM

## 2025-06-16 PROBLEM — R68.89: Status: ACTIVE | Noted: 2025-06-16

## 2025-06-16 PROCEDURE — 36415 COLL VENOUS BLD VENIPUNCTURE: CPT

## 2025-06-16 PROCEDURE — 86617 LYME DISEASE ANTIBODY: CPT

## 2025-06-16 PROCEDURE — 99214 OFFICE O/P EST MOD 30 MIN: CPT | Performed by: NURSE PRACTITIONER

## 2025-06-16 PROCEDURE — 86618 LYME DISEASE ANTIBODY: CPT

## 2025-06-16 RX ORDER — IBUPROFEN 800 MG/1
800 TABLET, FILM COATED ORAL EVERY 6 HOURS PRN
Qty: 20 TABLET | Refills: 0 | Status: SHIPPED | OUTPATIENT
Start: 2025-06-16

## 2025-06-16 NOTE — ASSESSMENT & PLAN NOTE
Concern for Lyme's disease due to symptoms and appearance of rash, to proceed with Lyme antibody, will call with results.  If positive will treat.  Orders:    Lyme Total AB W Reflex to IGM/IGG; Future

## 2025-06-16 NOTE — ASSESSMENT & PLAN NOTE
To begin ibuprofen 800 mg every 6 hours as needed.    Orders:    ibuprofen (MOTRIN) 800 mg tablet; Take 1 tablet (800 mg total) by mouth every 6 (six) hours as needed for headaches

## 2025-06-16 NOTE — PROGRESS NOTES
Name: Yesi Link      : 1960      MRN: [id number]  Encounter Provider: MELECIO Coronado  Encounter Date: 2025   Encounter department: [facility] [address]  :  Assessment & Plan  Erythema migrans (Lyme disease)  Concern for Lyme's disease due to symptoms and appearance of rash, to proceed with Lyme antibody, will call with results.  If positive will treat.  Orders:    Lyme Total AB W Reflex to IGM/IGG; Future    Acute intractable headache, unspecified headache type  To begin ibuprofen 800 mg every 6 hours as needed.    Orders:    ibuprofen (MOTRIN) 800 mg tablet; Take 1 tablet (800 mg total) by mouth every 6 (six) hours as needed for headaches    Benign hypertension  Blood pressure stable, to continue current antihypertensive regimen.  Counseled on the importance of maintaining a healthy weight and consuming a low-sodium diet.               History of Present Illness   Yesi presents reporting a headache for the past 3 weeks.  She has also been experiencing fatigue, body aches, muscle aches, neck pain and neck stiffness.  She developed a rash on her arms and abdomen 2 weeks ago.  Denies change in vision, vomiting, dizziness, weakness in extremities, fever, or weight loss.  She has been treating her symptoms with Tylenol, ibuprofen, and Xanax to help gain relief of her headache.  This provides mild improvement.  She recently obtained labs.  Also of note, she has a puppy and has been walking him more in the tall grass but denies recent tick bite.    Headache    Review of Systems   Constitutional:  Positive for chills and fatigue.   HENT:  Positive for rhinorrhea and sore throat.    Eyes: Negative.    Respiratory: Negative.     Cardiovascular: Negative.    Gastrointestinal:  Positive for nausea (due to headche).   Endocrine: Negative.    Genitourinary: Negative.    Musculoskeletal:  Positive for arthralgias, myalgias, neck pain and neck stiffness.   Skin:   "Positive for rash.   Allergic/Immunologic: Negative.    Neurological:  Positive for headaches.   Hematological: Negative.    Psychiatric/Behavioral: Negative.         Objective   /80 (BP Location: Left arm, Patient Position: Sitting)   Pulse 83   Temp 99 °F (37.2 °C)   Resp 18   Ht 5' 3\" (1.6 m)   Wt 73.1 kg (161 lb 3.2 oz)   SpO2 98%   BMI 28.56 kg/m²      Physical Exam  Vitals and nursing note reviewed.   Constitutional:       General: She is not in acute distress.     Appearance: Normal appearance. She is well-developed. She is not ill-appearing, toxic-appearing or diaphoretic.   HENT:      Head: Normocephalic and atraumatic.      Right Ear: Tympanic membrane, ear canal and external ear normal.      Left Ear: Tympanic membrane, ear canal and external ear normal.      Nose: Nose normal.      Mouth/Throat:      Mouth: Mucous membranes are moist.      Pharynx: Oropharynx is clear.     Eyes:      Extraocular Movements: Extraocular movements intact.      Conjunctiva/sclera: Conjunctivae normal.      Pupils: Pupils are equal, round, and reactive to light.       Cardiovascular:      Rate and Rhythm: Normal rate and regular rhythm.      Heart sounds: Normal heart sounds. No murmur heard.  Pulmonary:      Effort: Pulmonary effort is normal. No respiratory distress.      Breath sounds: Normal breath sounds. No wheezing or rales.   Chest:      Chest wall: No tenderness.     Musculoskeletal:      Cervical back: Neck supple.   Lymphadenopathy:      Cervical: Cervical adenopathy present.     Skin:     General: Skin is warm and dry.      Capillary Refill: Capillary refill takes less than 2 seconds.      Comments: Presence of erythematous macular rash on trunk and upper extremities     Neurological:      General: No focal deficit present.      Mental Status: She is alert and oriented to person, place, and time.      Cranial Nerves: No cranial nerve deficit.      Sensory: No sensory deficit.      Motor: No weakness. "      Gait: Gait normal.     Psychiatric:         Mood and Affect: Mood normal.         Behavior: Behavior normal.         Thought Content: Thought content normal.         Judgment: Judgment normal.

## 2025-06-17 LAB
B BURGDOR IGG SERPL QL IA: POSITIVE
B BURGDOR IGG+IGM SER QL IA: POSITIVE
B BURGDOR IGM SERPL QL IA: POSITIVE

## 2025-06-18 ENCOUNTER — TELEPHONE (OUTPATIENT)
Age: 65
End: 2025-06-18

## 2025-06-18 ENCOUNTER — RESULTS FOLLOW-UP (OUTPATIENT)
Dept: FAMILY MEDICINE CLINIC | Facility: CLINIC | Age: 65
End: 2025-06-18

## 2025-06-18 DIAGNOSIS — A69.20 ERYTHEMA MIGRANS (LYME DISEASE): Primary | ICD-10-CM

## 2025-06-18 DIAGNOSIS — A69.20 LYME DISEASE: Primary | ICD-10-CM

## 2025-06-18 RX ORDER — DOXYCYCLINE HYCLATE 100 MG
100 TABLET ORAL 2 TIMES DAILY
Qty: 42 TABLET | Refills: 0 | Status: CANCELLED | OUTPATIENT
Start: 2025-06-18 | End: 2025-07-09

## 2025-06-18 RX ORDER — DOXYCYCLINE 100 MG/1
100 CAPSULE ORAL EVERY 12 HOURS SCHEDULED
Qty: 28 CAPSULE | Refills: 0 | Status: SHIPPED | OUTPATIENT
Start: 2025-06-18 | End: 2025-07-02

## 2025-06-18 NOTE — TELEPHONE ENCOUNTER
Any provider please review!  Patient called in concerned about her lab results and wants to know if she should be on abx asap. Patient is requesting if PCP isn't in today, can someone from the team please review and call patient back to further advise next steps.  Patient isn't feeling well she states and is concerned with lyme's.

## 2025-06-19 ENCOUNTER — APPOINTMENT (EMERGENCY)
Dept: RADIOLOGY | Facility: HOSPITAL | Age: 65
End: 2025-06-19
Payer: COMMERCIAL

## 2025-06-19 ENCOUNTER — HOSPITAL ENCOUNTER (EMERGENCY)
Facility: HOSPITAL | Age: 65
Discharge: HOME | End: 2025-06-19
Attending: EMERGENCY MEDICINE
Payer: COMMERCIAL

## 2025-06-19 ENCOUNTER — APPOINTMENT (EMERGENCY)
Dept: CT IMAGING | Facility: HOSPITAL | Age: 65
End: 2025-06-19
Payer: COMMERCIAL

## 2025-06-19 VITALS
TEMPERATURE: 97.8 F | RESPIRATION RATE: 31 BRPM | SYSTOLIC BLOOD PRESSURE: 128 MMHG | HEART RATE: 73 BPM | OXYGEN SATURATION: 98 % | DIASTOLIC BLOOD PRESSURE: 64 MMHG

## 2025-06-19 DIAGNOSIS — R11.2 NAUSEA AND VOMITING: Primary | ICD-10-CM

## 2025-06-19 LAB
2HR DELTA HS TROPONIN: 0 NG/L
ALBUMIN SERPL BCG-MCNC: 3.9 G/DL (ref 3.5–5)
ALP SERPL-CCNC: 72 U/L (ref 34–104)
ALT SERPL W P-5'-P-CCNC: 23 U/L (ref 7–52)
ANION GAP SERPL CALCULATED.3IONS-SCNC: 10 MMOL/L (ref 4–13)
AST SERPL W P-5'-P-CCNC: 25 U/L (ref 13–39)
ATRIAL RATE: 78 BPM
BACTERIA UR QL AUTO: ABNORMAL /HPF
BASOPHILS # BLD AUTO: 0.03 THOUSANDS/ÂΜL (ref 0–0.1)
BASOPHILS NFR BLD AUTO: 0 % (ref 0–1)
BILIRUB SERPL-MCNC: 0.72 MG/DL (ref 0.2–1)
BILIRUB UR QL STRIP: NEGATIVE
BUN SERPL-MCNC: 18 MG/DL (ref 5–25)
CALCIUM SERPL-MCNC: 9.1 MG/DL (ref 8.4–10.2)
CARDIAC TROPONIN I PNL SERPL HS: 8 NG/L (ref ?–50)
CARDIAC TROPONIN I PNL SERPL HS: 8 NG/L (ref ?–50)
CHLORIDE SERPL-SCNC: 103 MMOL/L (ref 96–108)
CLARITY UR: CLEAR
CO2 SERPL-SCNC: 25 MMOL/L (ref 21–32)
COLOR UR: ABNORMAL
CREAT SERPL-MCNC: 1.01 MG/DL (ref 0.6–1.3)
EOSINOPHIL # BLD AUTO: 0 THOUSAND/ÂΜL (ref 0–0.61)
EOSINOPHIL NFR BLD AUTO: 0 % (ref 0–6)
ERYTHROCYTE [DISTWIDTH] IN BLOOD BY AUTOMATED COUNT: 11.8 % (ref 11.6–15.1)
GFR SERPL CREATININE-BSD FRML MDRD: 59 ML/MIN/1.73SQ M
GLUCOSE SERPL-MCNC: 123 MG/DL (ref 65–140)
GLUCOSE UR STRIP-MCNC: NEGATIVE MG/DL
HCT VFR BLD AUTO: 34.8 % (ref 34.8–46.1)
HGB BLD-MCNC: 11.4 G/DL (ref 11.5–15.4)
HGB UR QL STRIP.AUTO: NEGATIVE
IMM GRANULOCYTES # BLD AUTO: 0.03 THOUSAND/UL (ref 0–0.2)
IMM GRANULOCYTES NFR BLD AUTO: 0 % (ref 0–2)
KETONES UR STRIP-MCNC: NEGATIVE MG/DL
LEUKOCYTE ESTERASE UR QL STRIP: ABNORMAL
LIPASE SERPL-CCNC: 21 U/L (ref 11–82)
LYMPHOCYTES # BLD AUTO: 1.27 THOUSANDS/ÂΜL (ref 0.6–4.47)
LYMPHOCYTES NFR BLD AUTO: 15 % (ref 14–44)
MCH RBC QN AUTO: 29 PG (ref 26.8–34.3)
MCHC RBC AUTO-ENTMCNC: 32.8 G/DL (ref 31.4–37.4)
MCV RBC AUTO: 89 FL (ref 82–98)
MONOCYTES # BLD AUTO: 0.22 THOUSAND/ÂΜL (ref 0.17–1.22)
MONOCYTES NFR BLD AUTO: 3 % (ref 4–12)
NEUTROPHILS # BLD AUTO: 7 THOUSANDS/ÂΜL (ref 1.85–7.62)
NEUTS SEG NFR BLD AUTO: 82 % (ref 43–75)
NITRITE UR QL STRIP: NEGATIVE
NON-SQ EPI CELLS URNS QL MICRO: ABNORMAL /HPF
NRBC BLD AUTO-RTO: 0 /100 WBCS
P AXIS: 61 DEGREES
PH UR STRIP.AUTO: 6 [PH]
PLATELET # BLD AUTO: 268 THOUSANDS/UL (ref 149–390)
PMV BLD AUTO: 8.8 FL (ref 8.9–12.7)
POTASSIUM SERPL-SCNC: 3.9 MMOL/L (ref 3.5–5.3)
PR INTERVAL: 184 MS
PROT SERPL-MCNC: 7.3 G/DL (ref 6.4–8.4)
PROT UR STRIP-MCNC: NEGATIVE MG/DL
QRS AXIS: 24 DEGREES
QRSD INTERVAL: 84 MS
QT INTERVAL: 388 MS
QTC INTERVAL: 442 MS
RBC # BLD AUTO: 3.93 MILLION/UL (ref 3.81–5.12)
RBC #/AREA URNS AUTO: ABNORMAL /HPF
SODIUM SERPL-SCNC: 138 MMOL/L (ref 135–147)
SP GR UR STRIP.AUTO: <=1.005 (ref 1–1.03)
T WAVE AXIS: 50 DEGREES
UROBILINOGEN UR QL STRIP.AUTO: 0.2 E.U./DL
VENTRICULAR RATE: 78 BPM
WBC # BLD AUTO: 8.55 THOUSAND/UL (ref 4.31–10.16)
WBC #/AREA URNS AUTO: ABNORMAL /HPF

## 2025-06-19 PROCEDURE — 93010 ELECTROCARDIOGRAM REPORT: CPT | Performed by: INTERNAL MEDICINE

## 2025-06-19 PROCEDURE — 80053 COMPREHEN METABOLIC PANEL: CPT | Performed by: EMERGENCY MEDICINE

## 2025-06-19 PROCEDURE — 83690 ASSAY OF LIPASE: CPT | Performed by: EMERGENCY MEDICINE

## 2025-06-19 PROCEDURE — 81001 URINALYSIS AUTO W/SCOPE: CPT | Performed by: EMERGENCY MEDICINE

## 2025-06-19 PROCEDURE — 93005 ELECTROCARDIOGRAM TRACING: CPT

## 2025-06-19 PROCEDURE — 74177 CT ABD & PELVIS W/CONTRAST: CPT

## 2025-06-19 PROCEDURE — 71046 X-RAY EXAM CHEST 2 VIEWS: CPT

## 2025-06-19 PROCEDURE — 36415 COLL VENOUS BLD VENIPUNCTURE: CPT | Performed by: EMERGENCY MEDICINE

## 2025-06-19 PROCEDURE — 96375 TX/PRO/DX INJ NEW DRUG ADDON: CPT

## 2025-06-19 PROCEDURE — 85025 COMPLETE CBC W/AUTO DIFF WBC: CPT | Performed by: EMERGENCY MEDICINE

## 2025-06-19 PROCEDURE — 84484 ASSAY OF TROPONIN QUANT: CPT | Performed by: EMERGENCY MEDICINE

## 2025-06-19 PROCEDURE — 96365 THER/PROPH/DIAG IV INF INIT: CPT

## 2025-06-19 PROCEDURE — 99284 EMERGENCY DEPT VISIT MOD MDM: CPT | Performed by: EMERGENCY MEDICINE

## 2025-06-19 PROCEDURE — 99284 EMERGENCY DEPT VISIT MOD MDM: CPT

## 2025-06-19 RX ORDER — SUCRALFATE 1 G/1
1 TABLET ORAL 4 TIMES DAILY PRN
Qty: 56 TABLET | Refills: 0 | Status: SHIPPED | OUTPATIENT
Start: 2025-06-19 | End: 2025-07-03

## 2025-06-19 RX ORDER — PANTOPRAZOLE SODIUM 20 MG/1
40 TABLET, DELAYED RELEASE ORAL DAILY
Qty: 28 TABLET | Refills: 0 | Status: SHIPPED | OUTPATIENT
Start: 2025-06-19 | End: 2025-07-03

## 2025-06-19 RX ORDER — ONDANSETRON 2 MG/ML
4 INJECTION INTRAMUSCULAR; INTRAVENOUS ONCE
Status: COMPLETED | OUTPATIENT
Start: 2025-06-19 | End: 2025-06-19

## 2025-06-19 RX ORDER — PANTOPRAZOLE SODIUM 40 MG/10ML
40 INJECTION, POWDER, LYOPHILIZED, FOR SOLUTION INTRAVENOUS ONCE
Status: COMPLETED | OUTPATIENT
Start: 2025-06-19 | End: 2025-06-19

## 2025-06-19 RX ORDER — ONDANSETRON 4 MG/1
4 TABLET, ORALLY DISINTEGRATING ORAL EVERY 6 HOURS PRN
Qty: 20 TABLET | Refills: 0 | Status: SHIPPED | OUTPATIENT
Start: 2025-06-19

## 2025-06-19 RX ADMIN — IOHEXOL 100 ML: 350 INJECTION, SOLUTION INTRAVENOUS at 08:21

## 2025-06-19 RX ADMIN — SODIUM CHLORIDE 1000 ML: 0.9 INJECTION, SOLUTION INTRAVENOUS at 08:49

## 2025-06-19 RX ADMIN — DOXYCYCLINE 100 MG: 100 INJECTION, POWDER, LYOPHILIZED, FOR SOLUTION INTRAVENOUS at 08:51

## 2025-06-19 RX ADMIN — PANTOPRAZOLE SODIUM 40 MG: 40 INJECTION, POWDER, FOR SOLUTION INTRAVENOUS at 08:49

## 2025-06-19 RX ADMIN — ONDANSETRON 4 MG: 2 INJECTION INTRAMUSCULAR; INTRAVENOUS at 08:49

## 2025-06-19 NOTE — ED PROVIDER NOTES
Time reflects when diagnosis was documented in both MDM as applicable and the Disposition within this note       Time User Action Codes Description Comment    6/19/2025 10:56 AM Tash Cabrera [R11.2] Nausea and vomiting           ED Disposition       ED Disposition   Discharge    Condition   Stable    Date/Time   Thu Jun 19, 2025  9:41 AM    Comment   Yesi Link discharge to home/self care.                   Assessment & Plan       Medical Decision Making  64 y/o female with n/v- will get labs, ct scan, UA, and saline, zofran, and reassess.     Patient re-evaluated and feels improved. Will prescribe zofran and protonix. Instructed to stop omeprazole while taking protonix. Instructed to take doxy on a full stomach.     Amount and/or Complexity of Data Reviewed  Labs: ordered.  Radiology: ordered.    Risk  Prescription drug management.             Medications   sodium chloride 0.9 % bolus 1,000 mL (0 mL Intravenous Stopped 6/19/25 1008)   ondansetron (ZOFRAN) injection 4 mg (4 mg Intravenous Given 6/19/25 0849)   doxycycline (VIBRAMYCIN) 100 mg in sodium chloride 0.9 % 100 mL IVPB (0 mg Intravenous Stopped 6/19/25 0951)   pantoprazole (PROTONIX) injection 40 mg (40 mg Intravenous Given 6/19/25 0849)   iohexol (OMNIPAQUE) 350 MG/ML injection (MULTI-DOSE) 100 mL (100 mL Intravenous Given 6/19/25 0821)       ED Risk Strat Scores                    No data recorded                            History of Present Illness       Chief Complaint   Patient presents with    Vomiting     Patient c.o vomiting x 3 days, unable to keep down daily meds. Dx with lymes disease last week.          Past Medical History[1]   Past Surgical History[2]   Family History[3]   Social History[4]   E-Cigarette/Vaping    E-Cigarette Use Never User       E-Cigarette/Vaping Substances    Nicotine No     THC No     CBD No     Flavoring No     Other No     Unknown No       I have reviewed and agree with the history as documented.     65  y/o female presents to the ED for n/v x 2 days. States that she had headache, body aches, rash, and cough x 3 weeks. She was diagnosed with lyme 3 days ago and placed on doxycycline. She denies any fever, cp, sob, or abd pain. States that since taking the medication she has had nausea. States that she has vomited 2x after taking the medication. States that she hasn't been eating much prior to taking the medication as she only ate saltines. She denies any diarrhea. No other complaints.       History provided by:  Patient  Vomiting  Severity:  Moderate  Timing:  Constant  Quality:  Stomach contents  Progression:  Worsening  Chronicity:  New  Recent urination:  Normal  Relieved by:  None tried  Worsened by:  Nothing  Ineffective treatments:  None tried  Associated symptoms: no abdominal pain, no chills, no cough, no diarrhea, no fever, no headaches and no sore throat        Review of Systems   Constitutional:  Negative for chills and fever.   HENT:  Negative for congestion, ear pain and sore throat.    Eyes:  Negative for pain and visual disturbance.   Respiratory:  Negative for cough, shortness of breath and wheezing.    Cardiovascular:  Negative for chest pain and leg swelling.   Gastrointestinal:  Positive for nausea and vomiting. Negative for abdominal pain and diarrhea.   Genitourinary:  Negative for dysuria, frequency, hematuria and urgency.   Musculoskeletal:  Negative for neck pain and neck stiffness.   Skin:  Negative for rash and wound.   Neurological:  Negative for weakness, numbness and headaches.   Psychiatric/Behavioral:  Negative for agitation and confusion.    All other systems reviewed and are negative.          Objective       ED Triage Vitals   Temperature Pulse Blood Pressure Respirations SpO2 Patient Position - Orthostatic VS   06/19/25 0656 06/19/25 0654 06/19/25 0654 06/19/25 0654 06/19/25 0654 06/19/25 0900   97.8 °F (36.6 °C) 85 124/63 18 97 % Lying      Temp src Heart Rate Source BP Location  FiO2 (%) Pain Score    -- -- 06/19/25 0900 -- --      Right arm        Vitals      Date and Time Temp Pulse SpO2 Resp BP Pain Score FACES Pain Rating User   06/19/25 1045 -- 73 98 % 31 128/64 -- -- SM   06/19/25 0900 -- 80 98 % 20 140/65 -- -- SM   06/19/25 0656 97.8 °F (36.6 °C) -- -- -- -- -- -- JV   06/19/25 0654 -- 85 97 % 18 124/63 -- -- JV            Physical Exam  Vitals and nursing note reviewed.   Constitutional:       Appearance: She is well-developed.   HENT:      Head: Normocephalic and atraumatic.     Eyes:      Pupils: Pupils are equal, round, and reactive to light.       Cardiovascular:      Rate and Rhythm: Normal rate and regular rhythm.   Pulmonary:      Effort: Pulmonary effort is normal.      Breath sounds: Normal breath sounds.   Abdominal:      General: Bowel sounds are normal.      Palpations: Abdomen is soft.      Tenderness: There is no abdominal tenderness.     Musculoskeletal:         General: Normal range of motion.      Cervical back: Normal range of motion and neck supple.     Skin:     General: Skin is warm and dry.     Neurological:      General: No focal deficit present.      Mental Status: She is alert and oriented to person, place, and time.      Comments: No focal deficits       Results Reviewed       Procedure Component Value Units Date/Time    HS Troponin I 2hr [[id number]]  (Normal) Collected: 06/19/25 1012    Lab Status: Final result Specimen: Blood from Arm, Left Updated: 06/19/25 1044     hs TnI 2hr 8 ng/L      Delta 2hr hsTnI 0 ng/L     HS Troponin I 4hr [[id number]]     Lab Status: No result Specimen: Blood     Urine Microscopic [[id number]]  (Abnormal) Collected: 06/19/25 0845    Lab Status: Final result Specimen: Urine, Clean Catch Updated: 06/19/25 1012     RBC, UA 1-2 /hpf      WBC, UA 2-4 /hpf      Epithelial Cells Occasional /hpf      Bacteria, UA None Seen /hpf     UA w Reflex to Microscopic w Reflex to Culture [[id number]]  (Abnormal) Collected: 06/19/25 0845    Lab  Status: Final result Specimen: Urine, Clean Catch Updated: 06/19/25 0907     Color, UA Light Yellow     Clarity, UA Clear     Specific Gravity, UA <=1.005     pH, UA 6.0     Leukocytes, UA Small     Nitrite, UA Negative     Protein, UA Negative mg/dl      Glucose, UA Negative mg/dl      Ketones, UA Negative mg/dl      Urobilinogen, UA 0.2 E.U./dl      Bilirubin, UA Negative     Occult Blood, UA Negative    HS Troponin 0hr (reflex protocol) [[id number]]  (Normal) Collected: 06/19/25 0818    Lab Status: Final result Specimen: Blood from Arm, Right Updated: 06/19/25 0846     hs TnI 0hr 8 ng/L     Comprehensive metabolic panel [[id number]] Collected: 06/19/25 0748    Lab Status: Final result Specimen: Blood from Arm, Left Updated: 06/19/25 0809     Sodium 138 mmol/L      Potassium 3.9 mmol/L      Chloride 103 mmol/L      CO2 25 mmol/L      ANION GAP 10 mmol/L      BUN 18 mg/dL      Creatinine 1.01 mg/dL      Glucose 123 mg/dL      Calcium 9.1 mg/dL      AST 25 U/L      ALT 23 U/L      Alkaline Phosphatase 72 U/L      Total Protein 7.3 g/dL      Albumin 3.9 g/dL      Total Bilirubin 0.72 mg/dL      eGFR 59 ml/min/1.73sq m     Narrative:      National Kidney Disease Foundation guidelines for Chronic Kidney Disease (CKD):     Stage 1 with normal or high GFR (GFR > 90 mL/min/1.73 square meters)    Stage 2 Mild CKD (GFR = 60-89 mL/min/1.73 square meters)    Stage 3A Moderate CKD (GFR = 45-59 mL/min/1.73 square meters)    Stage 3B Moderate CKD (GFR = 30-44 mL/min/1.73 square meters)    Stage 4 Severe CKD (GFR = 15-29 mL/min/1.73 square meters)    Stage 5 End Stage CKD (GFR <15 mL/min/1.73 square meters)  Note: GFR calculation is accurate only with a steady state creatinine    Lipase [[id number]]  (Normal) Collected: 06/19/25 0748    Lab Status: Final result Specimen: Blood from Arm, Left Updated: 06/19/25 0809     Lipase 21 u/L     CBC and differential [[id number]]  (Abnormal) Collected: 06/19/25 0748    Lab Status: Final result  Specimen: Blood from Arm, Left Updated: 06/19/25 0754     WBC 8.55 Thousand/uL      RBC 3.93 Million/uL      Hemoglobin 11.4 g/dL      Hematocrit 34.8 %      MCV 89 fL      MCH 29.0 pg      MCHC 32.8 g/dL      RDW 11.8 %      MPV 8.8 fL      Platelets 268 Thousands/uL      nRBC 0 /100 WBCs      Segmented % 82 %      Immature Grans % 0 %      Lymphocytes % 15 %      Monocytes % 3 %      Eosinophils Relative 0 %      Basophils Relative 0 %      Absolute Neutrophils 7.00 Thousands/µL      Absolute Immature Grans 0.03 Thousand/uL      Absolute Lymphocytes 1.27 Thousands/µL      Absolute Monocytes 0.22 Thousand/µL      Eosinophils Absolute 0.00 Thousand/µL      Basophils Absolute 0.03 Thousands/µL             CT abdomen pelvis with contrast   Final Interpretation by Gonzales Genao MD (06/19 0846)      No acute findings in the abdomen or pelvis.         Workstation performed: [id number]         XR chest 2 views    (Results Pending)       Procedures    ED Medication and Procedure Management   Prior to Admission Medications   Prescriptions Last Dose Informant Patient Reported? Taking?   ALPRAZolam (XANAX) 0.5 mg tablet   No No   Sig: Take 1 tablet (0.5 mg total) by mouth 2 (two) times a day as needed for anxiety   amLODIPine (NORVASC) 5 mg tablet   No No   Sig: TAKE 1 TABLET (5 MG TOTAL) BY MOUTH DAILY.   busPIRone (BUSPAR) 15 mg tablet   No No   Sig: TAKE 1 TABLET BY MOUTH 3 TIMES A DAY.   cholecalciferol (VITAMIN D3) 1,000 units tablet   No No   Sig: Take 2 tablets (2,000 Units total) by mouth daily   doxycycline hyclate (VIBRAMYCIN) 100 mg capsule   No No   Sig: Take 1 capsule (100 mg total) by mouth every 12 (twelve) hours for 14 days   ibuprofen (MOTRIN) 800 mg tablet   No No   Sig: Take 1 tablet (800 mg total) by mouth every 6 (six) hours as needed for headaches   omeprazole (PriLOSEC) 20 mg delayed release capsule   No No   Sig: TAKE 1 CAPSULE BY MOUTH EVERY DAY      Facility-Administered Medications: None      Patient's Medications   Discharge Prescriptions    ONDANSETRON (ZOFRAN-ODT) 4 MG DISINTEGRATING TABLET    Take 1 tablet (4 mg total) by mouth every 6 (six) hours as needed for nausea or vomiting       Start Date: 6/19/2025 End Date: --       Order Dose: 4 mg       Quantity: 20 tablet    Refills: 0    PANTOPRAZOLE (PROTONIX) 20 MG TABLET    Take 2 tablets (40 mg total) by mouth daily for 14 days       Start Date: 6/19/2025 End Date: 7/3/2025       Order Dose: 40 mg       Quantity: 28 tablet    Refills: 0    SUCRALFATE (CARAFATE) 1 G TABLET    Take 1 tablet (1 g total) by mouth 4 (four) times a day as needed (n/v, abd pain) for up to 14 days       Start Date: 6/19/2025 End Date: 7/3/2025       Order Dose: 1 g       Quantity: 56 tablet    Refills: 0     No discharge procedures on file.  ED SEPSIS DOCUMENTATION   Time reflects when diagnosis was documented in both MDM as applicable and the Disposition within this note       Time User Action Codes Description Comment    6/19/2025 10:56 AM Tash Cabrera Add [R11.2] Nausea and vomiting                      [1]   Past Medical History:  Diagnosis Date    Depression with anxiety 11/8/2018   [2] No past surgical history on file.  [3]   Family History  Problem Relation Name Age of Onset    Heart attack Mother          MI    Heart attack Father          MI    Migraines Sister      No Known Problems Maternal Grandfather      No Known Problems Paternal Grandfather      Heart attack Brother          MI    No Known Problems Maternal Aunt      No Known Problems Maternal Aunt      No Known Problems Maternal Aunt      No Known Problems Maternal Aunt      Breast cancer Neg Hx     [4]   Social History  Tobacco Use    Smoking status: Former    Smokeless tobacco: Never   Vaping Use    Vaping status: Never Used   Substance Use Topics    Alcohol use: Yes     Comment: social    Drug use: No        Tash Cabrera DO  06/19/25 1315

## 2025-06-20 ENCOUNTER — VBI (OUTPATIENT)
Dept: FAMILY MEDICINE CLINIC | Facility: CLINIC | Age: 65
End: 2025-06-20

## 2025-06-20 ENCOUNTER — TELEPHONE (OUTPATIENT)
Age: 65
End: 2025-06-20

## 2025-06-20 ENCOUNTER — PATIENT MESSAGE (OUTPATIENT)
Dept: FAMILY MEDICINE CLINIC | Facility: CLINIC | Age: 65
End: 2025-06-20

## 2025-06-20 NOTE — TELEPHONE ENCOUNTER
06/20/25 12:30 PM    Patient contacted post ED visit, outreach attempt made but message could not be left. Additional outreach attempt will be made.     Thank you.  Jose A Pool MA  [facility]

## 2025-06-20 NOTE — TELEPHONE ENCOUNTER
Pt called and said she was in the ER yesterday and was placed on medication for lyme disease.  She's having some gas pains and bloating and is asking if Leia can look at her new medications and let her know if she can take GasX and also her Xanax if needed.  She would like a call back today please.

## 2025-06-23 ENCOUNTER — TELEPHONE (OUTPATIENT)
Age: 65
End: 2025-06-23

## 2025-06-23 LAB
ATRIAL RATE: 75 BPM
P AXIS: 67 DEGREES
PR INTERVAL: 180 MS
QRS AXIS: 13 DEGREES
QRSD INTERVAL: 84 MS
QT INTERVAL: 394 MS
QTC INTERVAL: 439 MS
T WAVE AXIS: 46 DEGREES
VENTRICULAR RATE: 75 BPM

## 2025-06-23 PROCEDURE — 93010 ELECTROCARDIOGRAM REPORT: CPT | Performed by: INTERNAL MEDICINE

## 2025-06-23 NOTE — TELEPHONE ENCOUNTER
Notified her of recommendation.     Patient would like to know why she was prescribed doxycycline hyclate instead of monohydrate. Her research indicate this may be gentler on her stomach.     She would also like to know what do from July 2nd after she finishes her antibiotics before seeing PCP on July 7th.     Please advise.     Please call and not use [email or web address].

## 2025-06-24 NOTE — TELEPHONE ENCOUNTER
06/24/25 10:52 AM    Patient contacted post ED visit, [facility] department spoke with patient/caregiver and outreach questions were declined.    Thank you.  Jose A Pool MA  [facility]

## 2025-07-03 NOTE — PATIENT COMMUNICATION
Patient called in concerned that the ER doctors didn't review her EKG, and Labs results with her. Patient states their abnormalities on the results and she would like if PCP could call her back today to discuss.

## 2025-07-07 ENCOUNTER — OFFICE VISIT (OUTPATIENT)
Dept: FAMILY MEDICINE CLINIC | Facility: CLINIC | Age: 65
End: 2025-07-07
Payer: COMMERCIAL

## 2025-07-07 VITALS
OXYGEN SATURATION: 98 % | SYSTOLIC BLOOD PRESSURE: 118 MMHG | WEIGHT: 159 LBS | HEART RATE: 72 BPM | DIASTOLIC BLOOD PRESSURE: 72 MMHG | BODY MASS INDEX: 28.17 KG/M2 | HEIGHT: 63 IN

## 2025-07-07 DIAGNOSIS — A69.20 LYME DISEASE: ICD-10-CM

## 2025-07-07 DIAGNOSIS — K21.9 GASTROESOPHAGEAL REFLUX DISEASE WITHOUT ESOPHAGITIS: ICD-10-CM

## 2025-07-07 DIAGNOSIS — I10 BENIGN HYPERTENSION: ICD-10-CM

## 2025-07-07 DIAGNOSIS — F33.1 MODERATE EPISODE OF RECURRENT MAJOR DEPRESSIVE DISORDER (HCC): ICD-10-CM

## 2025-07-07 DIAGNOSIS — Z12.31 ENCOUNTER FOR SCREENING MAMMOGRAM FOR BREAST CANCER: Primary | ICD-10-CM

## 2025-07-07 DIAGNOSIS — F41.9 ANXIETY: ICD-10-CM

## 2025-07-07 PROBLEM — R51.9 ACUTE INTRACTABLE HEADACHE: Status: RESOLVED | Noted: 2025-06-16 | Resolved: 2025-07-07

## 2025-07-07 PROBLEM — M25.50 DIFFUSE ARTHRALGIA: Status: RESOLVED | Noted: 2020-12-04 | Resolved: 2025-07-07

## 2025-07-07 PROBLEM — R68.89: Status: RESOLVED | Noted: 2025-06-16 | Resolved: 2025-07-07

## 2025-07-07 PROCEDURE — 99214 OFFICE O/P EST MOD 30 MIN: CPT | Performed by: NURSE PRACTITIONER

## 2025-07-07 RX ORDER — ALPRAZOLAM 0.5 MG
0.5 TABLET ORAL 2 TIMES DAILY PRN
Qty: 30 TABLET | Refills: 0 | Status: SHIPPED | OUTPATIENT
Start: 2025-07-07

## 2025-07-07 NOTE — ASSESSMENT & PLAN NOTE
Continue on Xanax sparingly as needed.  Continues on BuSpar 3 times daily.  Patient has declined other controller medications in the past.  Orders:    ALPRAZolam (XANAX) 0.5 mg tablet; Take 1 tablet (0.5 mg total) by mouth 2 (two) times a day as needed for anxiety

## 2025-07-07 NOTE — PROGRESS NOTES
"Name: Yesi Link      : 1960      MRN: [id number]  Encounter Provider: MELECIO Durant  Encounter Date: 2025   Encounter department: [facility] [address]  :  Assessment & Plan  Encounter for screening mammogram for breast cancer    Orders:    Mammo screening bilateral w 3d and cad; Future    Anxiety  Continue on Xanax sparingly as needed.  Continues on BuSpar 3 times daily.  Patient has declined other controller medications in the past.  Orders:    ALPRAZolam (XANAX) 0.5 mg tablet; Take 1 tablet (0.5 mg total) by mouth 2 (two) times a day as needed for anxiety    Lyme disease  Recently finished 2-week course of doxycycline.  Patient is feeling better.  She is to let us know if any symptoms do return.       Benign hypertension  Blood pressure is well-managed with amlodipine.       Gastroesophageal reflux disease without esophagitis  Doing okay with omeprazole daily.       Moderate episode of recurrent major depressive disorder (HCC)                  History of Present Illness   Patient presents for routine follow-up.  Recently detected and treated for Lyme disease.  Patient is feeling much better.      Review of Systems   Constitutional:  Negative for chills and fever.   HENT:  Negative for ear pain and sore throat.    Eyes:  Negative for pain and visual disturbance.   Respiratory:  Negative for cough and shortness of breath.    Cardiovascular:  Negative for chest pain and palpitations.   Gastrointestinal:  Negative for abdominal pain and vomiting.   Genitourinary:  Negative for dysuria and hematuria.   Musculoskeletal:  Negative for arthralgias and back pain.   Skin:  Negative for color change and rash.   Neurological:  Negative for seizures and syncope.   Psychiatric/Behavioral:  Negative for dysphoric mood. The patient is nervous/anxious.    All other systems reviewed and are negative.      Objective   /72   Pulse 72   Ht 5' 3\" (1.6 m)   Wt 72.1 " kg (159 lb)   SpO2 98%   BMI 28.17 kg/m²      Physical Exam  Vitals and nursing note reviewed.   Constitutional:       General: She is not in acute distress.     Appearance: She is well-developed.   HENT:      Head: Normocephalic and atraumatic.     Eyes:      Conjunctiva/sclera: Conjunctivae normal.       Cardiovascular:      Rate and Rhythm: Normal rate and regular rhythm.      Heart sounds: No murmur heard.  Pulmonary:      Effort: Pulmonary effort is normal. No respiratory distress.      Breath sounds: Normal breath sounds.   Abdominal:      Palpations: Abdomen is soft.      Tenderness: There is no abdominal tenderness.     Musculoskeletal:         General: No swelling.      Cervical back: Neck supple.     Skin:     General: Skin is warm and dry.      Capillary Refill: Capillary refill takes less than 2 seconds.     Neurological:      Mental Status: She is alert and oriented to person, place, and time.     Psychiatric:         Mood and Affect: Mood normal.

## 2025-07-12 DIAGNOSIS — F41.9 ANXIETY: ICD-10-CM

## 2025-07-12 RX ORDER — BUSPIRONE HYDROCHLORIDE 15 MG/1
15 TABLET ORAL 3 TIMES DAILY
Qty: 90 TABLET | Refills: 1 | Status: SHIPPED | OUTPATIENT
Start: 2025-07-12

## 2025-08-18 DIAGNOSIS — F41.9 ANXIETY: ICD-10-CM

## 2025-08-18 RX ORDER — ALPRAZOLAM 0.5 MG
0.5 TABLET ORAL 2 TIMES DAILY PRN
Qty: 30 TABLET | Refills: 0 | Status: SHIPPED | OUTPATIENT
Start: 2025-08-18